# Patient Record
Sex: FEMALE | Race: WHITE | Employment: OTHER | URBAN - METROPOLITAN AREA
[De-identification: names, ages, dates, MRNs, and addresses within clinical notes are randomized per-mention and may not be internally consistent; named-entity substitution may affect disease eponyms.]

---

## 2018-03-27 RX ORDER — SULFASALAZINE 500 MG/1
500 TABLET ORAL 2 TIMES DAILY
COMMUNITY
End: 2019-05-08 | Stop reason: ALTCHOICE

## 2018-03-27 RX ORDER — OMEPRAZOLE 20 MG/1
20 CAPSULE, DELAYED RELEASE ORAL DAILY
COMMUNITY

## 2018-03-27 RX ORDER — SIMVASTATIN 40 MG
40 TABLET ORAL NIGHTLY
COMMUNITY

## 2018-03-28 ENCOUNTER — ANESTHESIA (OUTPATIENT)
Dept: OPERATING ROOM | Age: 58
End: 2018-03-28
Payer: COMMERCIAL

## 2018-03-28 ENCOUNTER — HOSPITAL ENCOUNTER (OUTPATIENT)
Dept: OPERATING ROOM | Age: 58
Discharge: HOME | End: 2018-03-28
Payer: COMMERCIAL

## 2018-03-28 ENCOUNTER — ANESTHESIA EVENT (OUTPATIENT)
Dept: OPERATING ROOM | Age: 58
End: 2018-03-28
Payer: COMMERCIAL

## 2018-03-28 ENCOUNTER — HOSPITAL ENCOUNTER (OUTPATIENT)
Age: 58
Setting detail: OUTPATIENT SURGERY
Discharge: HOME | End: 2018-03-28
Attending: ANESTHESIOLOGY | Admitting: ANESTHESIOLOGY
Payer: COMMERCIAL

## 2018-03-28 VITALS
SYSTOLIC BLOOD PRESSURE: 127 MMHG | OXYGEN SATURATION: 93 % | RESPIRATION RATE: 16 BRPM | DIASTOLIC BLOOD PRESSURE: 78 MMHG

## 2018-03-28 VITALS
HEART RATE: 76 BPM | TEMPERATURE: 98 F | DIASTOLIC BLOOD PRESSURE: 71 MMHG | BODY MASS INDEX: 45 KG/M2 | HEIGHT: 66 IN | OXYGEN SATURATION: 95 % | SYSTOLIC BLOOD PRESSURE: 114 MMHG | RESPIRATION RATE: 16 BRPM | WEIGHT: 280 LBS

## 2018-03-28 DIAGNOSIS — M51.36 DDD (DEGENERATIVE DISC DISEASE), LUMBAR: ICD-10-CM

## 2018-03-28 PROCEDURE — 2580000003 HC RX 258: Performed by: ANESTHESIOLOGY

## 2018-03-28 PROCEDURE — 6360000004 HC RX CONTRAST MEDICATION: Performed by: ANESTHESIOLOGY

## 2018-03-28 PROCEDURE — 3600000005 HC SURGERY LEVEL 5 BASE: Performed by: ANESTHESIOLOGY

## 2018-03-28 PROCEDURE — 2580000003 HC RX 258

## 2018-03-28 PROCEDURE — 2500000003 HC RX 250 WO HCPCS: Performed by: ANESTHESIOLOGY

## 2018-03-28 PROCEDURE — 6360000002 HC RX W HCPCS: Performed by: ANESTHESIOLOGY

## 2018-03-28 PROCEDURE — 82962 GLUCOSE BLOOD TEST: CPT | Performed by: ANESTHESIOLOGY

## 2018-03-28 PROCEDURE — 2500000003 HC RX 250 WO HCPCS: Performed by: NURSE ANESTHETIST, CERTIFIED REGISTERED

## 2018-03-28 PROCEDURE — 6360000002 HC RX W HCPCS: Performed by: NURSE ANESTHETIST, CERTIFIED REGISTERED

## 2018-03-28 PROCEDURE — 7100000010 HC PHASE II RECOVERY - FIRST 15 MIN: Performed by: ANESTHESIOLOGY

## 2018-03-28 PROCEDURE — 3700000000 HC ANESTHESIA ATTENDED CARE: Performed by: ANESTHESIOLOGY

## 2018-03-28 PROCEDURE — 7100000011 HC PHASE II RECOVERY - ADDTL 15 MIN: Performed by: ANESTHESIOLOGY

## 2018-03-28 PROCEDURE — 77003 FLUOROGUIDE FOR SPINE INJECT: CPT

## 2018-03-28 RX ORDER — DEXAMETHASONE SODIUM PHOSPHATE 10 MG/ML
INJECTION, SOLUTION INTRAMUSCULAR; INTRAVENOUS PRN
Status: DISCONTINUED | OUTPATIENT
Start: 2018-03-28 | End: 2018-03-28 | Stop reason: HOSPADM

## 2018-03-28 RX ORDER — OXYCODONE HYDROCHLORIDE AND ACETAMINOPHEN 5; 325 MG/1; MG/1
1 TABLET ORAL EVERY 4 HOURS PRN
Status: DISCONTINUED | OUTPATIENT
Start: 2018-03-28 | End: 2018-03-28 | Stop reason: HOSPADM

## 2018-03-28 RX ORDER — DIPHENHYDRAMINE HYDROCHLORIDE 50 MG/ML
12.5 INJECTION INTRAMUSCULAR; INTRAVENOUS
Status: DISCONTINUED | OUTPATIENT
Start: 2018-03-28 | End: 2018-03-28 | Stop reason: HOSPADM

## 2018-03-28 RX ORDER — 0.9 % SODIUM CHLORIDE 0.9 %
VIAL (ML) INJECTION PRN
Status: DISCONTINUED | OUTPATIENT
Start: 2018-03-28 | End: 2018-03-28 | Stop reason: HOSPADM

## 2018-03-28 RX ORDER — SODIUM CHLORIDE, SODIUM LACTATE, POTASSIUM CHLORIDE, CALCIUM CHLORIDE 600; 310; 30; 20 MG/100ML; MG/100ML; MG/100ML; MG/100ML
INJECTION, SOLUTION INTRAVENOUS CONTINUOUS
Status: DISCONTINUED | OUTPATIENT
Start: 2018-03-28 | End: 2018-03-28 | Stop reason: HOSPADM

## 2018-03-28 RX ORDER — HYDROMORPHONE HCL 110MG/55ML
0.25 PATIENT CONTROLLED ANALGESIA SYRINGE INTRAVENOUS EVERY 5 MIN PRN
Status: DISCONTINUED | OUTPATIENT
Start: 2018-03-28 | End: 2018-03-28 | Stop reason: HOSPADM

## 2018-03-28 RX ORDER — PROMETHAZINE HYDROCHLORIDE 25 MG/ML
25 INJECTION, SOLUTION INTRAMUSCULAR; INTRAVENOUS
Status: DISCONTINUED | OUTPATIENT
Start: 2018-03-28 | End: 2018-03-28 | Stop reason: HOSPADM

## 2018-03-28 RX ORDER — PROPOFOL 10 MG/ML
INJECTION, EMULSION INTRAVENOUS PRN
Status: DISCONTINUED | OUTPATIENT
Start: 2018-03-28 | End: 2018-03-28 | Stop reason: SDUPTHER

## 2018-03-28 RX ORDER — MEPERIDINE HYDROCHLORIDE 25 MG/ML
12.5 INJECTION INTRAMUSCULAR; INTRAVENOUS; SUBCUTANEOUS EVERY 5 MIN PRN
Status: DISCONTINUED | OUTPATIENT
Start: 2018-03-28 | End: 2018-03-28 | Stop reason: HOSPADM

## 2018-03-28 RX ORDER — KETOROLAC TROMETHAMINE 30 MG/ML
INJECTION, SOLUTION INTRAMUSCULAR; INTRAVENOUS PRN
Status: DISCONTINUED | OUTPATIENT
Start: 2018-03-28 | End: 2018-03-28 | Stop reason: SDUPTHER

## 2018-03-28 RX ORDER — LIDOCAINE HYDROCHLORIDE 20 MG/ML
INJECTION, SOLUTION INFILTRATION; PERINEURAL PRN
Status: DISCONTINUED | OUTPATIENT
Start: 2018-03-28 | End: 2018-03-28 | Stop reason: SDUPTHER

## 2018-03-28 RX ORDER — FENTANYL CITRATE 50 UG/ML
50 INJECTION, SOLUTION INTRAMUSCULAR; INTRAVENOUS EVERY 5 MIN PRN
Status: DISCONTINUED | OUTPATIENT
Start: 2018-03-28 | End: 2018-03-28 | Stop reason: HOSPADM

## 2018-03-28 RX ORDER — MORPHINE SULFATE 2 MG/ML
1 INJECTION, SOLUTION INTRAMUSCULAR; INTRAVENOUS EVERY 5 MIN PRN
Status: DISCONTINUED | OUTPATIENT
Start: 2018-03-28 | End: 2018-03-28 | Stop reason: HOSPADM

## 2018-03-28 RX ORDER — HYDRALAZINE HYDROCHLORIDE 20 MG/ML
5 INJECTION INTRAMUSCULAR; INTRAVENOUS EVERY 10 MIN PRN
Status: DISCONTINUED | OUTPATIENT
Start: 2018-03-28 | End: 2018-03-28 | Stop reason: HOSPADM

## 2018-03-28 RX ORDER — LIDOCAINE HYDROCHLORIDE 10 MG/ML
INJECTION, SOLUTION EPIDURAL; INFILTRATION; INTRACAUDAL; PERINEURAL PRN
Status: DISCONTINUED | OUTPATIENT
Start: 2018-03-28 | End: 2018-03-28 | Stop reason: HOSPADM

## 2018-03-28 RX ORDER — LABETALOL HYDROCHLORIDE 5 MG/ML
5 INJECTION, SOLUTION INTRAVENOUS EVERY 10 MIN PRN
Status: DISCONTINUED | OUTPATIENT
Start: 2018-03-28 | End: 2018-03-28 | Stop reason: HOSPADM

## 2018-03-28 RX ORDER — MIDAZOLAM HYDROCHLORIDE 1 MG/ML
INJECTION INTRAMUSCULAR; INTRAVENOUS PRN
Status: DISCONTINUED | OUTPATIENT
Start: 2018-03-28 | End: 2018-03-28 | Stop reason: SDUPTHER

## 2018-03-28 RX ADMIN — MIDAZOLAM HYDROCHLORIDE 2 MG: 1 INJECTION INTRAMUSCULAR; INTRAVENOUS at 12:10

## 2018-03-28 RX ADMIN — ALFENTANIL HYDROCHLORIDE 250 MCG: 500 INJECTION INTRAVENOUS at 12:10

## 2018-03-28 RX ADMIN — ALFENTANIL HYDROCHLORIDE 250 MCG: 500 INJECTION INTRAVENOUS at 12:12

## 2018-03-28 RX ADMIN — SODIUM CHLORIDE, SODIUM LACTATE, POTASSIUM CHLORIDE, CALCIUM CHLORIDE: 600; 310; 30; 20 INJECTION, SOLUTION INTRAVENOUS at 11:25

## 2018-03-28 RX ADMIN — PROPOFOL 20 MG: 10 INJECTION, EMULSION INTRAVENOUS at 12:12

## 2018-03-28 RX ADMIN — LIDOCAINE HYDROCHLORIDE 25 MG: 20 INJECTION, SOLUTION INFILTRATION; PERINEURAL at 12:10

## 2018-03-28 RX ADMIN — PROPOFOL 20 MG: 10 INJECTION, EMULSION INTRAVENOUS at 12:10

## 2018-03-28 RX ADMIN — KETOROLAC TROMETHAMINE 30 MG: 30 INJECTION, SOLUTION INTRAMUSCULAR at 12:17

## 2018-03-28 ASSESSMENT — PULMONARY FUNCTION TESTS
PIF_VALUE: 0

## 2018-03-28 ASSESSMENT — PAIN DESCRIPTION - DESCRIPTORS: DESCRIPTORS: ACHING

## 2018-03-28 ASSESSMENT — PAIN SCALES - GENERAL
PAINLEVEL_OUTOF10: 0

## 2018-03-28 ASSESSMENT — LIFESTYLE VARIABLES: SMOKING_STATUS: 0

## 2018-03-28 ASSESSMENT — PAIN - FUNCTIONAL ASSESSMENT: PAIN_FUNCTIONAL_ASSESSMENT: 0-10

## 2018-03-28 NOTE — ANESTHESIA PRE PROCEDURE
MG delayed release capsule Take 40 mg by mouth daily      simvastatin (ZOCOR) 40 MG tablet Take 40 mg by mouth nightly      sulfaSALAzine (AZULFIDINE) 500 MG tablet Take 500 mg by mouth 2 times daily      ondansetron (ZOFRAN) 4 MG tablet Take 1 tablet by mouth daily as needed for Nausea or Vomiting 20 tablet 0    Albuterol Sulfate (PROAIR HFA IN) Inhale into the lungs as needed      hydroxychloroquine (PLAQUENIL) 200 MG tablet Take by mouth 2 times daily       ezetimibe (ZETIA) 10 MG tablet Take 10 mg by mouth nightly       gemfibrozil (LOPID) 600 MG tablet Take 600 mg by mouth 2 times daily (before meals). Allergies:     Allergies   Allergen Reactions    Latex Rash     sensitive       Problem List:    Patient Active Problem List   Diagnosis Code    Lumbar post-laminectomy syndrome M96.1    Neural foraminal stenosis of lumbar spine M99.83    Lumbar radiculopathy left greater than right M54.16    Diabetes mellitus (HCC) E11.9    Peripheral neuropathy ([address] G62.9    Protruded lumbar disc M51.26    DDD (degenerative disc disease), lumbar M51.36    Facet syndrome, lumbar M12.88    Trigger ring finger of right hand M65.341    Trigger middle finger of left hand M65.332    Sacroiliitis (HCC) bilateral M46.1    Epigastric pain R10.13    Acute meniscal tear of knee S83.209A    Primary osteoarthritis of left knee M17.12       Past Medical History:        Diagnosis Date    Arthritis     Asthma     Diabetes mellitus ([address]     GERD (gastroesophageal reflux disease)     History of blood transfusion     after daughter 28 yrs ago     Hyperlipidemia     Kidney stones     Neuropathy ([address]     Pleurisy     Pneumonia     Sleep apnea     cpap       Past Surgical History:        Procedure Laterality Date    APPENDECTOMY      BACK SURGERY  08/01/2012    L3,4,5    BREAST SURGERY      right lumpectomy benign    CHOLECYSTECTOMY, LAPAROSCOPIC      DILATATION, ESOPHAGUS      FINGER TRIGGER RELEASE Bilateral 10/02/14    HYSTERECTOMY      KNEE ARTHROSCOPY Left 09/20/2016    medial menisectomy, synovectomy, chondroplasty    NERVE BLOCK Left 2/19/2014    left lumbar transforaminal nerve block under x-ray with sedation L3-4, L4-5  #1    NERVE BLOCK Left 2/26/14    tranfsoramihnla #2 w sedation    NERVE BLOCK  04/30/14    internal lumbar facet block #1 with sedation L3-4, L4-5, L5-S1    NERVE BLOCK  05/07/14    bilateral facet block L3-4, L4-5, L5-S1 with IV sedation #2    NERVE BLOCK Bilateral 5 14 14    lumb facet with sedation #3    NERVE BLOCK Bilateral 2 18 15    si inj #1 with anesthesia    NERVE BLOCK Bilateral 2 25 15    si inj #2 with iv sedation    OTHER SURGICAL HISTORY Left 3/5/2014    left lumbar transforaminal nerve block  under x-ray with sedation  #3 L3-4, L4-5    OTHER SURGICAL HISTORY Left 6/18/2014    left lumbar radiofrequency L3-4, L4-5, L5-S!    OTHER SURGICAL HISTORY Right 7/28/14    L3-S1 radiofrequency    STAPEDES SURGERY      right by Dr. Caesar Kessler         Social History:    Social History   Substance Use Topics    Smoking status: Former Smoker     Years: 10.00     Types: Cigarettes     Quit date: 7/5/2015    Smokeless tobacco: Never Used    Alcohol use No                                Counseling given: Not Answered      Vital Signs (Current):   Vitals:    03/27/18 1304   Weight: 280 lb (127 kg)   Height: 5' 6\" (1.676 m)                                              BP Readings from Last 3 Encounters:   09/20/16 139/74   08/26/16 126/80   03/08/16 100/60       NPO Status:  >8.H                                                                               BMI:   Wt Readings from Last 3 Encounters:   12/29/16 270 lb 1 oz (122.5 kg)   11/16/16 270 lb 1 oz (122.5 kg)   09/20/16 270 lb (122.5 kg)     Body mass index is 45.19 kg/m².     CBC:   Lab Results   Component Value Date    WBC 11.1 03/08/2016    RBC 4.93 03/08/2016    HGB 12.2 03/08/2016

## 2018-04-17 ENCOUNTER — ANESTHESIA EVENT (OUTPATIENT)
Dept: OPERATING ROOM | Age: 58
End: 2018-04-17
Payer: COMMERCIAL

## 2018-04-18 ENCOUNTER — HOSPITAL ENCOUNTER (OUTPATIENT)
Dept: OPERATING ROOM | Age: 58
Discharge: HOME | End: 2018-04-18
Payer: COMMERCIAL

## 2018-04-18 ENCOUNTER — HOSPITAL ENCOUNTER (OUTPATIENT)
Age: 58
Setting detail: OUTPATIENT SURGERY
Discharge: HOME | End: 2018-04-18
Attending: ANESTHESIOLOGY | Admitting: ANESTHESIOLOGY
Payer: COMMERCIAL

## 2018-04-18 ENCOUNTER — ANESTHESIA (OUTPATIENT)
Dept: OPERATING ROOM | Age: 58
End: 2018-04-18
Payer: COMMERCIAL

## 2018-04-18 VITALS
SYSTOLIC BLOOD PRESSURE: 146 MMHG | DIASTOLIC BLOOD PRESSURE: 112 MMHG | RESPIRATION RATE: 21 BRPM | OXYGEN SATURATION: 98 %

## 2018-04-18 VITALS
HEIGHT: 66 IN | HEART RATE: 66 BPM | TEMPERATURE: 98 F | WEIGHT: 282 LBS | DIASTOLIC BLOOD PRESSURE: 68 MMHG | SYSTOLIC BLOOD PRESSURE: 115 MMHG | BODY MASS INDEX: 45.32 KG/M2 | RESPIRATION RATE: 16 BRPM | OXYGEN SATURATION: 95 %

## 2018-04-18 DIAGNOSIS — M51.9 LUMBAR DISC DISEASE: ICD-10-CM

## 2018-04-18 PROCEDURE — 3700000000 HC ANESTHESIA ATTENDED CARE: Performed by: ANESTHESIOLOGY

## 2018-04-18 PROCEDURE — 3600000005 HC SURGERY LEVEL 5 BASE: Performed by: ANESTHESIOLOGY

## 2018-04-18 PROCEDURE — 6360000002 HC RX W HCPCS: Performed by: NURSE ANESTHETIST, CERTIFIED REGISTERED

## 2018-04-18 PROCEDURE — 3209999900 FLUORO FOR SURGICAL PROCEDURES

## 2018-04-18 PROCEDURE — 7100000011 HC PHASE II RECOVERY - ADDTL 15 MIN: Performed by: ANESTHESIOLOGY

## 2018-04-18 PROCEDURE — 2580000003 HC RX 258: Performed by: ANESTHESIOLOGY

## 2018-04-18 PROCEDURE — 7100000010 HC PHASE II RECOVERY - FIRST 15 MIN: Performed by: ANESTHESIOLOGY

## 2018-04-18 PROCEDURE — 6360000004 HC RX CONTRAST MEDICATION: Performed by: ANESTHESIOLOGY

## 2018-04-18 PROCEDURE — 6360000002 HC RX W HCPCS: Performed by: ANESTHESIOLOGY

## 2018-04-18 PROCEDURE — 2500000003 HC RX 250 WO HCPCS: Performed by: ANESTHESIOLOGY

## 2018-04-18 RX ORDER — LIDOCAINE HYDROCHLORIDE 10 MG/ML
INJECTION, SOLUTION EPIDURAL; INFILTRATION; INTRACAUDAL; PERINEURAL PRN
Status: DISCONTINUED | OUTPATIENT
Start: 2018-04-18 | End: 2018-04-25 | Stop reason: HOSPADM

## 2018-04-18 RX ORDER — PROPOFOL 10 MG/ML
INJECTION, EMULSION INTRAVENOUS PRN
Status: DISCONTINUED | OUTPATIENT
Start: 2018-04-18 | End: 2018-04-18 | Stop reason: SDUPTHER

## 2018-04-18 RX ORDER — HYDRALAZINE HYDROCHLORIDE 20 MG/ML
5 INJECTION INTRAMUSCULAR; INTRAVENOUS EVERY 10 MIN PRN
Status: DISCONTINUED | OUTPATIENT
Start: 2018-04-18 | End: 2018-04-25 | Stop reason: HOSPADM

## 2018-04-18 RX ORDER — FENTANYL CITRATE 50 UG/ML
INJECTION, SOLUTION INTRAMUSCULAR; INTRAVENOUS PRN
Status: DISCONTINUED | OUTPATIENT
Start: 2018-04-18 | End: 2018-04-18 | Stop reason: SDUPTHER

## 2018-04-18 RX ORDER — MORPHINE SULFATE 2 MG/ML
1 INJECTION, SOLUTION INTRAMUSCULAR; INTRAVENOUS EVERY 5 MIN PRN
Status: DISCONTINUED | OUTPATIENT
Start: 2018-04-18 | End: 2018-04-25 | Stop reason: HOSPADM

## 2018-04-18 RX ORDER — MIDAZOLAM HYDROCHLORIDE 1 MG/ML
INJECTION INTRAMUSCULAR; INTRAVENOUS PRN
Status: DISCONTINUED | OUTPATIENT
Start: 2018-04-18 | End: 2018-04-18 | Stop reason: SDUPTHER

## 2018-04-18 RX ORDER — HYDROCODONE BITARTRATE AND ACETAMINOPHEN 5; 325 MG/1; MG/1
2 TABLET ORAL PRN
Status: DISCONTINUED | OUTPATIENT
Start: 2018-04-18 | End: 2018-04-25 | Stop reason: HOSPADM

## 2018-04-18 RX ORDER — HYDROCODONE BITARTRATE AND ACETAMINOPHEN 5; 325 MG/1; MG/1
1 TABLET ORAL PRN
Status: DISCONTINUED | OUTPATIENT
Start: 2018-04-18 | End: 2018-04-25 | Stop reason: HOSPADM

## 2018-04-18 RX ORDER — PROMETHAZINE HYDROCHLORIDE 25 MG/ML
25 INJECTION, SOLUTION INTRAMUSCULAR; INTRAVENOUS
Status: DISCONTINUED | OUTPATIENT
Start: 2018-04-18 | End: 2018-04-25 | Stop reason: HOSPADM

## 2018-04-18 RX ORDER — FENTANYL CITRATE 50 UG/ML
50 INJECTION, SOLUTION INTRAMUSCULAR; INTRAVENOUS EVERY 5 MIN PRN
Status: DISCONTINUED | OUTPATIENT
Start: 2018-04-18 | End: 2018-04-23 | Stop reason: ALTCHOICE

## 2018-04-18 RX ORDER — SODIUM CHLORIDE, SODIUM LACTATE, POTASSIUM CHLORIDE, CALCIUM CHLORIDE 600; 310; 30; 20 MG/100ML; MG/100ML; MG/100ML; MG/100ML
INJECTION, SOLUTION INTRAVENOUS CONTINUOUS
Status: DISCONTINUED | OUTPATIENT
Start: 2018-04-18 | End: 2018-04-23 | Stop reason: ALTCHOICE

## 2018-04-18 RX ORDER — MEPERIDINE HYDROCHLORIDE 25 MG/ML
12.5 INJECTION INTRAMUSCULAR; INTRAVENOUS; SUBCUTANEOUS EVERY 5 MIN PRN
Status: DISCONTINUED | OUTPATIENT
Start: 2018-04-18 | End: 2018-04-23 | Stop reason: ALTCHOICE

## 2018-04-18 RX ORDER — FENTANYL CITRATE 50 UG/ML
50 INJECTION, SOLUTION INTRAMUSCULAR; INTRAVENOUS EVERY 5 MIN PRN
Status: DISCONTINUED | OUTPATIENT
Start: 2018-04-18 | End: 2018-04-25 | Stop reason: HOSPADM

## 2018-04-18 RX ORDER — DIPHENHYDRAMINE HYDROCHLORIDE 50 MG/ML
12.5 INJECTION INTRAMUSCULAR; INTRAVENOUS
Status: DISCONTINUED | OUTPATIENT
Start: 2018-04-18 | End: 2018-04-25 | Stop reason: HOSPADM

## 2018-04-18 RX ORDER — LABETALOL HYDROCHLORIDE 5 MG/ML
5 INJECTION, SOLUTION INTRAVENOUS EVERY 10 MIN PRN
Status: DISCONTINUED | OUTPATIENT
Start: 2018-04-18 | End: 2018-04-25 | Stop reason: HOSPADM

## 2018-04-18 RX ADMIN — SODIUM CHLORIDE, POTASSIUM CHLORIDE, SODIUM LACTATE AND CALCIUM CHLORIDE: 600; 310; 30; 20 INJECTION, SOLUTION INTRAVENOUS at 08:20

## 2018-04-18 RX ADMIN — MIDAZOLAM HYDROCHLORIDE 2 MG: 1 INJECTION INTRAMUSCULAR; INTRAVENOUS at 08:45

## 2018-04-18 RX ADMIN — FENTANYL CITRATE 100 MCG: 50 INJECTION, SOLUTION INTRAMUSCULAR; INTRAVENOUS at 09:50

## 2018-04-18 RX ADMIN — PROPOFOL 80 MG: 10 INJECTION, EMULSION INTRAVENOUS at 09:51

## 2018-04-18 RX ADMIN — MIDAZOLAM HYDROCHLORIDE 2 MG: 1 INJECTION INTRAMUSCULAR; INTRAVENOUS at 09:50

## 2018-04-18 RX ADMIN — FENTANYL CITRATE 50 MCG: 50 INJECTION, SOLUTION INTRAMUSCULAR; INTRAVENOUS at 08:45

## 2018-04-18 ASSESSMENT — PAIN SCALES - GENERAL
PAINLEVEL_OUTOF10: 0

## 2018-04-18 ASSESSMENT — PAIN DESCRIPTION - DESCRIPTORS: DESCRIPTORS: ACHING;DISCOMFORT

## 2018-04-18 ASSESSMENT — PULMONARY FUNCTION TESTS
PIF_VALUE: 0

## 2018-04-18 ASSESSMENT — LIFESTYLE VARIABLES: SMOKING_STATUS: 0

## 2018-04-18 ASSESSMENT — PAIN - FUNCTIONAL ASSESSMENT: PAIN_FUNCTIONAL_ASSESSMENT: 0-10

## 2018-04-24 ENCOUNTER — ANESTHESIA EVENT (OUTPATIENT)
Dept: OPERATING ROOM | Age: 58
End: 2018-04-24
Payer: COMMERCIAL

## 2018-04-24 ASSESSMENT — LIFESTYLE VARIABLES: SMOKING_STATUS: 0

## 2018-04-25 ENCOUNTER — ANESTHESIA (OUTPATIENT)
Dept: OPERATING ROOM | Age: 58
End: 2018-04-25
Payer: COMMERCIAL

## 2018-04-25 ENCOUNTER — HOSPITAL ENCOUNTER (OUTPATIENT)
Age: 58
Setting detail: OUTPATIENT SURGERY
Discharge: HOME | End: 2018-04-25
Attending: ANESTHESIOLOGY | Admitting: ANESTHESIOLOGY
Payer: COMMERCIAL

## 2018-04-25 VITALS
TEMPERATURE: 97 F | HEIGHT: 66 IN | SYSTOLIC BLOOD PRESSURE: 121 MMHG | DIASTOLIC BLOOD PRESSURE: 74 MMHG | BODY MASS INDEX: 45.48 KG/M2 | RESPIRATION RATE: 16 BRPM | HEART RATE: 79 BPM | WEIGHT: 283 LBS | OXYGEN SATURATION: 93 %

## 2018-04-25 VITALS
DIASTOLIC BLOOD PRESSURE: 81 MMHG | TEMPERATURE: 97.5 F | SYSTOLIC BLOOD PRESSURE: 138 MMHG | OXYGEN SATURATION: 90 % | RESPIRATION RATE: 16 BRPM

## 2018-04-25 LAB — GLUCOSE BLD-MCNC: 137 MG/DL

## 2018-04-25 PROCEDURE — 2580000003 HC RX 258: Performed by: ANESTHESIOLOGY

## 2018-04-25 PROCEDURE — 2500000003 HC RX 250 WO HCPCS: Performed by: ANESTHESIOLOGY

## 2018-04-25 PROCEDURE — 3600000005 HC SURGERY LEVEL 5 BASE: Performed by: ANESTHESIOLOGY

## 2018-04-25 PROCEDURE — 7100000010 HC PHASE II RECOVERY - FIRST 15 MIN: Performed by: ANESTHESIOLOGY

## 2018-04-25 PROCEDURE — 3700000001 HC ADD 15 MINUTES (ANESTHESIA): Performed by: ANESTHESIOLOGY

## 2018-04-25 PROCEDURE — 7100000011 HC PHASE II RECOVERY - ADDTL 15 MIN: Performed by: ANESTHESIOLOGY

## 2018-04-25 PROCEDURE — 6360000002 HC RX W HCPCS: Performed by: NURSE ANESTHETIST, CERTIFIED REGISTERED

## 2018-04-25 PROCEDURE — 2500000003 HC RX 250 WO HCPCS: Performed by: NURSE ANESTHETIST, CERTIFIED REGISTERED

## 2018-04-25 PROCEDURE — 3600000015 HC SURGERY LEVEL 5 ADDTL 15MIN: Performed by: ANESTHESIOLOGY

## 2018-04-25 PROCEDURE — 82962 GLUCOSE BLOOD TEST: CPT | Performed by: ANESTHESIOLOGY

## 2018-04-25 PROCEDURE — 6360000004 HC RX CONTRAST MEDICATION: Performed by: ANESTHESIOLOGY

## 2018-04-25 PROCEDURE — 6360000002 HC RX W HCPCS: Performed by: ANESTHESIOLOGY

## 2018-04-25 PROCEDURE — 3700000000 HC ANESTHESIA ATTENDED CARE: Performed by: ANESTHESIOLOGY

## 2018-04-25 RX ORDER — LIDOCAINE HYDROCHLORIDE 10 MG/ML
INJECTION, SOLUTION EPIDURAL; INFILTRATION; INTRACAUDAL; PERINEURAL PRN
Status: DISCONTINUED | OUTPATIENT
Start: 2018-04-25 | End: 2018-04-25 | Stop reason: HOSPADM

## 2018-04-25 RX ORDER — ALFENTANIL HYDROCHLORIDE 500 UG/ML
INJECTION INTRAVENOUS PRN
Status: DISCONTINUED | OUTPATIENT
Start: 2018-04-25 | End: 2018-04-25 | Stop reason: SDUPTHER

## 2018-04-25 RX ORDER — SODIUM CHLORIDE, SODIUM LACTATE, POTASSIUM CHLORIDE, CALCIUM CHLORIDE 600; 310; 30; 20 MG/100ML; MG/100ML; MG/100ML; MG/100ML
INJECTION, SOLUTION INTRAVENOUS CONTINUOUS
Status: DISCONTINUED | OUTPATIENT
Start: 2018-04-25 | End: 2018-04-25 | Stop reason: HOSPADM

## 2018-04-25 RX ORDER — MIDAZOLAM HYDROCHLORIDE 1 MG/ML
INJECTION INTRAMUSCULAR; INTRAVENOUS PRN
Status: DISCONTINUED | OUTPATIENT
Start: 2018-04-25 | End: 2018-04-25 | Stop reason: SDUPTHER

## 2018-04-25 RX ORDER — PROPOFOL 10 MG/ML
INJECTION, EMULSION INTRAVENOUS PRN
Status: DISCONTINUED | OUTPATIENT
Start: 2018-04-25 | End: 2018-04-25 | Stop reason: SDUPTHER

## 2018-04-25 RX ORDER — LIDOCAINE HYDROCHLORIDE 20 MG/ML
INJECTION, SOLUTION INFILTRATION; PERINEURAL PRN
Status: DISCONTINUED | OUTPATIENT
Start: 2018-04-25 | End: 2018-04-25 | Stop reason: SDUPTHER

## 2018-04-25 RX ADMIN — ALFENTANIL HYDROCHLORIDE 250 MCG: 500 INJECTION INTRAVENOUS at 10:20

## 2018-04-25 RX ADMIN — MIDAZOLAM HYDROCHLORIDE 2 MG: 1 INJECTION INTRAMUSCULAR; INTRAVENOUS at 10:15

## 2018-04-25 RX ADMIN — ALFENTANIL HYDROCHLORIDE 250 MCG: 500 INJECTION INTRAVENOUS at 10:16

## 2018-04-25 RX ADMIN — PROPOFOL 40 MG: 10 INJECTION, EMULSION INTRAVENOUS at 10:22

## 2018-04-25 RX ADMIN — LIDOCAINE HYDROCHLORIDE 10 MG: 20 INJECTION, SOLUTION INFILTRATION; PERINEURAL at 10:22

## 2018-04-25 RX ADMIN — SODIUM CHLORIDE, POTASSIUM CHLORIDE, SODIUM LACTATE AND CALCIUM CHLORIDE: 600; 310; 30; 20 INJECTION, SOLUTION INTRAVENOUS at 10:00

## 2018-04-25 RX ADMIN — ALFENTANIL HYDROCHLORIDE 250 MCG: 500 INJECTION INTRAVENOUS at 10:17

## 2018-04-25 RX ADMIN — ALFENTANIL HYDROCHLORIDE 250 MCG: 500 INJECTION INTRAVENOUS at 10:19

## 2018-04-25 ASSESSMENT — PULMONARY FUNCTION TESTS
PIF_VALUE: 0

## 2018-04-25 ASSESSMENT — PAIN SCALES - GENERAL
PAINLEVEL_OUTOF10: 0

## 2018-04-25 ASSESSMENT — PAIN - FUNCTIONAL ASSESSMENT: PAIN_FUNCTIONAL_ASSESSMENT: 0-10

## 2018-04-25 ASSESSMENT — PAIN DESCRIPTION - DESCRIPTORS: DESCRIPTORS: DISCOMFORT

## 2018-05-01 ENCOUNTER — ANESTHESIA EVENT (OUTPATIENT)
Dept: OPERATING ROOM | Age: 58
End: 2018-05-01
Payer: COMMERCIAL

## 2018-05-01 ASSESSMENT — LIFESTYLE VARIABLES: SMOKING_STATUS: 0

## 2018-05-02 ENCOUNTER — HOSPITAL ENCOUNTER (OUTPATIENT)
Age: 58
Setting detail: OUTPATIENT SURGERY
Discharge: HOME | End: 2018-05-02
Attending: ANESTHESIOLOGY | Admitting: ANESTHESIOLOGY
Payer: COMMERCIAL

## 2018-05-02 ENCOUNTER — HOSPITAL ENCOUNTER (OUTPATIENT)
Dept: OPERATING ROOM | Age: 58
Discharge: HOME | End: 2018-05-02
Payer: COMMERCIAL

## 2018-05-02 ENCOUNTER — ANESTHESIA (OUTPATIENT)
Dept: OPERATING ROOM | Age: 58
End: 2018-05-02
Payer: COMMERCIAL

## 2018-05-02 VITALS
HEART RATE: 82 BPM | RESPIRATION RATE: 16 BRPM | OXYGEN SATURATION: 95 % | HEIGHT: 65 IN | WEIGHT: 284 LBS | SYSTOLIC BLOOD PRESSURE: 119 MMHG | DIASTOLIC BLOOD PRESSURE: 66 MMHG | BODY MASS INDEX: 47.32 KG/M2

## 2018-05-02 VITALS
OXYGEN SATURATION: 93 % | DIASTOLIC BLOOD PRESSURE: 83 MMHG | SYSTOLIC BLOOD PRESSURE: 123 MMHG | RESPIRATION RATE: 15 BRPM

## 2018-05-02 DIAGNOSIS — M51.36 DDD (DEGENERATIVE DISC DISEASE), LUMBAR: ICD-10-CM

## 2018-05-02 LAB — GLUCOSE BLD-MCNC: 168 MG/DL

## 2018-05-02 PROCEDURE — 2580000003 HC RX 258: Performed by: ANESTHESIOLOGY

## 2018-05-02 PROCEDURE — 3209999900 FLUORO FOR SURGICAL PROCEDURES

## 2018-05-02 PROCEDURE — 2500000003 HC RX 250 WO HCPCS: Performed by: NURSE ANESTHETIST, CERTIFIED REGISTERED

## 2018-05-02 PROCEDURE — 82962 GLUCOSE BLOOD TEST: CPT | Performed by: ANESTHESIOLOGY

## 2018-05-02 PROCEDURE — 7100000010 HC PHASE II RECOVERY - FIRST 15 MIN: Performed by: ANESTHESIOLOGY

## 2018-05-02 PROCEDURE — 3600000005 HC SURGERY LEVEL 5 BASE: Performed by: ANESTHESIOLOGY

## 2018-05-02 PROCEDURE — 6360000002 HC RX W HCPCS: Performed by: NURSE ANESTHETIST, CERTIFIED REGISTERED

## 2018-05-02 PROCEDURE — 6360000004 HC RX CONTRAST MEDICATION: Performed by: ANESTHESIOLOGY

## 2018-05-02 PROCEDURE — 3700000000 HC ANESTHESIA ATTENDED CARE: Performed by: ANESTHESIOLOGY

## 2018-05-02 PROCEDURE — 2500000003 HC RX 250 WO HCPCS: Performed by: ANESTHESIOLOGY

## 2018-05-02 PROCEDURE — 6360000002 HC RX W HCPCS: Performed by: ANESTHESIOLOGY

## 2018-05-02 PROCEDURE — 7100000011 HC PHASE II RECOVERY - ADDTL 15 MIN: Performed by: ANESTHESIOLOGY

## 2018-05-02 RX ORDER — HYDRALAZINE HYDROCHLORIDE 20 MG/ML
5 INJECTION INTRAMUSCULAR; INTRAVENOUS EVERY 10 MIN PRN
Status: DISCONTINUED | OUTPATIENT
Start: 2018-05-02 | End: 2018-05-02 | Stop reason: HOSPADM

## 2018-05-02 RX ORDER — SODIUM CHLORIDE, SODIUM LACTATE, POTASSIUM CHLORIDE, CALCIUM CHLORIDE 600; 310; 30; 20 MG/100ML; MG/100ML; MG/100ML; MG/100ML
INJECTION, SOLUTION INTRAVENOUS CONTINUOUS
Status: DISCONTINUED | OUTPATIENT
Start: 2018-05-02 | End: 2018-05-02 | Stop reason: HOSPADM

## 2018-05-02 RX ORDER — LIDOCAINE HYDROCHLORIDE 10 MG/ML
INJECTION, SOLUTION EPIDURAL; INFILTRATION; INTRACAUDAL; PERINEURAL PRN
Status: DISCONTINUED | OUTPATIENT
Start: 2018-05-02 | End: 2018-05-02 | Stop reason: HOSPADM

## 2018-05-02 RX ORDER — FENTANYL CITRATE 50 UG/ML
50 INJECTION, SOLUTION INTRAMUSCULAR; INTRAVENOUS EVERY 5 MIN PRN
Status: DISCONTINUED | OUTPATIENT
Start: 2018-05-02 | End: 2018-05-02 | Stop reason: HOSPADM

## 2018-05-02 RX ORDER — OXYCODONE HYDROCHLORIDE AND ACETAMINOPHEN 5; 325 MG/1; MG/1
1 TABLET ORAL EVERY 4 HOURS PRN
Status: DISCONTINUED | OUTPATIENT
Start: 2018-05-02 | End: 2018-05-02 | Stop reason: HOSPADM

## 2018-05-02 RX ORDER — MORPHINE SULFATE 2 MG/ML
1 INJECTION, SOLUTION INTRAMUSCULAR; INTRAVENOUS EVERY 5 MIN PRN
Status: DISCONTINUED | OUTPATIENT
Start: 2018-05-02 | End: 2018-05-02 | Stop reason: HOSPADM

## 2018-05-02 RX ORDER — PROMETHAZINE HYDROCHLORIDE 25 MG/ML
25 INJECTION, SOLUTION INTRAMUSCULAR; INTRAVENOUS
Status: DISCONTINUED | OUTPATIENT
Start: 2018-05-02 | End: 2018-05-02 | Stop reason: HOSPADM

## 2018-05-02 RX ORDER — MIDAZOLAM HYDROCHLORIDE 1 MG/ML
INJECTION INTRAMUSCULAR; INTRAVENOUS PRN
Status: DISCONTINUED | OUTPATIENT
Start: 2018-05-02 | End: 2018-05-02 | Stop reason: SDUPTHER

## 2018-05-02 RX ORDER — ALFENTANIL HYDROCHLORIDE 500 UG/ML
INJECTION INTRAVENOUS PRN
Status: DISCONTINUED | OUTPATIENT
Start: 2018-05-02 | End: 2018-05-02 | Stop reason: SDUPTHER

## 2018-05-02 RX ORDER — DIPHENHYDRAMINE HYDROCHLORIDE 50 MG/ML
12.5 INJECTION INTRAMUSCULAR; INTRAVENOUS
Status: DISCONTINUED | OUTPATIENT
Start: 2018-05-02 | End: 2018-05-02 | Stop reason: HOSPADM

## 2018-05-02 RX ORDER — LIDOCAINE HYDROCHLORIDE 20 MG/ML
INJECTION, SOLUTION EPIDURAL; INFILTRATION; INTRACAUDAL; PERINEURAL PRN
Status: DISCONTINUED | OUTPATIENT
Start: 2018-05-02 | End: 2018-05-02 | Stop reason: SDUPTHER

## 2018-05-02 RX ORDER — MEPERIDINE HYDROCHLORIDE 25 MG/ML
12.5 INJECTION INTRAMUSCULAR; INTRAVENOUS; SUBCUTANEOUS EVERY 5 MIN PRN
Status: DISCONTINUED | OUTPATIENT
Start: 2018-05-02 | End: 2018-05-02 | Stop reason: HOSPADM

## 2018-05-02 RX ORDER — PROPOFOL 10 MG/ML
INJECTION, EMULSION INTRAVENOUS PRN
Status: DISCONTINUED | OUTPATIENT
Start: 2018-05-02 | End: 2018-05-02 | Stop reason: SDUPTHER

## 2018-05-02 RX ORDER — LABETALOL HYDROCHLORIDE 5 MG/ML
5 INJECTION, SOLUTION INTRAVENOUS EVERY 10 MIN PRN
Status: DISCONTINUED | OUTPATIENT
Start: 2018-05-02 | End: 2018-05-02 | Stop reason: HOSPADM

## 2018-05-02 RX ADMIN — ALFENTANIL HYDROCHLORIDE 250 MCG: 500 INJECTION INTRAVENOUS at 10:47

## 2018-05-02 RX ADMIN — SODIUM CHLORIDE, POTASSIUM CHLORIDE, SODIUM LACTATE AND CALCIUM CHLORIDE: 600; 310; 30; 20 INJECTION, SOLUTION INTRAVENOUS at 09:23

## 2018-05-02 RX ADMIN — PROPOFOL 20 MG: 10 INJECTION, EMULSION INTRAVENOUS at 10:48

## 2018-05-02 RX ADMIN — MIDAZOLAM HYDROCHLORIDE 2 MG: 1 INJECTION INTRAMUSCULAR; INTRAVENOUS at 10:47

## 2018-05-02 RX ADMIN — ALFENTANIL HYDROCHLORIDE 250 MCG: 500 INJECTION INTRAVENOUS at 10:48

## 2018-05-02 RX ADMIN — LIDOCAINE HYDROCHLORIDE 20 MG: 20 INJECTION, SOLUTION EPIDURAL; INFILTRATION; INTRACAUDAL; PERINEURAL at 10:47

## 2018-05-02 RX ADMIN — PROPOFOL 30 MG: 10 INJECTION, EMULSION INTRAVENOUS at 10:49

## 2018-05-02 ASSESSMENT — PULMONARY FUNCTION TESTS
PIF_VALUE: 0

## 2018-05-02 ASSESSMENT — PAIN - FUNCTIONAL ASSESSMENT: PAIN_FUNCTIONAL_ASSESSMENT: 0-10

## 2018-05-02 ASSESSMENT — PAIN DESCRIPTION - DESCRIPTORS: DESCRIPTORS: ACHING

## 2018-05-02 ASSESSMENT — PAIN SCALES - GENERAL
PAINLEVEL_OUTOF10: 0

## 2018-08-20 ENCOUNTER — HOSPITAL ENCOUNTER (OUTPATIENT)
Age: 58
Discharge: HOME | End: 2018-08-22
Payer: COMMERCIAL

## 2018-08-20 LAB
ALBUMIN SERPL-MCNC: 4.8 G/DL (ref 3.5–5.2)
ALP BLD-CCNC: 168 U/L (ref 35–104)
ALT SERPL-CCNC: 52 U/L (ref 0–32)
ANION GAP SERPL CALCULATED.3IONS-SCNC: 23 MMOL/L (ref 7–16)
AST SERPL-CCNC: 42 U/L (ref 0–31)
BILIRUB SERPL-MCNC: 0.4 MG/DL (ref 0–1.2)
BUN BLDV-MCNC: 14 MG/DL (ref 6–20)
CALCIUM SERPL-MCNC: 10.1 MG/DL (ref 8.6–10.2)
CHLORIDE BLD-SCNC: 98 MMOL/L (ref 98–107)
CO2: 20 MMOL/L (ref 22–29)
CREAT SERPL-MCNC: 0.6 MG/DL (ref 0.5–1)
GFR AFRICAN AMERICAN: >60
GFR NON-AFRICAN AMERICAN: >60 ML/MIN/1.73
GLUCOSE BLD-MCNC: 381 MG/DL (ref 74–109)
HBA1C MFR BLD: 11.5 % (ref 4–5.6)
POTASSIUM SERPL-SCNC: 4.4 MMOL/L (ref 3.5–5)
SODIUM BLD-SCNC: 141 MMOL/L (ref 132–146)
TOTAL PROTEIN: 7.8 G/DL (ref 6.4–8.3)

## 2018-08-20 PROCEDURE — 83036 HEMOGLOBIN GLYCOSYLATED A1C: CPT

## 2018-08-20 PROCEDURE — 80053 COMPREHEN METABOLIC PANEL: CPT

## 2019-05-13 ENCOUNTER — ANESTHESIA EVENT (OUTPATIENT)
Dept: OPERATING ROOM | Age: 59
End: 2019-05-13
Payer: COMMERCIAL

## 2019-05-13 ASSESSMENT — LIFESTYLE VARIABLES: SMOKING_STATUS: 0

## 2019-05-13 NOTE — ANESTHESIA PRE PROCEDURE
11 mg by mouth daily      Morphine Sulfate ER 15 MG TBEA Take 15 mg by mouth 3 times daily.  metFORMIN (GLUCOPHAGE) 500 MG tablet Take 500 mg by mouth 3 times daily       omeprazole (PRILOSEC) 40 MG delayed release capsule Take 40 mg by mouth daily      simvastatin (ZOCOR) 40 MG tablet Take 40 mg by mouth nightly      ondansetron (ZOFRAN) 4 MG tablet Take 1 tablet by mouth daily as needed for Nausea or Vomiting 20 tablet 0    Albuterol Sulfate (PROAIR HFA IN) Inhale into the lungs as needed      hydroxychloroquine (PLAQUENIL) 200 MG tablet Take by mouth 2 times daily       ezetimibe (ZETIA) 10 MG tablet Take 10 mg by mouth nightly       gemfibrozil (LOPID) 600 MG tablet Take 600 mg by mouth 2 times daily (before meals). No current facility-administered medications for this visit. Allergies:     Allergies   Allergen Reactions    Latex Rash     sensitive       Problem List:    Patient Active Problem List   Diagnosis Code    Lumbar post-laminectomy syndrome M96.1    Neural foraminal stenosis of lumbar spine M99.83    Lumbar radiculopathy left greater than right M54.16    Diabetes mellitus ([address] E11.9    Peripheral neuropathy G62.9    Protruded lumbar disc M51.26    DDD (degenerative disc disease), lumbar M51.36    Facet syndrome, lumbar M47.816    Trigger ring finger of right hand M65.341    Trigger middle finger of left hand M65.332    Sacroiliitis (HCC) bilateral M46.1    Epigastric pain R10.13    Acute meniscal tear of knee S83.209A    Primary osteoarthritis of left knee M17.12       Past Medical History:        Diagnosis Date    Arthritis     Asthma     Diabetes mellitus ([address]     GERD (gastroesophageal reflux disease)     History of blood transfusion     after daughter 28 yrs ago     Hyperlipidemia     Kidney stones     Neuropathy     Pleurisy     Pneumonia     Sleep apnea     no cpap       Past Surgical History:        Procedure Laterality Date    APPENDECTOMY  BACK SURGERY  08/01/2012    L3,4,5    BREAST SURGERY      right lumpectomy benign    CHOLECYSTECTOMY, LAPAROSCOPIC      DILATATION, ESOPHAGUS      FINGER TRIGGER RELEASE Bilateral 10/02/14    HYSTERECTOMY      KNEE ARTHROSCOPY Left 09/20/2016    medial menisectomy, synovectomy, chondroplasty    NERVE BLOCK Left 2/19/2014    left lumbar transforaminal nerve block under x-ray with sedation L3-4, L4-5  #1    NERVE BLOCK Left 2/26/14    tranfsoramihnla #2 w sedation    NERVE BLOCK  04/30/14    internal lumbar facet block #1 with sedation L3-4, L4-5, L5-S1    NERVE BLOCK  05/07/14    bilateral facet block L3-4, L4-5, L5-S1 with IV sedation #2    NERVE BLOCK Bilateral 5 14 14    lumb facet with sedation #3    NERVE BLOCK Bilateral 2 18 15    si inj #1 with anesthesia    NERVE BLOCK Bilateral 2 25 15    si inj #2 with iv sedation    NERVE BLOCK Left 03/28/2018    lumbar transforaminal block #1 with sedation    NERVE BLOCK Left 04/18/2018    lumbar transforaminal nerve block under sedation #2    NERVE BLOCK Right 04/25/2018    lumbar transforaminal #1    NERVE BLOCK Right 05/02/2018    right lumbar transforaminal nerve block under xray #2 L3-5    OTHER SURGICAL HISTORY Left 3/5/2014    left lumbar transforaminal nerve block  under x-ray with sedation  #3 L3-4, L4-5    OTHER SURGICAL HISTORY Left 6/18/2014    left lumbar radiofrequency L3-4, L4-5, L5-S!    OTHER SURGICAL HISTORY Right 7/28/14    L3-S1 radiofrequency    [place] ALTAGRACIA NOSE/CLEFT LIP/TIP Left 3/28/2018    LEFT LUMBAR TRANSFORMANIAL NERVE BLOCK UNDER XRAY #1 L3-4 L4-5 WITH IV SEDATION performed by Familia Hung DO at [address] [facility] NOSE/CLEFT LIP/TIP Left 4/18/2018    LEFT LUMBAR TRANSFORMANIAL NERVE BLOCK UNDER XRAY #2 L3-4 L4-5 WITH IV SEDATION performed by Familia Hung DO at [address] [facility] NOSE/CLEFT LIP/TIP Right 4/25/2018    RIGHT LUMBAR TRANSFORMANIAL NERVE BLOCK UNDER XRAY #1 L3-4 L4-5 WITH IV SEDATION performed by Bethanie Guerrero DO at [address] [facility] NOSE/CLEFT LIP/TIP Right 5/2/2018    RIGHT LUMBAR TRANSFORMANIAL NERVE BLOCK UNDER XRAY #2 L3-4 L4-5 WITH IV SEDATION performed by Bethanie Guerrero DO at [address] [facility]      right by Dr. Sean Griffin History:    Social History     Tobacco Use    Smoking status: Former Smoker     Years: 10.00     Types: Cigarettes     Last attempt to quit: 7/5/2015     Years since quitting: 3.8    Smokeless tobacco: Never Used   Substance Use Topics    Alcohol use: No                                Counseling given: Not Answered      Vital Signs (Current): There were no vitals filed for this visit. BP Readings from Last 3 Encounters:   05/02/18 123/83   05/02/18 119/66   04/25/18 138/81       NPO Status:  >8.H                                                 BMI:   Wt Readings from Last 3 Encounters:   05/02/18 284 lb (128.8 kg)   04/25/18 283 lb (128.4 kg)   04/18/18 282 lb (127.9 kg)     There is no height or weight on file to calculate BMI.    CBC:   Lab Results   Component Value Date    WBC 11.1 03/08/2016    RBC 4.93 03/08/2016    HGB 12.2 03/08/2016    HCT 38.2 03/08/2016    MCV 77.6 03/08/2016    RDW 13.9 03/08/2016     03/08/2016       CMP:   Lab Results   Component Value Date     08/20/2018    K 4.4 08/20/2018    CL 98 08/20/2018    CO2 20 08/20/2018    BUN 14 08/20/2018    CREATININE 0.6 08/20/2018    GFRAA >60 08/20/2018    LABGLOM >60 08/20/2018    GLUCOSE 381 08/20/2018    GLUCOSE 94 07/14/2011    PROT 7.8 08/20/2018    CALCIUM 10.1 08/20/2018    BILITOT 0.4 08/20/2018    ALKPHOS 168 08/20/2018    AST 42 08/20/2018    ALT 52 08/20/2018       POC Tests: No results for input(s): POCGLU, POCNA, POCK, POCCL, POCBUN, POCHEMO, POCHCT in the last 72 hours.     Coags:   Lab Results   Component Value Date    PROTIME 11.1 2016    INR 1.1 2016       HCG (If Applicable): No results found for: PREGTESTUR, PREGSERUM, HCG, HCGQUANT     ABGs: No results found for: PHART, PO2ART, YOS7PJZ, PGT2UTQ, BEART, U5OYDZJF     Type & Screen (If Applicable):  No results found for: LABABO, [address]    Anesthesia Evaluation  Patient summary reviewed  Airway: Mallampati: III  TM distance: >3 FB   Neck ROM: full  Mouth opening: > = 3 FB Dental: normal exam   (+) caps      Pulmonary:normal exam  breath sounds clear to auscultation  (+) pneumonia: resolved,  sleep apnea: on CPAP,  asthma:     (-) not a current smoker                          ROS comment: Former Smoker. Pleurisy. Cardiovascular:  Exercise tolerance: good (>4 METS),   (+) hyperlipidemia      ECG reviewed  Rhythm: regular  Rate: normal                 ROS comment: EKG: Sinus Rhythm with Paroxysmal Atrial Contractions. Neuro/Psych:   (+) neuromuscular disease ( Lumbar post-laminectomy syndrome):,             GI/Hepatic/Renal:   (+) GERD:, renal disease: kidney stones, morbid obesity          Endo/Other:    (+) DiabetesType II DM, , : arthritis: OA., .                 Abdominal:   (+) obese,         Vascular: negative vascular ROS. Department of Anesthesiology  Preprocedure Note       Name:  Adrien Guzman   Age:  60 y.o.  :  1960                                          MRN:  [id number]         Date:  2019      Surgeon: Eliseo Ingram):  India Pino DO    Procedure: Procedure(s):  RIGHT LUMBAR TRANSFORMANIAL NERVE BLOCK UNDER XRAY #2 L3-4 L4-5 WITH IV SEDATION    Medications prior to admission:   Prior to Admission medications    Medication Sig Start Date End Date Taking?  Authorizing Provider   Multiple Vitamins-Minerals (CENTRUM SILVER PO) Take by mouth daily    Historical Provider, MD   Cholecalciferol (VITAMIN D PO) Take by mouth daily    Historical Provider, MD   Tofacitinib Citrate (XELJANZ XR) 11 MG TB24 Take 11 mg by mouth daily    Historical Provider, MD   Morphine Sulfate ER 15 MG TBEA Take 15 mg by mouth 3 times daily. Historical Provider, MD   metFORMIN (GLUCOPHAGE) 500 MG tablet Take 500 mg by mouth 3 times daily     Historical Provider, MD   omeprazole (PRILOSEC) 40 MG delayed release capsule Take 40 mg by mouth daily    Historical Provider, MD   simvastatin (ZOCOR) 40 MG tablet Take 40 mg by mouth nightly    Historical Provider, MD   ondansetron (ZOFRAN) 4 MG tablet Take 1 tablet by mouth daily as needed for Nausea or Vomiting 9/20/16   Gypsy Stanton,    Albuterol Sulfate (PROAIR HFA IN) Inhale into the lungs as needed    Historical Provider, MD   hydroxychloroquine (PLAQUENIL) 200 MG tablet Take by mouth 2 times daily     Historical Provider, MD   ezetimibe (ZETIA) 10 MG tablet Take 10 mg by mouth nightly     Historical Provider, MD   gemfibrozil (LOPID) 600 MG tablet Take 600 mg by mouth 2 times daily (before meals). Historical Provider, MD       Current medications:    Current Outpatient Medications   Medication Sig Dispense Refill    Multiple Vitamins-Minerals (CENTRUM SILVER PO) Take by mouth daily      Cholecalciferol (VITAMIN D PO) Take by mouth daily      Tofacitinib Citrate (XELJANZ XR) 11 MG TB24 Take 11 mg by mouth daily      Morphine Sulfate ER 15 MG TBEA Take 15 mg by mouth 3 times daily.       metFORMIN (GLUCOPHAGE) 500 MG tablet Take 500 mg by mouth 3 times daily       omeprazole (PRILOSEC) 40 MG delayed release capsule Take 40 mg by mouth daily      simvastatin (ZOCOR) 40 MG tablet Take 40 mg by mouth nightly      ondansetron (ZOFRAN) 4 MG tablet Take 1 tablet by mouth daily as needed for Nausea or Vomiting 20 tablet 0    Albuterol Sulfate (PROAIR HFA IN) Inhale into the lungs as needed      hydroxychloroquine (PLAQUENIL) 200 MG tablet Take by mouth 2 times daily       ezetimibe (ZETIA) 10 MG tablet Take 10 mg by mouth nightly       gemfibrozil (LOPID) 600 MG tablet Take 600 mg by mouth 2 times daily (before meals). No current facility-administered medications for this visit. Allergies:     Allergies   Allergen Reactions    Latex Rash     sensitive       Problem List:    Patient Active Problem List   Diagnosis Code    Lumbar post-laminectomy syndrome M96.1    Neural foraminal stenosis of lumbar spine M99.83    Lumbar radiculopathy left greater than right M54.16    Diabetes mellitus ([address] E11.9    Peripheral neuropathy G62.9    Protruded lumbar disc M51.26    DDD (degenerative disc disease), lumbar M51.36    Facet syndrome, lumbar M47.816    Trigger ring finger of right hand M65.341    Trigger middle finger of left hand M65.332    Sacroiliitis (HCC) bilateral M46.1    Epigastric pain R10.13    Acute meniscal tear of knee S83.209A    Primary osteoarthritis of left knee M17.12       Past Medical History:        Diagnosis Date    Arthritis     Asthma     Diabetes mellitus ([address]     GERD (gastroesophageal reflux disease)     History of blood transfusion     after daughter 28 yrs ago     Hyperlipidemia     Kidney stones     Neuropathy     Pleurisy     Pneumonia     Sleep apnea     no cpap       Past Surgical History:        Procedure Laterality Date    APPENDECTOMY      BACK SURGERY  08/01/2012    L3,4,5    BREAST SURGERY      right lumpectomy benign    CHOLECYSTECTOMY, LAPAROSCOPIC      DILATATION, ESOPHAGUS      FINGER TRIGGER RELEASE Bilateral 10/02/14    HYSTERECTOMY      KNEE ARTHROSCOPY Left 09/20/2016    medial menisectomy, synovectomy, chondroplasty    NERVE BLOCK Left 2/19/2014    left lumbar transforaminal nerve block under x-ray with sedation L3-4, L4-5  #1    NERVE BLOCK Left 2/26/14    tranfsoramihnla #2 w sedation    NERVE BLOCK  04/30/14    internal lumbar facet block #1 with sedation L3-4, L4-5, L5-S1    NERVE BLOCK  05/07/14    bilateral facet block L3-4, L4-5, L5-S1 with IV sedation #2    NERVE BLOCK Bilateral 5 14 14    lumb facet with sedation #3    NERVE BLOCK Bilateral 2 18 15    si inj #1 with anesthesia    NERVE BLOCK Bilateral 2 25 15    si inj #2 with iv sedation    NERVE BLOCK Left 03/28/2018    lumbar transforaminal block #1 with sedation    NERVE BLOCK Left 04/18/2018    lumbar transforaminal nerve block under sedation #2    NERVE BLOCK Right 04/25/2018    lumbar transforaminal #1    NERVE BLOCK Right 05/02/2018    right lumbar transforaminal nerve block under xray #2 L3-5    OTHER SURGICAL HISTORY Left 3/5/2014    left lumbar transforaminal nerve block  under x-ray with sedation  #3 L3-4, L4-5    OTHER SURGICAL HISTORY Left 6/18/2014    left lumbar radiofrequency L3-4, L4-5, L5-S!    OTHER SURGICAL HISTORY Right 7/28/14    L3-S1 radiofrequency    [place] ALTAGRACIA NOSE/CLEFT LIP/TIP Left 3/28/2018    LEFT LUMBAR TRANSFORMANIAL NERVE BLOCK UNDER XRAY #1 L3-4 L4-5 WITH IV SEDATION performed by Laveta Necessary, DO at [address] [facility] NOSE/CLEFT LIP/TIP Left 4/18/2018    LEFT LUMBAR TRANSFORMANIAL NERVE BLOCK UNDER XRAY #2 L3-4 L4-5 WITH IV SEDATION performed by Laveta Necessary, DO at [address] NOSE/CLEFT LIP/TIP Right 4/25/2018    RIGHT LUMBAR TRANSFORMANIAL NERVE BLOCK UNDER XRAY #1 L3-4 L4-5 WITH IV SEDATION performed by Laveta Necessary, DO at [address] [facility] NOSE/CLEFT LIP/TIP Right 5/2/2018    RIGHT LUMBAR TRANSFORMANIAL NERVE BLOCK UNDER XRAY #2 L3-4 L4-5 WITH IV SEDATION performed by Laveta Necessary, DO at [address] [facility] [address]      right by Dr. Amaya Martel History:    Social History     Tobacco Use    Smoking status: Former Smoker     Years: 10.00     Types: Cigarettes     Last attempt to quit: 7/5/2015     Years since quitting: 3.8    Smokeless tobacco: Never Used   Substance Use Topics    Alcohol use: No                                Counseling given: Not Answered      Vital Signs (Current):    There were no vitals filed for this visit. BP Readings from Last 3 Encounters:   05/02/18 123/83   05/02/18 119/66   04/25/18 138/81       NPO Status:                                                  BMI:   Wt Readings from Last 3 Encounters:   05/02/18 284 lb (128.8 kg)   04/25/18 283 lb (128.4 kg)   04/18/18 282 lb (127.9 kg)     There is no height or weight on file to calculate BMI.    CBC:   Lab Results   Component Value Date    WBC 11.1 03/08/2016    RBC 4.93 03/08/2016    HGB 12.2 03/08/2016    HCT 38.2 03/08/2016    MCV 77.6 03/08/2016    RDW 13.9 03/08/2016     03/08/2016       CMP:   Lab Results   Component Value Date     08/20/2018    K 4.4 08/20/2018    CL 98 08/20/2018    CO2 20 08/20/2018    BUN 14 08/20/2018    CREATININE 0.6 08/20/2018    GFRAA >60 08/20/2018    LABGLOM >60 08/20/2018    GLUCOSE 381 08/20/2018    GLUCOSE 94 07/14/2011    PROT 7.8 08/20/2018    CALCIUM 10.1 08/20/2018    BILITOT 0.4 08/20/2018    ALKPHOS 168 08/20/2018    AST 42 08/20/2018    ALT 52 08/20/2018       POC Tests: No results for input(s): POCGLU, POCNA, POCK, POCCL, POCBUN, POCHEMO, POCHCT in the last 72 hours. Coags:   Lab Results   Component Value Date    PROTIME 11.1 03/07/2016    INR 1.1 03/07/2016       HCG (If Applicable): No results found for: PREGTESTUR, PREGSERUM, HCG, HCGQUANT     ABGs: No results found for: PHART, PO2ART, WER9MQB, WSO8KUE, BEART, R2SHTTRB     Type & Screen (If Applicable):  No results found for: LABABO, [address]    Anesthesia Evaluation  Patient summary reviewed  Airway: Mallampati: III  TM distance: >3 FB   Neck ROM: full  Mouth opening: > = 3 FB Dental: normal exam   (+) caps      Pulmonary:normal exam  breath sounds clear to auscultation  (+) sleep apnea: on CPAP,  asthma:     (-) not a current smoker                          ROS comment: Former Smoker. Pleurisy.    Cardiovascular:  Exercise tolerance: good (>4 METS),   (+) hyperlipidemia      ECG reviewed  Rhythm: regular  Rate: normal                 ROS comment: EKG: Sinus Rhythm with Paroxysmal Atrial Contractions. Neuro/Psych:   (+) neuromuscular disease ( Lumbar post-laminectomy syndrome):,             GI/Hepatic/Renal:   (+) GERD:, renal disease: kidney stones, morbid obesity          Endo/Other:    (+) DiabetesType II DM, , : arthritis: OA., .                 Abdominal:   (+) obese,         Vascular: negative vascular ROS. Anesthesia Plan      MAC     ASA 3       Induction: intravenous. Anesthetic plan and risks discussed with patient. Plan discussed with CRNA. Denae Madden MD   4/24/2018  DOS STAFF ADDENDUM:    Pt seen and examined, chart reviewed (including anesthesia, drug and allergy history). Anesthetic plan, risks, benefits, alternatives, and personnel involved discussed with patient. Patient verbalized an understanding and agrees to proceed. Plan discussed with care team members and agreed upon. Denae Madden MD  Staff Anesthesiologist  10:03 AM             Anesthesia Plan      MAC     ASA 3       Induction: intravenous. Anesthetic plan and risks discussed with patient. Plan discussed with CRNA. Denae Madden MD   4/24/2018  DOS STAFF ADDENDUM:    Pt seen and examined, chart reviewed (including anesthesia, drug and allergy history). Anesthetic plan, risks, benefits, alternatives, and personnel involved discussed with patient. Patient verbalized an understanding and agrees to proceed. Plan discussed with care team members and agreed upon.     Janae Veliz MD  Staff Anesthesiologist  9:55 AM

## 2019-05-15 ENCOUNTER — ANESTHESIA (OUTPATIENT)
Dept: OPERATING ROOM | Age: 59
End: 2019-05-15
Payer: COMMERCIAL

## 2019-05-15 ENCOUNTER — HOSPITAL ENCOUNTER (OUTPATIENT)
Dept: OPERATING ROOM | Age: 59
Setting detail: OUTPATIENT SURGERY
Discharge: HOME | End: 2019-05-15
Attending: ANESTHESIOLOGY
Payer: COMMERCIAL

## 2019-05-15 ENCOUNTER — HOSPITAL ENCOUNTER (OUTPATIENT)
Age: 59
Setting detail: OUTPATIENT SURGERY
Discharge: HOME | End: 2019-05-15
Attending: ANESTHESIOLOGY | Admitting: ANESTHESIOLOGY
Payer: COMMERCIAL

## 2019-05-15 VITALS
SYSTOLIC BLOOD PRESSURE: 121 MMHG | OXYGEN SATURATION: 97 % | WEIGHT: 282.4 LBS | DIASTOLIC BLOOD PRESSURE: 62 MMHG | BODY MASS INDEX: 45.38 KG/M2 | RESPIRATION RATE: 16 BRPM | HEIGHT: 66 IN | HEART RATE: 70 BPM | TEMPERATURE: 98 F

## 2019-05-15 VITALS
RESPIRATION RATE: 20 BRPM | DIASTOLIC BLOOD PRESSURE: 82 MMHG | TEMPERATURE: 98.6 F | SYSTOLIC BLOOD PRESSURE: 155 MMHG | OXYGEN SATURATION: 95 %

## 2019-05-15 DIAGNOSIS — M51.36 DDD (DEGENERATIVE DISC DISEASE), LUMBAR: ICD-10-CM

## 2019-05-15 LAB — METER GLUCOSE: 144 MG/DL (ref 74–99)

## 2019-05-15 PROCEDURE — 82962 GLUCOSE BLOOD TEST: CPT

## 2019-05-15 PROCEDURE — 2709999900 HC NON-CHARGEABLE SUPPLY: Performed by: ANESTHESIOLOGY

## 2019-05-15 PROCEDURE — 3600000005 HC SURGERY LEVEL 5 BASE: Performed by: ANESTHESIOLOGY

## 2019-05-15 PROCEDURE — 3700000000 HC ANESTHESIA ATTENDED CARE: Performed by: ANESTHESIOLOGY

## 2019-05-15 PROCEDURE — 2500000003 HC RX 250 WO HCPCS: Performed by: ANESTHESIOLOGY

## 2019-05-15 PROCEDURE — 3209999900 FLUORO FOR SURGICAL PROCEDURES

## 2019-05-15 PROCEDURE — 6360000002 HC RX W HCPCS: Performed by: ANESTHESIOLOGY

## 2019-05-15 PROCEDURE — 2580000003 HC RX 258: Performed by: ANESTHESIOLOGY

## 2019-05-15 PROCEDURE — 6360000004 HC RX CONTRAST MEDICATION: Performed by: ANESTHESIOLOGY

## 2019-05-15 PROCEDURE — 6360000002 HC RX W HCPCS: Performed by: NURSE ANESTHETIST, CERTIFIED REGISTERED

## 2019-05-15 PROCEDURE — 7100000011 HC PHASE II RECOVERY - ADDTL 15 MIN: Performed by: ANESTHESIOLOGY

## 2019-05-15 PROCEDURE — 2500000003 HC RX 250 WO HCPCS: Performed by: NURSE ANESTHETIST, CERTIFIED REGISTERED

## 2019-05-15 PROCEDURE — 7100000010 HC PHASE II RECOVERY - FIRST 15 MIN: Performed by: ANESTHESIOLOGY

## 2019-05-15 RX ORDER — LIDOCAINE HYDROCHLORIDE 10 MG/ML
INJECTION, SOLUTION EPIDURAL; INFILTRATION; INTRACAUDAL; PERINEURAL PRN
Status: DISCONTINUED | OUTPATIENT
Start: 2019-05-15 | End: 2019-05-15 | Stop reason: ALTCHOICE

## 2019-05-15 RX ORDER — LIDOCAINE HYDROCHLORIDE 20 MG/ML
INJECTION, SOLUTION INTRAVENOUS PRN
Status: DISCONTINUED | OUTPATIENT
Start: 2019-05-15 | End: 2019-05-15 | Stop reason: SDUPTHER

## 2019-05-15 RX ORDER — MIDAZOLAM HYDROCHLORIDE 1 MG/ML
INJECTION INTRAMUSCULAR; INTRAVENOUS PRN
Status: DISCONTINUED | OUTPATIENT
Start: 2019-05-15 | End: 2019-05-15 | Stop reason: SDUPTHER

## 2019-05-15 RX ORDER — SODIUM CHLORIDE, SODIUM LACTATE, POTASSIUM CHLORIDE, CALCIUM CHLORIDE 600; 310; 30; 20 MG/100ML; MG/100ML; MG/100ML; MG/100ML
INJECTION, SOLUTION INTRAVENOUS CONTINUOUS
Status: DISCONTINUED | OUTPATIENT
Start: 2019-05-15 | End: 2019-05-15 | Stop reason: HOSPADM

## 2019-05-15 RX ORDER — PROPOFOL 10 MG/ML
INJECTION, EMULSION INTRAVENOUS PRN
Status: DISCONTINUED | OUTPATIENT
Start: 2019-05-15 | End: 2019-05-15 | Stop reason: SDUPTHER

## 2019-05-15 RX ORDER — ALFENTANIL HYDROCHLORIDE 500 UG/ML
INJECTION INTRAVENOUS PRN
Status: DISCONTINUED | OUTPATIENT
Start: 2019-05-15 | End: 2019-05-15 | Stop reason: SDUPTHER

## 2019-05-15 RX ADMIN — SODIUM CHLORIDE, POTASSIUM CHLORIDE, SODIUM LACTATE AND CALCIUM CHLORIDE: 600; 310; 30; 20 INJECTION, SOLUTION INTRAVENOUS at 09:55

## 2019-05-15 RX ADMIN — ALFENTANIL HYDROCHLORIDE 250 MCG: 500 INJECTION INTRAVENOUS at 10:38

## 2019-05-15 RX ADMIN — ALFENTANIL HYDROCHLORIDE 250 MCG: 500 INJECTION INTRAVENOUS at 10:39

## 2019-05-15 RX ADMIN — MIDAZOLAM 2 MG: 1 INJECTION INTRAMUSCULAR; INTRAVENOUS at 10:38

## 2019-05-15 RX ADMIN — PROPOFOL 20 MG: 10 INJECTION, EMULSION INTRAVENOUS at 10:40

## 2019-05-15 RX ADMIN — ALFENTANIL HYDROCHLORIDE 250 MCG: 500 INJECTION INTRAVENOUS at 10:41

## 2019-05-15 RX ADMIN — LIDOCAINE HYDROCHLORIDE 10 MG: 20 INJECTION, SOLUTION INTRAVENOUS at 10:40

## 2019-05-15 RX ADMIN — ALFENTANIL HYDROCHLORIDE 250 MCG: 500 INJECTION INTRAVENOUS at 10:40

## 2019-05-15 RX ADMIN — PROPOFOL 20 MG: 10 INJECTION, EMULSION INTRAVENOUS at 10:41

## 2019-05-15 ASSESSMENT — PULMONARY FUNCTION TESTS
PIF_VALUE: 0

## 2019-05-15 ASSESSMENT — PAIN - FUNCTIONAL ASSESSMENT: PAIN_FUNCTIONAL_ASSESSMENT: 0-10

## 2019-05-15 ASSESSMENT — PAIN SCALES - GENERAL
PAINLEVEL_OUTOF10: 0
PAINLEVEL_OUTOF10: 0

## 2019-05-15 NOTE — ANESTHESIA POSTPROCEDURE EVALUATION
Department of Anesthesiology  Postprocedure Note    Patient: Feliciano Galo  MRN: [de-identified]  YOB: 1960  Date of evaluation: 5/15/2019  Time:  11:04 AM     Procedure Summary     Date:  05/15/19 Room / Location:  [address] [place] [address] 04 / [address] [facility]    Anesthesia Start:  1037 Anesthesia Stop:  3312    Procedure:  LEFT LUMBAR TRANSFORAMINAL NERVE BLOCK L3-4 L4-5 WITH IV SEDATION (CPT 59256) (Left ) Diagnosis:  (POST LAMINECTOMY SYNDRONE)    Surgeon:  Nic Ribera DO Responsible Provider:  Selvin Araujo MD    Anesthesia Type:  MAC ASA Status:  3          Anesthesia Type: MAC    Jp Phase I: Jp Score: 10    Jp Phase II:      Last vitals: Reviewed and per EMR flowsheets.        Anesthesia Post Evaluation    Patient location during evaluation: PACU  Patient participation: complete - patient participated  Level of consciousness: awake and alert  Airway patency: patent  Nausea & Vomiting: no vomiting and no nausea  Complications: no  Cardiovascular status: hemodynamically stable  Respiratory status: acceptable  Hydration status: stable

## 2019-05-15 NOTE — OP NOTE
Néstor Saldana    5/15/2019    Preoperative Diagnoses: Lumbar Radiculopathy Left, Neural foraminal Stenosis Lumbar Spine , Protruding/ DDD Lumbar Spine     Postoperative Diagnoses: Same     Procedure Performed: #1 Therapeutic lumbar transforaminal epidural on the Left done under direct fluoroscopic guidance including an epidurogram report. Anesthesia: Local with monitored anesthesia care     Brief History:   Froy Carrillo comes in today, 5/15/2019, to The [address] [place] [address] for the above procedure. She was explained all of this in great detail including potential complications and did request that we proceed. Her complete History & Physical examination was reviewed and it is unchanged. Description of Procedure:    Tres Gonzalez was taken to the procedure room at the [address] [place] [address] where with the assistance of a nurse, she was placed in the prone position with the head turned to the right. The  lumbar area was prepped and draped in the usual sterile manner. A time-out was performed and confirmed the patients name, date of birth, the procedure to be performed and skin marked for appropriate site and side of procedure. All questions and concerns were answered and the patient requested we proceed. A #27 gauge ½ inch local needle using Xylocaine 1%  2ml. was used for local skin wheal.     A #22-gauge 3 ½ inch disposable BD spinal needle was introduced paraspinally under direct fluoroscopic guidance with three-dimensional guidance to the cephalad-most portion of the neuroforamen of Left L3-4, L4-5 in sequential order. After 1 ml. of Omnipaque 180 dye was used to confirm that the tip of the needle was in the epidural space and not subarachnoid or intravascular with a negative aspiration test. Nine ml. of 0.9% Normal Saline mixed with Dexamethasone 10 mg. 1 ml. was injected at the L-3, L-4 and L-5 level.      Following completion of the same it was clear to see under direct fluoroscopic guidance that there was improvement in the neuroforminal stenosis and flow of dye down the nerve root following completion of the therapeutic transforaminal epidural injection. Leatha Willard showed no signs of complications. The patient will be reassessed  and we will proceed accordingly at that time or sooner if need be. Leatha Willard has been given discharge instructions with their next scheduled appointment.     Electronically signed by Yariel Marinelli

## 2019-05-15 NOTE — H&P
Update History & Physical    The patient's History and Physical of 4/16/19 was reviewed with the patient and there were no significant changes. I examined the patient and there were no significant changes from the previous History and Physical.    Plan: The risk, benefits, expected outcome, and alternative to the recommended procedure have been discussed with the patient. Patient understands and wants to proceed with the procedure.       Electronically signed by Dimas Booth DO on 5/15/19 at 9:44 AM

## 2019-07-23 ENCOUNTER — ANESTHESIA EVENT (OUTPATIENT)
Dept: OPERATING ROOM | Age: 59
End: 2019-07-23
Payer: COMMERCIAL

## 2019-07-23 NOTE — ANESTHESIA PRE PROCEDURE
Department of Anesthesiology  Preprocedure Note       Name:  Samantha Boles   Age:  60 y.o.  :  1960                                          MRN:  [id number]         Date:  2019      Surgeon: Dennis Beck):  Anamaria Ayoub DO    Procedure: TRANSFORAMINAL LUMBAR LEFT AT L3-4 AND L4-5 WITH IV SEDATION (Left )    Medications prior to admission:   Prior to Admission medications    Medication Sig Start Date End Date Taking? Authorizing Provider   Semaglutide CAPTAIN KASIE RENEE [facility]) 0.25 or 0.5 MG/DOSE SOPN Inject into the skin once a week  in AM   Yes Historical Provider, MD   Multiple Vitamins-Minerals (CENTRUM SILVER PO) Take by mouth daily    Historical Provider, MD   Cholecalciferol (VITAMIN D PO) Take by mouth daily    Historical Provider, MD   Tofacitinib Citrate (XELJANZ XR) 11 MG TB24 Take 11 mg by mouth daily    Historical Provider, MD   Morphine Sulfate ER 15 MG TBEA Take 15 mg by mouth 3 times daily. Historical Provider, MD   metFORMIN (GLUCOPHAGE) 500 MG tablet Take 500 mg by mouth 3 times daily     Historical Provider, MD   omeprazole (PRILOSEC) 40 MG delayed release capsule Take 40 mg by mouth daily    Historical Provider, MD   simvastatin (ZOCOR) 40 MG tablet Take 40 mg by mouth nightly    Historical Provider, MD   ondansetron (ZOFRAN) 4 MG tablet Take 1 tablet by mouth daily as needed for Nausea or Vomiting 16   Rodri Kruse DO   Albuterol Sulfate (PROAIR HFA IN) Inhale into the lungs as needed    Historical Provider, MD   hydroxychloroquine (PLAQUENIL) 200 MG tablet Take by mouth 2 times daily     Historical Provider, MD   ezetimibe (ZETIA) 10 MG tablet Take 10 mg by mouth nightly     Historical Provider, MD   gemfibrozil (LOPID) 600 MG tablet Take 600 mg by mouth 2 times daily (before meals). Historical Provider, MD       Current medications:    No current facility-administered medications for this encounter.       Current Outpatient Medications   Medication Sig Dispense Refill LIP/TIP Left 3/28/2018    LEFT LUMBAR TRANSFORMANIAL NERVE BLOCK UNDER XRAY #1 L3-4 L4-5 WITH IV SEDATION performed by Maxime Willard DO at [address] [facility] NOSE/CLEFT LIP/TIP Left 2018    LEFT LUMBAR TRANSFORMANIAL NERVE BLOCK UNDER XRAY #2 L3-4 L4-5 WITH IV SEDATION performed by Maxime Willard DO at [address] [facility] NOSE/CLEFT LIP/TIP Right 2018    RIGHT LUMBAR TRANSFORMANIAL NERVE BLOCK UNDER XRAY #1 L3-4 L4-5 WITH IV SEDATION performed by Maxime Willard DO at [address] [facility] NOSE/CLEFT LIP/TIP Right 2018    RIGHT LUMBAR TRANSFORMANIAL NERVE BLOCK UNDER XRAY #2 L3-4 L4-5 WITH IV SEDATION performed by Maxime Willard DO at [address]      right by Dr. Chris Melara History:    Social History     Tobacco Use    Smoking status: Former Smoker     Years: 10.00     Types: Cigarettes     Last attempt to quit: 2015     Years since quittin.0    Smokeless tobacco: Never Used   Substance Use Topics    Alcohol use: No                                Counseling given: Not Answered      Vital Signs (Current):   Vitals:    19 1352   Weight: 270 lb (122.5 kg)   Height: 5' 6\" (1.676 m)                                              BP Readings from Last 3 Encounters:   05/15/19 (!) 155/82   05/15/19 121/62   18 123/83       NPO Status:  >8.H                                                                               BMI:   Wt Readings from Last 3 Encounters:   05/15/19 282 lb 6.4 oz (128.1 kg)   18 284 lb (128.8 kg)   18 283 lb (128.4 kg)     Body mass index is 43.58 kg/m².     CBC:   Lab Results   Component Value Date    WBC 11.1 2016    RBC 4.93 2016    HGB 12.2 2016    HCT 38.2 2016    MCV 77.6 2016    RDW 13.9 2016     2016       CMP:   Lab Results   Component Value Date     2018    K 4.4

## 2019-07-24 ENCOUNTER — HOSPITAL ENCOUNTER (OUTPATIENT)
Dept: OPERATING ROOM | Age: 59
Setting detail: OUTPATIENT SURGERY
Discharge: HOME | End: 2019-07-24
Attending: ANESTHESIOLOGY
Payer: COMMERCIAL

## 2019-07-24 ENCOUNTER — ANESTHESIA (OUTPATIENT)
Dept: OPERATING ROOM | Age: 59
End: 2019-07-24
Payer: COMMERCIAL

## 2019-07-24 ENCOUNTER — HOSPITAL ENCOUNTER (OUTPATIENT)
Age: 59
Setting detail: OUTPATIENT SURGERY
Discharge: HOME | End: 2019-07-24
Attending: ANESTHESIOLOGY | Admitting: ANESTHESIOLOGY
Payer: COMMERCIAL

## 2019-07-24 VITALS
SYSTOLIC BLOOD PRESSURE: 108 MMHG | WEIGHT: 277.6 LBS | RESPIRATION RATE: 14 BRPM | DIASTOLIC BLOOD PRESSURE: 61 MMHG | OXYGEN SATURATION: 95 % | TEMPERATURE: 97 F | HEIGHT: 66 IN | HEART RATE: 60 BPM | BODY MASS INDEX: 44.61 KG/M2

## 2019-07-24 VITALS
OXYGEN SATURATION: 95 % | RESPIRATION RATE: 14 BRPM | SYSTOLIC BLOOD PRESSURE: 133 MMHG | DIASTOLIC BLOOD PRESSURE: 80 MMHG

## 2019-07-24 DIAGNOSIS — M51.36 LUMBAR DEGENERATIVE DISC DISEASE: ICD-10-CM

## 2019-07-24 LAB — METER GLUCOSE: 134 MG/DL (ref 74–99)

## 2019-07-24 PROCEDURE — 2580000003 HC RX 258: Performed by: ANESTHESIOLOGY

## 2019-07-24 PROCEDURE — 3209999900 FLUORO FOR SURGICAL PROCEDURES

## 2019-07-24 PROCEDURE — 2500000003 HC RX 250 WO HCPCS: Performed by: ANESTHESIOLOGY

## 2019-07-24 PROCEDURE — 3700000001 HC ADD 15 MINUTES (ANESTHESIA): Performed by: ANESTHESIOLOGY

## 2019-07-24 PROCEDURE — 82962 GLUCOSE BLOOD TEST: CPT

## 2019-07-24 PROCEDURE — 2709999900 HC NON-CHARGEABLE SUPPLY: Performed by: ANESTHESIOLOGY

## 2019-07-24 PROCEDURE — 2500000003 HC RX 250 WO HCPCS: Performed by: NURSE ANESTHETIST, CERTIFIED REGISTERED

## 2019-07-24 PROCEDURE — 6360000002 HC RX W HCPCS: Performed by: NURSE ANESTHETIST, CERTIFIED REGISTERED

## 2019-07-24 PROCEDURE — 6360000002 HC RX W HCPCS: Performed by: ANESTHESIOLOGY

## 2019-07-24 PROCEDURE — 7100000011 HC PHASE II RECOVERY - ADDTL 15 MIN: Performed by: ANESTHESIOLOGY

## 2019-07-24 PROCEDURE — 7100000010 HC PHASE II RECOVERY - FIRST 15 MIN: Performed by: ANESTHESIOLOGY

## 2019-07-24 PROCEDURE — 3700000000 HC ANESTHESIA ATTENDED CARE: Performed by: ANESTHESIOLOGY

## 2019-07-24 PROCEDURE — 3600000015 HC SURGERY LEVEL 5 ADDTL 15MIN: Performed by: ANESTHESIOLOGY

## 2019-07-24 PROCEDURE — 3600000005 HC SURGERY LEVEL 5 BASE: Performed by: ANESTHESIOLOGY

## 2019-07-24 RX ORDER — FENTANYL CITRATE 50 UG/ML
50 INJECTION, SOLUTION INTRAMUSCULAR; INTRAVENOUS EVERY 5 MIN PRN
Status: DISCONTINUED | OUTPATIENT
Start: 2019-07-24 | End: 2019-07-24 | Stop reason: HOSPADM

## 2019-07-24 RX ORDER — MORPHINE SULFATE 2 MG/ML
1 INJECTION, SOLUTION INTRAMUSCULAR; INTRAVENOUS EVERY 5 MIN PRN
Status: DISCONTINUED | OUTPATIENT
Start: 2019-07-24 | End: 2019-07-24 | Stop reason: HOSPADM

## 2019-07-24 RX ORDER — MEPERIDINE HYDROCHLORIDE 50 MG/ML
12.5 INJECTION INTRAMUSCULAR; INTRAVENOUS; SUBCUTANEOUS EVERY 5 MIN PRN
Status: DISCONTINUED | OUTPATIENT
Start: 2019-07-24 | End: 2019-07-24 | Stop reason: HOSPADM

## 2019-07-24 RX ORDER — ALFENTANIL HYDROCHLORIDE 500 UG/ML
INJECTION INTRAVENOUS PRN
Status: DISCONTINUED | OUTPATIENT
Start: 2019-07-24 | End: 2019-07-24 | Stop reason: SDUPTHER

## 2019-07-24 RX ORDER — PROMETHAZINE HYDROCHLORIDE 25 MG/ML
25 INJECTION, SOLUTION INTRAMUSCULAR; INTRAVENOUS
Status: DISCONTINUED | OUTPATIENT
Start: 2019-07-24 | End: 2019-07-24 | Stop reason: HOSPADM

## 2019-07-24 RX ORDER — DIPHENHYDRAMINE HYDROCHLORIDE 50 MG/ML
12.5 INJECTION INTRAMUSCULAR; INTRAVENOUS
Status: DISCONTINUED | OUTPATIENT
Start: 2019-07-24 | End: 2019-07-24 | Stop reason: HOSPADM

## 2019-07-24 RX ORDER — LIDOCAINE HYDROCHLORIDE 10 MG/ML
INJECTION, SOLUTION EPIDURAL; INFILTRATION; INTRACAUDAL; PERINEURAL PRN
Status: DISCONTINUED | OUTPATIENT
Start: 2019-07-24 | End: 2019-07-24 | Stop reason: ALTCHOICE

## 2019-07-24 RX ORDER — HYDROMORPHONE HYDROCHLORIDE 1 MG/ML
0.25 INJECTION, SOLUTION INTRAMUSCULAR; INTRAVENOUS; SUBCUTANEOUS EVERY 5 MIN PRN
Status: DISCONTINUED | OUTPATIENT
Start: 2019-07-24 | End: 2019-07-24 | Stop reason: HOSPADM

## 2019-07-24 RX ORDER — SODIUM CHLORIDE, SODIUM LACTATE, POTASSIUM CHLORIDE, CALCIUM CHLORIDE 600; 310; 30; 20 MG/100ML; MG/100ML; MG/100ML; MG/100ML
INJECTION, SOLUTION INTRAVENOUS CONTINUOUS
Status: DISCONTINUED | OUTPATIENT
Start: 2019-07-24 | End: 2019-07-24 | Stop reason: HOSPADM

## 2019-07-24 RX ORDER — HYDRALAZINE HYDROCHLORIDE 20 MG/ML
5 INJECTION INTRAMUSCULAR; INTRAVENOUS EVERY 10 MIN PRN
Status: DISCONTINUED | OUTPATIENT
Start: 2019-07-24 | End: 2019-07-24 | Stop reason: HOSPADM

## 2019-07-24 RX ORDER — LABETALOL 20 MG/4 ML (5 MG/ML) INTRAVENOUS SYRINGE
5 EVERY 10 MIN PRN
Status: DISCONTINUED | OUTPATIENT
Start: 2019-07-24 | End: 2019-07-24 | Stop reason: HOSPADM

## 2019-07-24 RX ORDER — OXYCODONE HYDROCHLORIDE AND ACETAMINOPHEN 5; 325 MG/1; MG/1
2 TABLET ORAL EVERY 4 HOURS PRN
Status: DISCONTINUED | OUTPATIENT
Start: 2019-07-24 | End: 2019-07-24 | Stop reason: HOSPADM

## 2019-07-24 RX ORDER — PROPOFOL 10 MG/ML
INJECTION, EMULSION INTRAVENOUS PRN
Status: DISCONTINUED | OUTPATIENT
Start: 2019-07-24 | End: 2019-07-24 | Stop reason: SDUPTHER

## 2019-07-24 RX ORDER — MIDAZOLAM HYDROCHLORIDE 1 MG/ML
INJECTION INTRAMUSCULAR; INTRAVENOUS PRN
Status: DISCONTINUED | OUTPATIENT
Start: 2019-07-24 | End: 2019-07-24 | Stop reason: SDUPTHER

## 2019-07-24 RX ADMIN — SODIUM CHLORIDE, POTASSIUM CHLORIDE, SODIUM LACTATE AND CALCIUM CHLORIDE: 600; 310; 30; 20 INJECTION, SOLUTION INTRAVENOUS at 11:32

## 2019-07-24 RX ADMIN — PROPOFOL 30 MG: 10 INJECTION, EMULSION INTRAVENOUS at 12:37

## 2019-07-24 RX ADMIN — PROPOFOL 20 MG: 10 INJECTION, EMULSION INTRAVENOUS at 12:40

## 2019-07-24 RX ADMIN — ALFENTANIL HYDROCHLORIDE 250 MCG: 500 INJECTION INTRAVENOUS at 12:30

## 2019-07-24 RX ADMIN — PROPOFOL 30 MG: 10 INJECTION, EMULSION INTRAVENOUS at 12:46

## 2019-07-24 RX ADMIN — SODIUM CHLORIDE, POTASSIUM CHLORIDE, SODIUM LACTATE AND CALCIUM CHLORIDE: 600; 310; 30; 20 INJECTION, SOLUTION INTRAVENOUS at 12:47

## 2019-07-24 RX ADMIN — MIDAZOLAM 2 MG: 1 INJECTION INTRAMUSCULAR; INTRAVENOUS at 12:29

## 2019-07-24 RX ADMIN — PROPOFOL 30 MG: 10 INJECTION, EMULSION INTRAVENOUS at 12:32

## 2019-07-24 RX ADMIN — ALFENTANIL HYDROCHLORIDE 250 MCG: 500 INJECTION INTRAVENOUS at 12:32

## 2019-07-24 RX ADMIN — PROPOFOL 30 MG: 10 INJECTION, EMULSION INTRAVENOUS at 12:44

## 2019-07-24 ASSESSMENT — PULMONARY FUNCTION TESTS
PIF_VALUE: 0

## 2019-07-24 ASSESSMENT — PAIN SCALES - GENERAL
PAINLEVEL_OUTOF10: 0

## 2019-07-24 ASSESSMENT — PAIN - FUNCTIONAL ASSESSMENT: PAIN_FUNCTIONAL_ASSESSMENT: 0-10

## 2019-07-24 NOTE — H&P
Update History & Physical    The patient's History and Physical of 7/16/19 was reviewed with the patient and there were no significant changes. I examined the patient and there were no significant changes from the previous History and Physical.    Plan: The risk, benefits, expected outcome, and alternative to the recommended procedure have been discussed with the patient. Patient understands and wants to proceed with the procedure.       Electronically signed by Idania Floyd DO on 7/24/19 at 11:17 AM

## 2019-07-24 NOTE — OP NOTE
Jl Saldana    7/24/2019    Preoperative Diagnoses: Lumbar Radiculopathy Left, Neural foraminal Stenosis Lumbar Spine , Protruding/ DDD Lumbar Spine     Postoperative Diagnoses: Same     Procedure Performed: #2 Therapeutic lumbar transforaminal epidural on the Left done under direct fluoroscopic guidance including an epidurogram report. Anesthesia: Local with monitored anesthesia care     Brief History:   Bennett Cleary comes in today, 7/24/2019, to The [address] [place] [address] for the above procedure. She was explained all of this in great detail including potential complications and did request that we proceed. The patient states received 75% pain relief following last procedure. Her complete History & Physical examination was reviewed and it is unchanged. Description of Procedure:    Romina Weber was taken to the procedure room at the [address] [place] [address] where with the assistance of a nurse, she was placed in the prone position with the head turned to the right. The  lumbar area was prepped and draped in the usual sterile manner. A time-out was performed and confirmed the patients name, date of birth, the procedure to be performed and skin marked for appropriate site and side of procedure. All questions and concerns were answered and the patient requested we proceed. A #27 gauge ½ inch local needle using Xylocaine 1%  2ml. was used for local skin wheal.     A #22-gauge 3 ½ inch disposable BD spinal needle was introduced paraspinally under direct fluoroscopic guidance with three-dimensional guidance to the cephalad-most portion of the neuroforamen of Left L3-4, L4-5 in sequential order. After 1 ml. of Omnipaque 180 dye was used to confirm that the tip of the needle was in the epidural space and not subarachnoid or intravascular with a negative aspiration test. Nine ml. of 0.9% Normal Saline mixed with Dexamethasone 10 mg. 1 ml. was injected at the L-3, L-4 and L-5 level.

## 2020-09-24 ENCOUNTER — OFFICE VISIT (OUTPATIENT)
Dept: SURGERY | Age: 60
End: 2020-09-24
Payer: COMMERCIAL

## 2020-09-24 VITALS — RESPIRATION RATE: 22 BRPM | TEMPERATURE: 97.6 F | HEIGHT: 66 IN | BODY MASS INDEX: 44.95 KG/M2 | WEIGHT: 279.7 LBS

## 2020-09-24 PROCEDURE — 99203 OFFICE O/P NEW LOW 30 MIN: CPT | Performed by: PLASTIC SURGERY

## 2020-09-24 RX ORDER — CETIRIZINE HYDROCHLORIDE 10 MG/1
10 TABLET ORAL DAILY
COMMUNITY

## 2020-09-24 RX ORDER — FUROSEMIDE 20 MG/1
20 TABLET ORAL 2 TIMES DAILY
COMMUNITY
End: 2022-07-15

## 2020-09-24 RX ORDER — OXYCODONE HYDROCHLORIDE 5 MG/1
5 TABLET ORAL PRN
COMMUNITY
End: 2022-07-15

## 2020-09-24 RX ORDER — EXENATIDE 2 MG/.65ML
INJECTION, SUSPENSION, EXTENDED RELEASE SUBCUTANEOUS WEEKLY
COMMUNITY

## 2020-09-24 RX ORDER — TRAZODONE HYDROCHLORIDE 50 MG/1
50 TABLET ORAL NIGHTLY
COMMUNITY
End: 2022-07-15

## 2020-09-24 RX ORDER — PHENOL 1.4 %
1 AEROSOL, SPRAY (ML) MUCOUS MEMBRANE DAILY
COMMUNITY

## 2020-09-24 RX ORDER — LANOLIN ALCOHOL/MO/W.PET/CERES
1000 CREAM (GRAM) TOPICAL DAILY
COMMUNITY

## 2020-09-24 RX ORDER — ASCORBIC ACID 500 MG
1000 TABLET ORAL DAILY
COMMUNITY

## 2020-09-24 NOTE — PROGRESS NOTES
Department of Plastic Surgery - Adult  Attending Consult Note      CHIEF COMPLAINT:   Mass on left forehead    History Obtained From:  patient    HISTORY OF PRESENT ILLNESS:                The patient is a 63 y.o. female who presents with left forehead cyst.  The patient states that they first noticed the mass 1 year ago. It has slightly grown in size since they first noticed the mass. The mass has not had a history of discharge. The pt has not had the mass biopsied previously. The patient states the mass is not painfull. The pt denies any associated symptoms. Patient has diabetes and stated that her blood sugar  this AM was 172 and her HbA1C taken 2 months ago was at 11. Patient is a non-smoker.     Past Medical History:    Past Medical History:   Diagnosis Date    Arthritis     Asthma     Diabetes mellitus ([address]     GERD (gastroesophageal reflux disease)     History of blood transfusion     after daughter 28 yrs ago     Hyperlipidemia     Kidney stones     Neuropathy     Pleurisy     Pneumonia     Sleep apnea     no cpap     Past Surgical History:    Past Surgical History:   Procedure Laterality Date    ANESTHESIA NERVE BLOCK Left 5/15/2019    LEFT LUMBAR TRANSFORAMINAL NERVE BLOCK L3-4 L4-5 WITH IV SEDATION (CPT 87841) performed by Lauren Solis DO at [address] [place] [address] Left 7/24/2019    TRANSFORAMINAL LUMBAR LEFT AT L3-4 AND L4-5 WITH IV SEDATION performed by Lauren Solis DO at [place] [address]  08/01/2012    L3,4,5    BREAST SURGERY      right lumpectomy benign    CHOLECYSTECTOMY, LAPAROSCOPIC      DILATATION, ESOPHAGUS      FINGER TRIGGER RELEASE Bilateral 10/02/14    HYSTERECTOMY      KNEE ARTHROSCOPY Left 09/20/2016    medial menisectomy, synovectomy, chondroplasty    NERVE BLOCK Left 2/19/2014    left lumbar transforaminal nerve block under x-ray with sedation L3-4, L4-5  #1    NERVE BLOCK Left 2/26/14 facility-administered medications for this visit.       Allergies:  Latex    Social History:   Social History     Socioeconomic History    Marital status:      Spouse name: Not on file    Number of children: 2    Years of education: Not on file    Highest education level: Not on file   Occupational History    Occupation:      Comment: [address] Needs    Financial resource strain: Not on file    Food insecurity     Worry: Not on file     Inability: Not on file   [employer] needs     Medical: Not on file     Non-medical: Not on file   Tobacco Use    Smoking status: Former Smoker     Years: 10.00     Types: Cigarettes     Last attempt to quit: 2015     Years since quittin.2    Smokeless tobacco: Never Used   Substance and Sexual Activity    Alcohol use: No    Drug use: No    Sexual activity: Yes     Partners: Male   Lifestyle    Physical activity     Days per week: Not on file     Minutes per session: Not on file    Stress: Not on file   Relationships    Social connections     Talks on phone: Not on file     Gets together: Not on file     Attends Gnosticist service: Not on file     Active member of club or organization: Not on file     Attends meetings of clubs or organizations: Not on file     Relationship status: Not on file    Intimate partner violence     Fear of current or ex partner: Not on file     Emotionally abused: Not on file     Physically abused: Not on file     Forced sexual activity: Not on file   Other Topics Concern    Not on file   Social History Narrative    Not on file     Family History:   Family History   Problem Relation Age of Onset    Diabetes Mother     Hypertension Mother     Heart Disease Mother         father, brother, materanl grandmother    Heart Failure Mother     Cancer Mother         skin cancer     Hypertension Father     Heart Disease Father     Heart Failure Father     Heart Disease Brother     No Known Problems Other     Cancer Sister         skin cancer uknown type     Cancer Brother         lung,colon,skin cancer        REVIEW OF SYSTEMS:    CONSTITUTIONAL:  negative for  fevers, chills, sweats and fatigue  EYES: negative for dipolpia or acute vision loss. RESPIRATORY:  negative for  dry cough, cough with sputum, dyspnea, wheezing and chest pain  HENT:negative for pain, headache, difficulty swallowing or nose bleeds. CARDIOVASCULAR:  negative for  chest pain, dyspnea, palpitations, syncope  GASTROINTESTINAL:  negative for nausea, vomiting, change in bowel habits, diarrhea, constipation and abdominal pain  EXTREMITIES: negative for edema  MUSCULOSKELETAL: negative for muscle weakness  SKIN: positive for lesion. negative for itching or rashes. HEME: negative for easy brusing or bleeding  BEHAVIOR/PSYCH:  negative for poor appetite, increased appetite, decreased sleep and poor concentration  PSYCH: Alert and oriented to person place and time    I have reviewed the chief complaint and history of present illness including (ROS and PFSH) and vital documentation by my staff and I agree with their documentation and have added when applicable. PHYSICAL EXAM:            PHYSICAL EXAM:    VITALS:  Temp 97.6 °F (36.4 °C) (Temporal)   Resp 22   Ht 5' 6\" (1.676 m)   Wt 279 lb 11.2 oz (126.9 kg)   Breastfeeding No   BMI 45.14 kg/m²   CONSTITUTIONAL:  awake, alert, cooperative, no apparent distress, and appears stated age  EYES: PERRLA, EOMI, no signs of occular infection  LUNGS:  No increased work of breathing, good air exchange, clear to auscultation bilaterally, no crackles or wheezing  CARDIOVASCULAR:  Normal apical impulse, regular rate and rhythm,   EXTREMITIES: no signs of clubbing or cyanosis. MUSCULOSKELETAL: negative for flaccid muscle tone or spastic movements. NEURO: Cranial nerves II-XII grossly intact. No signs of agitated mood.      SKIN: subcutaneous mass of left forehead-  8mm x  10mm x 1mm, raised, no signs of bleeding,drainage or infection. non tender to palpation. Mobile, subcutaneous , nonpulsitile      DATA:    Labs: CBC:   Lab Results   Component Value Date    WBC 11.1 03/08/2016    RBC 4.93 03/08/2016    HGB 12.2 03/08/2016    HCT 38.2 03/08/2016    MCV 77.6 03/08/2016    MCH 24.8 03/08/2016    MCHC 32.0 03/08/2016    RDW 13.9 03/08/2016     03/08/2016    MPV 8.1 03/08/2016     BMP:    Lab Results   Component Value Date     08/20/2018    K 4.4 08/20/2018    CL 98 08/20/2018    CO2 20 08/20/2018    BUN 14 08/20/2018    LABALBU 4.8 08/20/2018    LABALBU 4.2 07/14/2011    CREATININE 0.6 08/20/2018    CALCIUM 10.1 08/20/2018    GFRAA >60 08/20/2018    LABGLOM >60 08/20/2018    GLUCOSE 381 08/20/2018    GLUCOSE 94 07/14/2011       Radiology Review:  No radiology needed at this time    IMPRESSION/RECOMMENDATIONS:          Diagnosis  -) Subcutaneous mass of left forehead      -The patient was counseled on the need for surgical intervention to have the mass removed and biopsied.   -The risks, benefits and options were discussed with the pt. The risks included but not limited to pain, bleeding, infection, heavy scarring, damage to surrounding structures, fluid collections, asymmetry, and need for further procedures. All of Her questions were answered to their satisfaction and She agrees to proceed with the procedure.    -The patient will have the mass excisionally biopsied with local anesthesia. -The patient will not require pre-op clearance from their PCP. Surgical Plan: Excisional biopsy of subcutaneous mass of left forehead    Photos obtained. Chaperone present for entire visit. Follow up day of procedure. This document is generated, in part, by voice recognition software and thus  syntax and grammatical errors are possible.     Linn Stoll  10:16 AM  9/24/2020

## 2020-09-28 ENCOUNTER — TELEPHONE (OUTPATIENT)
Dept: SURGERY | Age: 60
End: 2020-09-28

## 2020-09-28 NOTE — TELEPHONE ENCOUNTER
Procedure: excisional bipsy of subcutaneous mass of forehead  11/5/2020 office. If taking Fish Oil, Vitamins, two weeks prior to surgery stop taking. If taking NSAIDS (such as Aspirin, Ibuprofen)  anticoagulants please consult with your prescribing physician to get further instructions on when to stop medication prior to surgery that is scheduled, patient understood.     Pre-Auth #:  CPT Codes:  ICD-10 Codes:

## 2020-10-02 ENCOUNTER — HOSPITAL ENCOUNTER (OUTPATIENT)
Dept: CT IMAGING | Age: 60
Discharge: HOME | End: 2020-10-02
Payer: MEDICARE

## 2020-10-02 PROCEDURE — 70450 CT HEAD/BRAIN W/O DYE: CPT

## 2020-11-05 ENCOUNTER — PROCEDURE VISIT (OUTPATIENT)
Dept: SURGERY | Age: 60
End: 2020-11-05
Payer: MEDICARE

## 2020-11-05 VITALS
OXYGEN SATURATION: 95 % | WEIGHT: 275 LBS | DIASTOLIC BLOOD PRESSURE: 76 MMHG | TEMPERATURE: 98 F | BODY MASS INDEX: 44.2 KG/M2 | SYSTOLIC BLOOD PRESSURE: 128 MMHG | HEIGHT: 66 IN | HEART RATE: 80 BPM

## 2020-11-05 DIAGNOSIS — R22.9 MASS OF SKIN: ICD-10-CM

## 2020-11-05 PROCEDURE — 12051 INTMD RPR FACE/MM 2.5 CM/<: CPT | Performed by: PLASTIC SURGERY

## 2020-11-05 PROCEDURE — 11442 EXC FACE-MM B9+MARG 1.1-2 CM: CPT | Performed by: PLASTIC SURGERY

## 2020-11-05 RX ORDER — LIDOCAINE HYDROCHLORIDE AND EPINEPHRINE 10; 10 MG/ML; UG/ML
3 INJECTION, SOLUTION INFILTRATION; PERINEURAL ONCE
Status: COMPLETED | OUTPATIENT
Start: 2020-11-05 | End: 2020-11-05

## 2020-11-05 RX ADMIN — LIDOCAINE HYDROCHLORIDE AND EPINEPHRINE 3 ML: 10; 10 INJECTION, SOLUTION INFILTRATION; PERINEURAL at 12:55

## 2020-11-05 NOTE — PROGRESS NOTES
Subjective: Follow up today for excisional biopsy of left forehead subcutaneous mass. Denies fever, nausea, vomiting, leg pain or swelling. The patient voices understanding of the procedure they are having today and would like to proceed. Objective:    /76 (Site: Left Upper Arm, Position: Sitting, Cuff Size: Large Adult)   Pulse 80   Temp 98 °F (36.7 °C) (Temporal)   Ht 5' 6\" (1.676 m)   Wt 275 lb (124.7 kg)   SpO2 95%   BMI 44.39 kg/m²       Left forehead subcutaneous mass  Lesion- 10mm x 10mm  Margin- 2mm  Defect- 12mm x 8mm  Scar-12mm         Assessment:    Patient Active Problem List   Diagnosis    Lumbar post-laminectomy syndrome    Neural foraminal stenosis of lumbar spine    Lumbar radiculopathy left greater than right    Diabetes mellitus (HCC)    Peripheral neuropathy    Protruded lumbar disc    DDD (degenerative disc disease), lumbar    Facet syndrome, lumbar    Trigger ring finger of right hand    Trigger middle finger of left hand    Sacroiliitis (HCC) bilateral    Epigastric pain    Acute meniscal tear of knee    Primary osteoarthritis of left knee       Plan:       Diagnosis  -) Left forehead subcutaneous mass      -The risks, benefits and options were discussed with the pt. The risks included but not limited to pain, bleeding, infection, heavy scarring, damage to surrounding structures, fluid collections, asymmetry, and need for further procedures. All of Her questions were answered to their satisfaction and She agrees to proceed with the procedure.      -After consent was obtained, the area was cleansed with an alcohol swab. Local anesthesia consisting of 1% lidocaine with 1:100,000 epinepherine was injected  surrounding the area. The local was allowed to work. Using a 10-blade the Left forehead subcutaneous mass was excised,  Intermediate  repair was performed.   The wound(s) were closed with 5-0 Monocryl and 5-0 fast absorbing gut suture , hemostasis was obtained

## 2020-11-10 NOTE — PROGRESS NOTES
[facility]   [address]            [address]                                                     [address], [address], [address], [address] [place] [address]               FINAL SURGICAL PATHOLOGY REPORT     NAME:            MARGE MORRIS       Date of       11/05/2020                                            Collection:   Medical Record   [id number]            Date of       11/06/2020   Number:                                  Receipt:   Age:  58 Y        Sex:  F                Date          11/10/2020 10:18                                            Reported:   Date Of Birth:   1960   Financial        [id number]            Admitting     KARY SALDIVAR   Number:                                  Physician:   Patient          HEVIANNEY                   Ordering      KARY SALDIVAR   Location:                                Physician:     Accession Number:  [id number]                                          Additional Physicians:FRANCISCO KENNEDY       Diagnosis:   Soft tissue, forehead: Fragment of benign adipose tissue and skeletal   muscle.                                              SHREYAS Arvizu                                   (Electronic Signature)     Plan:     Educated the pt on their pathology report. Pt voices understanding      Dressing -none  Showering at this time -OK    Pt was instructed on scar massage  Scar Care Instructions      Sunscreen Recommendations for Scars   We recommend that all patients use a sunscreen when outside but especially on new scars (that are exposed to sunlight) for a year after their procedure.  The SPF should be at least 28. Follow the application directions on the bottle. Why is it so Important to Use Sunscreen on Scars?  A scar is new and more fragile than the surrounding skin.     If you do not use sunscreen, the scar line will react differently to the sun than the surrounding skin.  If you don't use sunscreen, the scar tissue will become darker than surrounding skin. This is a hyper-pigmented scar and will remain darker than the other skin.  After one year, the scar and surrounding skin should react equally to sun. Superficial Scar Massage   Scar massage desensitizes and reduces scar adhesions so skin glides freely.  Rub in a circular motion on and around the scar with firm, even pressure for 5 minutes four times per day    You can start scar massage once incision is completely healed and strong enough to handle the motion (usually 10 - 14 days post operatively).  You use lotion to do the scar massage to allow ease with motion over the scar and prevent friction at the area. Patient had no further questions. F/U PRN  Call office with concerns or signs of infection.     Basil Penaloza

## 2020-11-13 ENCOUNTER — OFFICE VISIT (OUTPATIENT)
Dept: SURGERY | Age: 60
End: 2020-11-13

## 2020-11-13 PROCEDURE — 99024 POSTOP FOLLOW-UP VISIT: CPT | Performed by: PHYSICIAN ASSISTANT

## 2020-12-09 ENCOUNTER — HOSPITAL ENCOUNTER (OUTPATIENT)
Age: 60
Discharge: HOME | End: 2020-12-11

## 2020-12-09 PROCEDURE — 88304 TISSUE EXAM BY PATHOLOGIST: CPT

## 2022-04-19 ENCOUNTER — HOSPITAL ENCOUNTER (OUTPATIENT)
Age: 62
Discharge: HOME | End: 2022-04-21

## 2022-04-19 PROCEDURE — 88307 TISSUE EXAM BY PATHOLOGIST: CPT

## 2022-04-19 PROCEDURE — 88304 TISSUE EXAM BY PATHOLOGIST: CPT

## 2022-07-13 DIAGNOSIS — M25.561 RIGHT KNEE PAIN, UNSPECIFIED CHRONICITY: Primary | ICD-10-CM

## 2022-07-15 ENCOUNTER — OFFICE VISIT (OUTPATIENT)
Dept: ORTHOPEDIC SURGERY | Age: 62
End: 2022-07-15
Payer: MEDICARE

## 2022-07-15 VITALS — BODY MASS INDEX: 44.2 KG/M2 | HEIGHT: 66 IN | WEIGHT: 275 LBS

## 2022-07-15 DIAGNOSIS — M54.31 SCIATICA OF RIGHT SIDE: ICD-10-CM

## 2022-07-15 DIAGNOSIS — M22.2X1 PATELLOFEMORAL DISORDER OF RIGHT KNEE: ICD-10-CM

## 2022-07-15 DIAGNOSIS — S83.206A TEAR OF MENISCUS OF RIGHT KNEE, UNSPECIFIED MENISCUS, UNSPECIFIED TEAR TYPE, UNSPECIFIED WHETHER OLD OR CURRENT TEAR: Primary | ICD-10-CM

## 2022-07-15 PROCEDURE — 99204 OFFICE O/P NEW MOD 45 MIN: CPT | Performed by: ORTHOPAEDIC SURGERY

## 2022-07-15 RX ORDER — CELECOXIB 200 MG/1
200 CAPSULE ORAL 2 TIMES DAILY
COMMUNITY
Start: 2022-06-27

## 2022-07-15 RX ORDER — GLIPIZIDE 10 MG/1
10 TABLET ORAL
COMMUNITY

## 2022-07-15 RX ORDER — HYDROCODONE BITARTRATE AND IBUPROFEN 7.5; 2 MG/1; MG/1
TABLET, FILM COATED ORAL
COMMUNITY
Start: 2022-07-06

## 2022-07-15 RX ORDER — MORPHINE SULFATE 30 MG/1
30 TABLET, FILM COATED, EXTENDED RELEASE ORAL 2 TIMES DAILY
COMMUNITY
Start: 2022-07-06

## 2022-07-15 NOTE — PROGRESS NOTES
Chief Complaint   Patient presents with    Knee Pain     Right knee pain for about 3 weeks. Complains lateral knee pain. Had MRI. HPI:    Patient is 60 y.o. female complaining chronic, atraumatic, insidious onset right knee pain for 3 weeks. She admits to stiffness, deep, aching pain, swelling, difficulty with stairs and ambulating far distances. She admits to gross instability specifically in her right knee. Previous treatments include rest, ice, and anti-inflammatory medication and HEP without much relief. She also states that she has quite lots of pain shooting laterally from upper thigh through lateral knee into foot. ROS:    Skin: (-) rash,(-) psoriasis,(-) eczema, (-)skin cancer. Neurologic: (-)numbness, (-)tingling, (-)headaches, (-) LOC. Cardiovascular: (-) Chest pain, (-) swelling in legs/feet, (-) SOB, (-) cramping in legs/feet with walking.     All other review of systems negative except stated above or in HPI      Past Medical History:   Diagnosis Date    Arthritis     Asthma     Diabetes mellitus (HCC)     GERD (gastroesophageal reflux disease)     History of blood transfusion     after daughter 28 yrs ago     Hyperlipidemia     Kidney stones     Neuropathy     Pleurisy     Pneumonia     Sleep apnea     no cpap     Past Surgical History:   Procedure Laterality Date    ANESTHESIA NERVE BLOCK Left 5/15/2019    LEFT LUMBAR TRANSFORAMINAL NERVE BLOCK L3-4 L4-5 WITH IV SEDATION (CPT 59822) performed by Sherine Gill DO at [facility] Left 7/24/2019    TRANSFORAMINAL LUMBAR LEFT AT L3-4 AND L4-5 WITH IV SEDATION performed by Sherine Gill DO at [facility]  08/01/2012    L3,4,5    BREAST SURGERY      right lumpectomy benign    CHOLECYSTECTOMY, LAPAROSCOPIC      DILATATION, ESOPHAGUS      HYSTERECTOMY      KNEE ARTHROSCOPY Left 09/20/2016    medial menisectomy, synovectomy, chondroplasty    NERVE BLOCK Left 2/19/2014 left lumbar transforaminal nerve block under x-ray with sedation L3-4, L4-5  #1    NERVE BLOCK Left 2/26/14    tranfsoramihnla #2 w sedation    NERVE BLOCK  04/30/14    internal lumbar facet block #1 with sedation L3-4, L4-5, L5-S1    NERVE BLOCK  05/07/14    bilateral facet block L3-4, L4-5, L5-S1 with IV sedation #2    NERVE BLOCK Bilateral 5 14 14    lumb facet with sedation #3    NERVE BLOCK Bilateral 2 18 15    si inj #1 with anesthesia    NERVE BLOCK Bilateral 2 25 15    si inj #2 with iv sedation    NERVE BLOCK Left 03/28/2018    lumbar transforaminal block #1 with sedation    NERVE BLOCK Left 04/18/2018    lumbar transforaminal nerve block under sedation #2    NERVE BLOCK Right 04/25/2018    lumbar transforaminal #1    NERVE BLOCK Right 05/02/2018    right lumbar transforaminal nerve block under xray #2 L3-5    NERVE BLOCK Left 05/15/2019    left lumbar NB with IV sedation     NERVE BLOCK Left 07/24/2019    left lumbar transforaminal block at L3-5 with IV sedation    OTHER SURGICAL HISTORY Left 3/5/2014    left lumbar transforaminal nerve block  under x-ray with sedation  #3 L3-4, L4-5    OTHER SURGICAL HISTORY Left 6/18/2014    left lumbar radiofrequency L3-4, L4-5, L5-S!    OTHER SURGICAL HISTORY Right 7/28/14    L3-S1 radiofrequency    NV ALTAGRACIA NOSE/CLEFT LIP/TIP Left 3/28/2018    LEFT LUMBAR TRANSFORMANIAL NERVE BLOCK UNDER XRAY #1 L3-4 L4-5 WITH IV SEDATION performed by Jeremy Martin, DO at [address] NOSE/CLEFT LIP/TIP Left 4/18/2018    LEFT LUMBAR TRANSFORMANIAL NERVE BLOCK UNDER XRAY #2 L3-4 L4-5 WITH IV SEDATION performed by Jeremy Martin, DO at [address] NOSE/CLEFT LIP/TIP Right 4/25/2018    RIGHT LUMBAR TRANSFORMANIAL NERVE BLOCK UNDER XRAY #1 L3-4 L4-5 WITH IV SEDATION performed by Jeremy Martin, DO at [address] NOSE/CLEFT LIP/TIP Right 5/2/2018    RIGHT LUMBAR TRANSFORMANIAL NERVE BLOCK UNDER XRAY #2 L3-4 L4-5 WITH IV SEDATION performed by Vannesa Nath DO at [facility]      right by Dr. Adame Miss Bilateral 10/02/14    TUBAL LIGATION         Current Outpatient Medications:     glipiZIDE (GLUCOTROL) 10 MG tablet, Take 10 mg by mouth in the morning and 10 mg in the evening. Take before meals. , Disp: , Rfl:     celecoxib (CELEBREX) 200 MG capsule, , Disp: , Rfl:     HYDROcodone-ibuprofen (VICOPROFEN) 7.5-200 MG per tablet, , Disp: , Rfl:     morphine (MS CONTIN) 30 MG extended release tablet, , Disp: , Rfl:     SITagliptin (JANUVIA) 100 MG tablet, Januvia 100 mg tablet, Disp: , Rfl:     metFORMIN (GLUCOPHAGE) 1000 MG tablet, , Disp: , Rfl:     vitamin B-12 (CYANOCOBALAMIN) 1000 MCG tablet, Take 1,000 mcg by mouth daily, Disp: , Rfl:     Exenatide (BYDUREON) 2 MG PEN, Inject into the skin, Disp: , Rfl:     cetirizine (ZYRTEC ALLERGY) 10 MG tablet, Take 10 mg by mouth daily, Disp: , Rfl:     vitamin C (ASCORBIC ACID) 500 MG tablet, Take 1,000 mg by mouth daily, Disp: , Rfl:     calcium carbonate 600 MG TABS tablet, Take 1 tablet by mouth daily, Disp: , Rfl:     Multiple Vitamins-Minerals (CENTRUM SILVER PO), Take by mouth daily, Disp: , Rfl:     Cholecalciferol (VITAMIN D PO), Take 1,000 mg by mouth daily , Disp: , Rfl:     metFORMIN (GLUCOPHAGE) 500 MG tablet, Take 500 mg by mouth 3 times daily , Disp: , Rfl:     omeprazole (PRILOSEC) 40 MG delayed release capsule, Take 20 mg by mouth daily , Disp: , Rfl:     simvastatin (ZOCOR) 40 MG tablet, Take 40 mg by mouth nightly, Disp: , Rfl:     hydroxychloroquine (PLAQUENIL) 200 MG tablet, Take by mouth 2 times daily , Disp: , Rfl:     ezetimibe (ZETIA) 10 MG tablet, Take 10 mg by mouth nightly , Disp: , Rfl:     gemfibrozil (LOPID) 600 MG tablet, Take 600 mg by mouth 2 times daily (before meals). , Disp: , Rfl:   Allergies   Allergen Reactions    Latex Rash     sensitive     Social History     Socioeconomic History    Marital status:      Spouse name: Not on file    Number of children: 2    Years of education: Not on file    Highest education level: Not on file   Occupational History    Occupation:      Comment: [employer]   Tobacco Use    Smoking status: Former     Years: 10.00     Types: Cigarettes     Quit date: 2015     Years since quittin.0    Smokeless tobacco: Never   Vaping Use    Vaping Use: Never used   Substance and Sexual Activity    Alcohol use: No    Drug use: No    Sexual activity: Yes     Partners: Male   Other Topics Concern    Not on file   Social History Narrative    Not on file     Social Determinants of Health     Financial Resource Strain: Not on file   Food Insecurity: Not on file   Transportation Needs: Not on file   Physical Activity: Not on file   Stress: Not on file   Social Connections: Not on file   Intimate Partner Violence: Not on file   Housing Stability: Not on file     Family History   Problem Relation Age of Onset    Diabetes Mother     Hypertension Mother     Heart Disease Mother         father, brother, materanl grandmother    Heart Failure Mother     Cancer Mother         skin cancer     Hypertension Father     Heart Disease Father     Heart Failure Father     Heart Disease Brother     No Known Problems Other     Cancer Sister         skin cancer uknown type     Cancer Brother         lung,colon,skin cancer            Physical Exam:    Ht 5' 6\" (1.676 m)   Wt 275 lb (124.7 kg)   BMI 44.39 kg/m²     GENERAL: alert, appears stated age, cooperative, no acute distress    HEENT: Head is normocephalic, atraumatic. PERRLA. SKIN: Clean, dry, intact. There is not any cellulitis or cutaneous lesions noted in the lower extremities except noted in MSK    PULMONARY: breathing is regular and unlabored, no acute distress    CV: The bilateral upper and lower extremities are warm and well-perfused with brisk capillary refill.  2+ pulses UE and LE bilateral.     ABDOMINAL: Non-tender, non-distended    PSYCHIATRY: Pleasant mood, appropriate behavior, follows commands    NEURO: Sensation is intact distally with light touch with no alteration. Motor exam of the lower extremities show quadriceps, hamstrings, foot dorsiflexion and plantarflexion grossly intact 5/5. LYMPH: No lymphedema present distally in upper or lower extremity. MUSCULOSKELETAL:    Right Knee Exam:    mild effusion noted. No erythema/induration/fluctuance. Posterior medial joint line TTP. Stable to varus and valgus at 0 and 30 degrees of flexion. Negative Lachman's and posterior drawer. Negative patellar grind test and J sign. Compartments soft and compressible throughout leg. Active range of motion 0-120 with pain. Positive Julius's positive Apley's, gait is antalgic        Imaging:  DATE OF EXAM: Jul 9 2022 10:32AM       [facility]   0213  -  MRI KNEE WO IVCON RT  / ACCESSION #  [id number]     PROCEDURE REASON: RT KNEE PAIN          * * * * Physician Interpretation * * * *         RESULT: MRI KNEE WO IVCON RT     HISTORY: PAIN POSTERIOR LATERAL ASPECT NO KNOWN INJURY     TECHNIQUE:  Multiplanar, multisequence MRI was performed utilizing the   following sequences: Sagittal fat sat T2, PD, and 3D gradient. Coronal   T1 and fat sat PD. Axial T2.         COMPARISON: None available. RESULT:   There is an incomplete radial tear at the posterior horn medial meniscus   (8:16). The lateral meniscus has normal morphology and signal without tear   identified. Mild thickening of the ACL and PCL with increased intermediate   intrasubstance signal.  Findings are consistent with mucinous   degeneration. Mild thickening of the proximal fibular collateral ligament and MCL   attachments. Collateral ligaments are otherwise intact. Extensor   mechanism is intact. .     There is subchondral cyst formation involving the trochlear groove and   lateral patella. The patella is normally aligned with the trochlea. CARTILAGE:     Medial Femoral Condyle: Small areas(s) of cartilage signal abnormality   with smaller area(s) of low grade (<50% thickness) cartilage   loss/fissuring . Medial Tibial Plateau: Normal .     Lateral Femoral Condyle: Normal .     Lateral Tibial Plateau: Small areas(s) of cartilage signal abnormality   with smaller area(s) of low grade (<50% thickness) cartilage   loss/fissuring . Patella: Large area(s) of cartilage signal abnormality with smaller   area(s) of low grade (<50% thickness) cartilage  loss/fissuring     Trochlea: Moderate sized area(s) of cartilage signal abnormality with   smaller area(s) of high grade (>50% thickness) cartilage loss/fissuring . No joint effusion. No synovitis. No Baker's cyst.  Note is made of a   high origin of the anterior tibial artery which courses deep/anterior to   the popliteus muscle. .     Suprapatellar and infrapatellar fat pads are within normal limits. There   is no plica thickening identified. IMPRESSION - MRI KNEE WO IVCON RT:     1. Incomplete radial tear at the posterior horn medial meniscus. 2.  Degenerative changes with patellofemoral predominance      Nick Mcfadden was seen today for knee pain. Diagnoses and all orders for this visit:    Tear of meniscus of right knee, unspecified meniscus, unspecified tear type, unspecified whether old or current tear    Patellofemoral disorder of right knee    Sciatica of right side      Patient seen and examined. X-rays reviewed. Patient has little to no arthritis noted on x-rays today. However patient's main complaint is instability of symptomatic knee. Exam and history is consistent with possible meniscus tear. MRI recommended for further evaluation management. Follow-up after MRI    Patient seen and examined. MRI reviewed with patient in detail.   Natural history and course discussed with patient in long discussion  Treatment options discussed with patient in detail including risks and benefits. Patient should do well with conservative management as patient would like to avoid surgery at this time. Patient was counseled about risks and benefits of knee arthroscopy but appears that another source of her pain could be related to her back and sciatica issues. Patient does follow with a back specialist.  She is following with them shortly and she was instructed to have her sciatica reevaluated as this may be adding to her symptoms. Patient was offered cortisone injection today but she is declining. Patient should do well first with a prescription of Nayana pain cream.      The risks and benefits of a knee arthroscopy were discussed with the patient. The risks are including but not limited to: infection, injuries to blood vessels and nerves, non relief of symptoms, arthrofibrosis of knee, need for further operative intervention, blood loss, PE/DVT, MI and death. Patient verbalized understanding of the discussed procedure along with risks and benefits. The patient was counseled at length about the risks of sienna Covid-19 during their perioperative period and any recovery window from their procedure. The patient was made aware that sienna Covid-19  may worsen their prognosis for recovering from their procedure  and lend to a higher morbidity and/or mortality risk. All material risks, benefits, and reasonable alternatives including postponing the procedure were discussed. Carol Weston DO  7/15/22              45 minutes was spent with patient. 50% or greater was spent counseling the patient.

## 2022-08-19 ENCOUNTER — OFFICE VISIT (OUTPATIENT)
Dept: ORTHOPEDIC SURGERY | Age: 62
End: 2022-08-19
Payer: MEDICARE

## 2022-08-19 VITALS — BODY MASS INDEX: 45.66 KG/M2 | WEIGHT: 284.1 LBS | HEIGHT: 66 IN

## 2022-08-19 DIAGNOSIS — M22.2X1 PATELLOFEMORAL DISORDER OF RIGHT KNEE: Primary | ICD-10-CM

## 2022-08-19 PROCEDURE — 99214 OFFICE O/P EST MOD 30 MIN: CPT | Performed by: ORTHOPAEDIC SURGERY

## 2022-08-19 RX ORDER — HYDROCODONE BITARTRATE AND ACETAMINOPHEN 7.5; 325 MG/1; MG/1
1 TABLET ORAL DAILY
COMMUNITY
Start: 2022-08-03

## 2022-08-19 NOTE — PROGRESS NOTES
Chief Complaint   Patient presents with    Follow-up    Knee Pain     Right knee pain from torn meniscus. Still hurting and states now her left knee is hurting d/t favoring the right knee. HPI:    Patient is 60 y.o. female complaining chronic, atraumatic, insidious onset right knee pain for 3 weeks. She admits to stiffness, deep, aching pain, swelling, difficulty with stairs and ambulating far distances. She admits to gross instability specifically in her right knee. Previous treatments include rest, ice, and anti-inflammatory medication and HEP without much relief. She also states that she has quite lots of pain shooting laterally from upper thigh through lateral knee into foot. She has not had MRI of her back performed yet. ROS:    Skin: (-) rash,(-) psoriasis,(-) eczema, (-)skin cancer. Neurologic: (-)numbness, (-)tingling, (-)headaches, (-) LOC. Cardiovascular: (-) Chest pain, (-) swelling in legs/feet, (-) SOB, (-) cramping in legs/feet with walking.     All other review of systems negative except stated above or in HPI      Past Medical History:   Diagnosis Date    Arthritis     Asthma     Diabetes mellitus (HCC)     GERD (gastroesophageal reflux disease)     History of blood transfusion     after daughter 28 yrs ago     Hyperlipidemia     Kidney stones     Neuropathy     Pleurisy     Pneumonia     Sleep apnea     no cpap     Past Surgical History:   Procedure Laterality Date    ANESTHESIA NERVE BLOCK Left 5/15/2019    LEFT LUMBAR TRANSFORAMINAL NERVE BLOCK L3-4 L4-5 WITH IV SEDATION (CPT 89642) performed by Kp Cha DO at [facility] 7/24/2019    TRANSFORAMINAL LUMBAR LEFT AT L3-4 AND L4-5 WITH IV SEDATION performed by Kp Cha DO at [facility]  08/01/2012    L3,4,5    BREAST SURGERY      right lumpectomy benign    CHOLECYSTECTOMY, LAPAROSCOPIC      DILATATION, ESOPHAGUS      HYSTERECTOMY      KNEE ARTHROSCOPY Left 09/20/2016    medial menisectomy, synovectomy, chondroplasty    NERVE BLOCK Left 2/19/2014    left lumbar transforaminal nerve block under x-ray with sedation L3-4, L4-5  #1    NERVE BLOCK Left 2/26/14    tranfsoramihnla #2 w sedation    NERVE BLOCK  04/30/14    internal lumbar facet block #1 with sedation L3-4, L4-5, L5-S1    NERVE BLOCK  05/07/14    bilateral facet block L3-4, L4-5, L5-S1 with IV sedation #2    NERVE BLOCK Bilateral 5 14 14    lumb facet with sedation #3    NERVE BLOCK Bilateral 2 18 15    si inj #1 with anesthesia    NERVE BLOCK Bilateral 2 25 15    si inj #2 with iv sedation    NERVE BLOCK Left 03/28/2018    lumbar transforaminal block #1 with sedation    NERVE BLOCK Left 04/18/2018    lumbar transforaminal nerve block under sedation #2    NERVE BLOCK Right 04/25/2018    lumbar transforaminal #1    NERVE BLOCK Right 05/02/2018    right lumbar transforaminal nerve block under xray #2 L3-5    NERVE BLOCK Left 05/15/2019    left lumbar NB with IV sedation     NERVE BLOCK Left 07/24/2019    left lumbar transforaminal block at L3-5 with IV sedation    OTHER SURGICAL HISTORY Left 3/5/2014    left lumbar transforaminal nerve block  under x-ray with sedation  #3 L3-4, L4-5    OTHER SURGICAL HISTORY Left 6/18/2014    left lumbar radiofrequency L3-4, L4-5, L5-S!    OTHER SURGICAL HISTORY Right 7/28/14    L3-S1 radiofrequency    OR ALTAGRACIA NOSE/CLEFT LIP/TIP Left 3/28/2018    LEFT LUMBAR TRANSFORMANIAL NERVE BLOCK UNDER XRAY #1 L3-4 L4-5 WITH IV SEDATION performed by Sunil Hanna DO at [address] NOSE/CLEFT LIP/TIP Left 4/18/2018    LEFT LUMBAR TRANSFORMANIAL NERVE BLOCK UNDER XRAY #2 L3-4 L4-5 WITH IV SEDATION performed by Sunil Hanna DO at [address] NOSE/CLEFT LIP/TIP Right 4/25/2018    RIGHT LUMBAR TRANSFORMANIAL NERVE BLOCK UNDER XRAY #1 L3-4 L4-5 WITH IV SEDATION performed by Sunil Hanna DO at [address] NOSE/CLEFT LIP/TIP Right 5/2/2018    RIGHT LUMBAR TRANSFORMANIAL NERVE BLOCK UNDER XRAY #2 L3-4 L4-5 WITH IV SEDATION performed by Belkys Patricia DO at [facility]      right by Dr. Tim Wall Bilateral 10/02/14    TUBAL LIGATION         Current Outpatient Medications:     celecoxib (CELEBREX) 200 MG capsule, , Disp: , Rfl:     HYDROcodone-ibuprofen (VICOPROFEN) 7.5-200 MG per tablet, , Disp: , Rfl:     morphine (MS CONTIN) 30 MG extended release tablet, , Disp: , Rfl:     SITagliptin (JANUVIA) 100 MG tablet, Januvia 100 mg tablet, Disp: , Rfl:     metFORMIN (GLUCOPHAGE) 1000 MG tablet, , Disp: , Rfl:     glipiZIDE (GLUCOTROL) 10 MG tablet, Take 10 mg by mouth in the morning and 10 mg in the evening. Take before meals. , Disp: , Rfl:     vitamin B-12 (CYANOCOBALAMIN) 1000 MCG tablet, Take 1,000 mcg by mouth daily, Disp: , Rfl:     Exenatide (BYDUREON) 2 MG PEN, Inject into the skin, Disp: , Rfl:     cetirizine (ZYRTEC ALLERGY) 10 MG tablet, Take 10 mg by mouth daily, Disp: , Rfl:     vitamin C (ASCORBIC ACID) 500 MG tablet, Take 1,000 mg by mouth daily, Disp: , Rfl:     calcium carbonate 600 MG TABS tablet, Take 1 tablet by mouth daily, Disp: , Rfl:     Multiple Vitamins-Minerals (CENTRUM SILVER PO), Take by mouth daily, Disp: , Rfl:     Cholecalciferol (VITAMIN D PO), Take 1,000 mg by mouth daily , Disp: , Rfl:     metFORMIN (GLUCOPHAGE) 500 MG tablet, Take 500 mg by mouth 3 times daily , Disp: , Rfl:     omeprazole (PRILOSEC) 40 MG delayed release capsule, Take 20 mg by mouth daily , Disp: , Rfl:     simvastatin (ZOCOR) 40 MG tablet, Take 40 mg by mouth nightly, Disp: , Rfl:     hydroxychloroquine (PLAQUENIL) 200 MG tablet, Take by mouth 2 times daily , Disp: , Rfl:     ezetimibe (ZETIA) 10 MG tablet, Take 10 mg by mouth nightly , Disp: , Rfl:     gemfibrozil (LOPID) 600 MG tablet, Take 600 mg by mouth 2 times daily (before meals). , Disp: , Rfl:   Allergies Allergen Reactions    Latex Rash     sensitive     Social History     Socioeconomic History    Marital status:      Spouse name: Not on file    Number of children: 2    Years of education: Not on file    Highest education level: Not on file   Occupational History    Occupation:      Comment: [address]   Tobacco Use    Smoking status: Former     Years: 10.00     Types: Cigarettes     Quit date: 2015     Years since quittin.1    Smokeless tobacco: Never   Vaping Use    Vaping Use: Never used   Substance and Sexual Activity    Alcohol use: No    Drug use: No    Sexual activity: Yes     Partners: Male   Other Topics Concern    Not on file   Social History Narrative    Not on file     Social Determinants of Health     Financial Resource Strain: Not on file   Food Insecurity: Not on file   Transportation Needs: Not on file   Physical Activity: Not on file   Stress: Not on file   Social Connections: Not on file   Intimate Partner Violence: Not on file   Housing Stability: Not on file     Family History   Problem Relation Age of Onset    Diabetes Mother     Hypertension Mother     Heart Disease Mother         father, brother, materanl grandmother    Heart Failure Mother     Cancer Mother         skin cancer     Hypertension Father     Heart Disease Father     Heart Failure Father     Heart Disease Brother     No Known Problems Other     Cancer Sister         skin cancer uknown type     Cancer Brother         lung,colon,skin cancer            Physical Exam:    Ht 5' 6\" (1.676 m)   Wt 284 lb 1.6 oz (128.9 kg)   BMI 45.85 kg/m²     GENERAL: alert, appears stated age, cooperative, no acute distress    HEENT: Head is normocephalic, atraumatic. PERRLA. SKIN: Clean, dry, intact. There is not any cellulitis or cutaneous lesions noted in the lower extremities     PULMONARY: breathing is regular and unlabored, no acute distress     CV:  The bilateral lower extremities are warm and well-perfused with brisk capillary refill. 2+ pulses LE bilateral.     ABDOMINAL: Nontender, nondistended     PSYCHIATRY: Pleasant mood, appropriate behavior, follows commands     NEURO: Sensation is intact distally with light touch with no alteration. Motor exam of the lower extremities show quadriceps, hamstrings, foot dorsiflexion and plantarflexion grossly intact 5/5. LYMPH: No lymphedema present distally in upper or lower extremity. MUSCULOSKELETAL:    Right Knee Exam:    mild effusion noted. No erythema/induration/fluctuance. Posterior medial joint line TTP. Stable to varus and valgus at 0 and 30 degrees of flexion. Negative Lachman's and posterior drawer. Negative patellar grind test and J sign. Compartments soft and compressible throughout leg. Active range of motion 0-120 with pain. Positive Julius's positive Apley's, gait is antalgic        Imaging:  DATE OF EXAM: Jul 9 2022 10:32AM       [facility]   0213  -  MRI KNEE WO IVCON RT  / ACCESSION #  [id number]     PROCEDURE REASON: RT KNEE PAIN          * * * * Physician Interpretation * * * *         RESULT: MRI KNEE WO IVCON RT     HISTORY: PAIN POSTERIOR LATERAL ASPECT NO KNOWN INJURY     TECHNIQUE:  Multiplanar, multisequence MRI was performed utilizing the   following sequences: Sagittal fat sat T2, PD, and 3D gradient. Coronal   T1 and fat sat PD. Axial T2.         COMPARISON: None available. RESULT:   There is an incomplete radial tear at the posterior horn medial meniscus   (8:16). The lateral meniscus has normal morphology and signal without tear   identified. Mild thickening of the ACL and PCL with increased intermediate   intrasubstance signal.  Findings are consistent with mucinous   degeneration. Mild thickening of the proximal fibular collateral ligament and MCL   attachments. Collateral ligaments are otherwise intact. Extensor   mechanism is intact. .     There is subchondral cyst formation involving the trochlear groove and lateral patella. The patella is normally aligned with the trochlea. CARTILAGE:     Medial Femoral Condyle: Small areas(s) of cartilage signal abnormality   with smaller area(s) of low grade (<50% thickness) cartilage   loss/fissuring . Medial Tibial Plateau: Normal .     Lateral Femoral Condyle: Normal .     Lateral Tibial Plateau: Small areas(s) of cartilage signal abnormality   with smaller area(s) of low grade (<50% thickness) cartilage   loss/fissuring . Patella: Large area(s) of cartilage signal abnormality with smaller   area(s) of low grade (<50% thickness) cartilage  loss/fissuring     Trochlea: Moderate sized area(s) of cartilage signal abnormality with   smaller area(s) of high grade (>50% thickness) cartilage loss/fissuring . No joint effusion. No synovitis. No Baker's cyst.  Note is made of a   high origin of the anterior tibial artery which courses deep/anterior to   the popliteus muscle. .     Suprapatellar and infrapatellar fat pads are within normal limits. There   is no plica thickening identified. IMPRESSION - MRI KNEE WO IVCON RT:     1. Incomplete radial tear at the posterior horn medial meniscus. 2.  Degenerative changes with patellofemoral predominance      There are no diagnoses linked to this encounter. Patient seen and examined. X-rays reviewed. Patient has little to no arthritis noted on x-rays today. However patient's main complaint is instability of symptomatic knee. Exam and history is consistent with possible meniscus tear. MRI recommended for further evaluation management. Follow-up after MRI    Patient seen and examined. MRI reviewed with patient in detail. Natural history and course discussed with patient in long discussion  Treatment options discussed with patient in detail including risks and benefits. Patient should do well with conservative management as patient would like to avoid surgery at this time.     Patient was counseled about risks and benefits of knee arthroscopy but appears that another source of her pain could be related to her back and sciatica issues. Patient does follow with a back specialist.  She is following with them shortly and she was instructed to have her sciatica reevaluated as this may be adding to her symptoms. Patient was offered cortisone injection today but she is declining. Patient should do well first with a prescription of Nayana pain cream.      The risks and benefits of a knee arthroscopy were discussed with the patient. The risks are including but not limited to: infection, injuries to blood vessels and nerves, non relief of symptoms, arthrofibrosis of knee, need for further operative intervention, blood loss, PE/DVT, MI and death. Patient verbalized understanding of the discussed procedure along with risks and benefits. The patient was counseled at length about the risks of sienna Covid-19 during their perioperative period and any recovery window from their procedure. The patient was made aware that sienna Covid-19  may worsen their prognosis for recovering from their procedure  and lend to a higher morbidity and/or mortality risk. All material risks, benefits, and reasonable alternatives including postponing the procedure were discussed. In a 15 minute assessment and discussion, patient was counseled on weight loss, healthy diet, and physical activity relating to this condition. She was educated with options in detail including nutrition, joining a health club/ weight loss program, and use of cardio equipment such as the Arc Trainer and the importance of use as well as range of motion and HEP exercises for weight loss and general health. Aleisha Wills DO            25 minutes was spent with patient. 50% or greater was spent counseling the patient.

## 2022-08-19 NOTE — PATIENT INSTRUCTIONS
Patient Education        Meniscus Tear: Care Instructions  Overview     The meniscus is rubbery tissue in the knee that acts as a shock absorber between the upper and lower leg bones. The meniscus also keeps your knee stable by spreading weight across it. Each knee has two menisci (plural of meniscus). You can tear a meniscus if you plant your foot and twist, or pivot. The meniscus also can wear down as you age, and it can tear from squatting or kneeling. Small tears may heal on their own with rest and some physical therapy. But a more serious tear may need surgery to repair it or to remove part of the meniscus. Your doctor may want you to see a doctor who specializes in bones and sports injuries. Follow-up care is a key part of your treatment and safety. Be sure to make and go to all appointments, and call your doctor if you are having problems. It's also a good idea to know your test results and keep a list of the medicines you take. How can you care for yourself at home? Rest your knee when possible. Do not squat or kneel. Take pain medicines exactly as directed. If the doctor gave you a prescription medicine for pain, take it as prescribed. If you are not taking a prescription pain medicine, ask your doctor if you can take an over-the-counter medicine. Put ice or a cold pack on your knee for 10 to 20 minutes at a time. Try to do this every 1 to 2 hours for the next 3 days (when you are awake) or until the swelling goes down. Put a thin cloth between the ice and your skin. Prop up the sore leg on a pillow when you ice your knee or any time you sit or lie down during the next 3 days. Try to keep your leg above the level of your heart. This will help reduce swelling. Follow your doctor's directions for using crutches or a knee brace, if suggested. Follow your doctor's directions for exercises to keep your knee mobile and your leg muscles strong.  Here are a few exercises you can try if your doctor says it is okay. Quad sets: Lie down on the floor or the bed with your injured leg straight. Fully extend your leg--there should be no or little bend in your knee. Tighten the thigh (quadriceps) of your injured leg for 6 seconds. Do not lift your heel up. Relax your quadriceps for 10 seconds. Repeat this exercise 8 to 12 times several times during the day. Straight-leg raises: Lie down on the floor or the bed with your injured leg flat and your uninjured leg bent so that the bottom of your foot is on the floor or bed. Tighten the quadriceps of your injured leg. Keeping your knee as straight as possible, lift your injured leg off the bed until it is about 18 inches above the bed or floor. Lower your leg back down and relax for 5 seconds. Do 3 sets of 20 repetitions, or if you tire quickly, 3 sets of 8 to 12 repetitions. Heel raises: Stand with your feet a few inches apart. Rest your hands lightly on a counter or chair in front of you. Slowly raise your heels off the floor while keeping your knees straight. Hold for 3 seconds, then slowly lower your heels to the floor. Do 3 sets of 8 to 12 repetitions. Heel slides: Lie down on the floor or the bed with your leg flat. Slowly begin to slide your heel toward your rear end (buttocks), keeping your heel on the floor. Your knee will begin to bend. Slide your heel and bend your knee until it becomes a little sore and you can feel a small amount of pressure inside your knee. Hold this position for 10 seconds. Slide your heel back down until your leg is straight on the floor. Relax for 10 seconds. Repeat this exercise 20 times. When should you call for help? Watch closely for changes in your health, and be sure to contact your doctor if:    You have increasing knee pain or swelling or both. Your knee is so sore or stiff that you cannot walk on it. You do not get better as expected. Where can you learn more? Go to https://alicia.healthmyfab5. org and sign in to your [employer] account. Enter Z470 in the [facility] box to learn more about \"Meniscus Tear: Care Instructions. \"     If you do not have an account, please click on the \"Sign Up Now\" link. Current as of: November 16, 2020               Content Version: 12.9  © 2006-2021 [employer]. Care instructions adapted under license by [facility] ([facility]). If you have questions about a medical condition or this instruction, always ask your healthcare professional. [address] any warranty or liability for your use of this information. Patient Education        Meniscus Surgery: Before Your Surgery  What is meniscus surgery? Meniscus surgery removes or fixes the cartilage between the bones in the knee. This cartilage is called the meniscus. Each knee has two of these rubbery pads of cartilage, one on either side. When a meniscus tears, your knee may be painful, swell, get stiff, or lock up. Your doctor may use small tools to remove parts of the damaged meniscus and smooth the edges. This is called a partial meniscectomy (say \"men--SEK-tu-jayant\"). In some cases, tears in the meniscus can be sewn back together. But if your meniscus can't be repaired, the doctor may remove the damaged part. Meniscus surgery is usually done as arthroscopic surgery. Your doctor uses a lighted tube called an arthroscope, or scope. He or she puts the scope and other surgical tools through small cuts in your knee. These cuts are called incisions. They leave scars that usually fade with time. The surgery will take at least 1 hour. Most people go home the same day of the surgery. You may have to use crutches after surgery. If so, be sure you have a backpack or clothes with a lot of pockets to carry items. You may be able to go back to school in 1 to 2 weeks. If you have a job and you sit at work, you may be able to go back in 1 to 2 weeks.  But if you are on your feet at work, it may take 4 to have one, you may want to prepare one. It lets others know your health care wishes. It's a good thing to have before any type of surgery or procedure. What happens on the day of surgery? Follow the instructions exactly about when to stop eating and drinking. If you don't, your surgery may be canceled. If your doctor told you to take your medicines on the day of surgery, take them with only a sip of water. Take a bath or shower before you come in for your surgery. Do not apply lotions, perfumes, deodorants, or nail polish. Do not shave the surgical site yourself. Take off all jewelry and piercings. And take out contact lenses, if you wear them. At the hospital or surgery center   Bring a picture ID. The area for surgery is often marked to make sure there are no errors. You will be kept comfortable and safe by your anesthesia provider. The anesthesia may make you sleep. Or it may just numb the area being worked on. The surgery will take at least 1 hour. Your leg may be in a leg brace to limit motion. You may need to wear a brace for 4 to 6 weeks after surgery. You may have a device that applies cold treatment to your knee. When should you call your doctor? You have questions or concerns. You don't understand how to prepare for your surgery. You become ill before the surgery (such as fever, flu, or a cold). You need to reschedule or have changed your mind about having the surgery. Where can you learn more? Go to https://tu.nr.Ensygnia. org and sign in to your [employer] account. Enter N192 in the [facility] box to learn more about \"Meniscus Surgery: Before Your Surgery. \"     If you do not have an account, please click on the \"Sign Up Now\" link. Current as of: November 16, 2020               Content Version: 12.9  © 2006-2021 Healthwise, Incorporated. Care instructions adapted under license by [facility] ([facility]).  If you have questions about a medical condition or this instruction, always ask your healthcare professional. [address] any warranty or liability for your use of this information. Patient Education        Meniscus Surgery: What to Expect at Home  Your Recovery  Meniscus surgery removes or fixes the cartilage (meniscus) between the bones in the knee. Each knee has two of these rubbery pads of cartilage, one on either side. Meniscus repair is usually done with arthroscopic surgery. Your doctor put a lighted tube--called an arthroscope or scope--and other surgical tools through small cuts (incisions) in your knee. The incisions leave scars that usually fade in time. You will feel tired for several days. Your knee will be swollen. And you may have numbness around the cuts the doctor made (incisions) on your knee. You can put ice on the knee to reduce swelling. Most of this will go away in a few days. You should soon start seeing improvement in your knee. You may be able to return to most of your regular activities within a few weeks. But it will be several months before you have complete use of your knee. It may take as long as 6 months before your knee is strong enough for hard physical work or certain sports. You will need to build your strength and the motion of your joint with rehabilitation (rehab) exercises. In time, your knee will likely be stronger and more stable than it was before the surgery. How soon you can return to sports or exercise depends on how well you follow your rehab program and how well your knee heals. Your doctor or physical therapist will give you an idea of when you can return to these activities. If you had a partial meniscectomy, you might be able to play sports in about 4 to 6 weeks. If you had meniscus repair, it may be 3 to 6 months before you can play sports. This care sheet gives you a general idea about how long it will take for you to recover.  But each person recovers at a different pace. Follow the steps below to get better as quickly as possible. How can you care for yourself at home? Activity    Rest when you feel tired. Getting enough sleep will help you recover. Sleep with your knee raised, but not bent. Put a pillow under your foot. Keep your leg raised as much as possible for the first few days. You may shower 24 to 48 hours after surgery, if your doctor okays it. When you shower, keep your bandage and incisions dry by taping a sheet of plastic to cover them. If you have a brace, take it off if your doctor says it is okay. It might help to sit on a shower stool. You will be able to stand if you have a brace or use crutches. Do not put weight on your leg until your doctor says you can. You can move around the house to do daily tasks. If you have a brace, leave it on except when you exercise your knee or you shower. Be careful not to put the brace on too tight. You will use it for about 2 to 6 weeks. If your doctor does not want you to shower or remove your brace, you can take a sponge bath. Wait 2 weeks or until your doctor says it is okay before you take a bath, swim, use a hot tub, or soak your leg. You can drive when you are no longer using crutches or a knee brace, are no longer taking prescription pain medicine, and have some control over your knee. This usually takes 1 to 2 weeks. How soon you can return to work depends on your job. If you sit at work, you may be able to go back in 1 to 2 weeks. But if you are on your feet at work, it may take 4 to 6 weeks. If you are very physically active in your job, it may take 3 to 6 months. Diet    You can eat your normal diet. If your stomach is upset, try bland, low-fat foods like plain rice, broiled chicken, toast, and yogurt. Drink plenty of fluids. You may notice that your bowel movements are not regular right after your surgery. This is common.  Try to avoid constipation and straining with when you ice it or anytime you sit or lie down. Try to keep it above the level of your heart. This will help reduce swelling. If your doctor gave you support stockings, wear them as long as he or she tells you to. These help prevent blood clots. Follow-up care is a key part of your treatment and safety. Be sure to make and go to all appointments, and call your doctor if you are having problems. It's also a good idea to know your test results and keep a list of the medicines you take. When should you call for help? Call 911 anytime you think you may need emergency care. For example, call if:    You passed out (lost consciousness). You have severe trouble breathing. You have sudden chest pain and shortness of breath, or you cough up blood. Call your doctor now or seek immediate medical care if:    You have pain that does not go away after you take pain medicine. You have loose stitches, or your incisions come open. Bright red blood has soaked through the bandage over your incision. You have signs of infection, such as: Increased pain, swelling, warmth, or redness. Red streaks leading from the incision. Pus draining from the incision. Swollen lymph nodes in your neck, armpits, or groin. A fever. You have signs of a blood clot, such as:  Pain in your calf, back of the knee, thigh, or groin. Redness and swelling in your leg or groin. Watch closely for any changes in your health, and be sure to contact your doctor if:    You feel a catching or locking in your knee. You are sick to your stomach or cannot keep fluids down. You have swelling, tingling, pain, or numbness in your toes that does not go away when you raise your knee above the level of your heart. You do not have a bowel movement after taking a laxative. Where can you learn more? Go to https://alicia.[employer]. org and sign in to your [employer] account.  Enter K767 in the [facility] box to learn more about \"Meniscus Surgery: What to Expect at Home. \"     If you do not have an account, please click on the \"Sign Up Now\" link. Current as of: November 16, 2020               Content Version: 12.9  © 2006-2021 [employer]. Care instructions adapted under license by [facility] ([facility]). If you have questions about a medical condition or this instruction, always ask your healthcare professional. [address] any warranty or liability for your use of this information. Patient Education        Knee Arthroscopy: Before Your Surgery  What is knee arthroscopy? Arthroscopy is a way to find problems and do surgery inside a joint without making a large cut (incision). Your doctor puts a lighted tube with a tiny camera and surgical tools through small incisions in your knee. The camera is called an arthroscope, or scope. In this surgery, your doctor may:  Remove or repair a torn piece of cartilage or loose bone. Replace a torn anterior cruciate ligament (ACL) with a piece of tissue. This repair is called a graft. Smooth the rough surfaces of your joint. Most people go home on the day of the surgery or the next day. If you have a simple injury, it may take at least 6 weeks to recover. It may take longer if your doctor had to repair damaged tissue. You will need to limit activity while your knee heals. You may need to have physical therapy (rehab) to help your knee get stronger. If you have a desk job, you may be able to go back to work a few days after treatment of a simple injury. If you lift things or stand or walk a lot at work, it may be 2 to 6 weeks before you can go back. After surgery and rehab, you will probably have less pain. Your knee should be stronger. You should be able to use your knee and leg better. Some people have to avoid lifting heavy objects. Follow-up care is a key part of your treatment and safety.  Be sure to make and go to all appointments, and call your doctor if you are having problems. It's also a good idea to know your test results and keep a list of the medicines you take. How do you prepare for surgery? Surgery can be stressful. This information will help you understand what you can expect. And it will help you safely prepare for surgery. Preparing for surgery    Be sure you have someone to take you home. Anesthesia and pain medicine will make it unsafe for you to drive or get home on your own. Understand exactly what surgery is planned, along with the risks, benefits, and other options. Tell your doctor ALL the medicines, vitamins, supplements, and herbal remedies you take. Some may increase the risk of problems during your surgery. Your doctor will tell you if you should stop taking any of them before the surgery and how soon to do it. If you take aspirin or some other blood thinner, ask your doctor if you should stop taking it before your surgery. Make sure that you understand exactly what your doctor wants you to do. These medicines increase the risk of bleeding. Make sure your doctor and the hospital have a copy of your advance directive. If you don't have one, you may want to prepare one. It lets others know your health care wishes. It's a good thing to have before any type of surgery or procedure. What happens on the day of surgery? Follow the instructions exactly about when to stop eating and drinking. If you don't, your surgery may be canceled. If your doctor told you to take your medicines on the day of surgery, take them with only a sip of water. Take a bath or shower before you come in for your surgery. Do not apply lotions, perfumes, deodorants, or nail polish. Do not shave the surgical site yourself. Take off all jewelry and piercings. And take out contact lenses, if you wear them. At the hospital or surgery center   Bring a picture ID.      The area for surgery is often marked to make sure there are no errors. You will be kept comfortable and safe by your anesthesia provider. The anesthesia may make you sleep. Or it may just numb the area being worked on. The surgery will take about 1 to 2 hours. It depends on how much repair needs to be done to your knee. When should you call your doctor? You have questions or concerns. You don't understand how to prepare for your surgery. You become ill before the surgery (such as fever, flu, or a cold). You need to reschedule or have changed your mind about having the surgery. Where can you learn more? Go to https://IMASTEpepiceweb.[employer]. org and sign in to your [employer] account. Enter K985 in the [employer] box to learn more about \"Knee Arthroscopy: Before Your Surgery. \"     If you do not have an account, please click on the \"Sign Up Now\" link. Current as of: November 16, 2020               Content Version: 12.9  © 2006-2021 [employer]. Care instructions adapted under license by [facility] ([facility]). If you have questions about a medical condition or this instruction, always ask your healthcare professional. [place] [address] any warranty or liability for your use of this information. Patient Education        Knee Arthroscopy: What to Expect at Home  Your Recovery     Arthroscopy is a way to find problems and do surgery inside a joint without making a large cut (incision). Your doctor put a lighted tube with a tiny camera--called an arthroscope, or scope--and surgical tools through small incisions in your knee. You will feel tired for several days. Your knee will be swollen. And you may notice that your skin is a different color near the cuts (incisions). The swelling is normal and will start to go away in a few days. Keeping your leg higher than your heart will help with swelling and pain. You will probably need about 6 weeks to recover.  If your doctor repaired damaged tissue, recovery will take longer. You may have to limit your activity until your knee strength and movement are back to normal. You may also be in a physical rehabilitation (rehab) program.  You may be able to return to a desk job or your normal routine in a few days. But if you do physical labor, it may be as long as 2 months before you can go back to work. This care sheet gives you a general idea about how long it will take for you to recover. But each person recovers at a different pace. Follow the steps below to get better as quickly as possible. How can you care for yourself at home? Activity    Rest when you feel tired. Getting enough sleep will help you recover. Use pillows to raise your ankle and leg above the level of your heart. Try to walk each day, after your doctor has said you can. Start by walking a little more than you did the day before. Bit by bit, increase the amount you walk. Walking boosts blood flow and helps prevent pneumonia and constipation. You may have a brace or crutches or both. Your doctor will tell you how often and how much you can move your leg and knee. If you have a desk job, you may be able to return to work a few days after the surgery. If you lift things or stand or walk a lot at work, it may be as long as 2 months before you can return. You can take a shower 48 to 72 hours after surgery and clean the incisions with regular soap and water. Do not take a bath or soak your knee until your doctor says it is okay. Ask your doctor when you can drive again. If you had a repair of torn tissue, follow your doctor's instructions for lifting things or moving your knee. Diet    You can eat your normal diet. If your stomach is upset, try bland, low-fat foods like plain rice, broiled chicken, toast, and yogurt. Drink plenty of fluids, unless your doctor tells you not to.      You may notice that your bowel movements are not regular right after your surgery. This is common. Try to avoid constipation and straining with bowel movements. You may want to take a fiber supplement every day. If you have not had a bowel movement after a couple of days, ask your doctor about taking a mild laxative. Medicines    Your doctor will tell you if and when you can restart your medicines. He or she will also give you instructions about taking any new medicines. If you take aspirin or some other blood thinner, ask your doctor if and when to start taking it again. Make sure that you understand exactly what your doctor wants you to do. Be safe with medicines. Take pain medicines exactly as directed. If the doctor gave you a prescription medicine for pain, take it as prescribed. If you are not taking a prescription pain medicine, ask your doctor if you can take an over-the-counter medicine. If you think your pain medicine is making you sick to your stomach: Take your medicine after meals (unless your doctor has told you not to). Ask your doctor for a different pain medicine. If your doctor prescribed antibiotics, take them as directed. Do not stop taking them just because you feel better. You need to take the full course of antibiotics. Incision care    If you have a dressing over your cuts (incisions), keep it clean and dry. You may remove it 48 to 72 hours after the surgery. If your incisions are open to the air, keep the area clean and dry. If you have strips of tape on the incisions, leave the tape on for a week or until it falls off. Exercise    Move your toes and ankle as much as your bandages will allow. Bend and straighten your knee slowly several times during the day. Depending on why you had the surgery, you may have to do ankle and leg exercises. Your doctor or physical therapist will give you exercises as part of a rehabilitation program.     Stop any activity that causes sharp pain.  Talk to your doctor or physical therapist about what sports or other exercise you can do. Ice    To reduce swelling and pain, put ice or a cold pack on your knee for 10 to 20 minutes at a time. Do this every 1 to 2 hours. Put a thin cloth between the ice and your skin. Follow-up care is a key part of your treatment and safety. Be sure to make and go to all appointments, and call your doctor if you are having problems. It's also a good idea to know your test results and keep a list of the medicines you take. When should you call for help? Call 911 anytime you think you may need emergency care. For example, call if:    You passed out (lost consciousness). You have severe trouble breathing. You have sudden chest pain and shortness of breath, or you cough up blood. Call your doctor now or seek immediate medical care if:    Your foot or toes are numb or tingling. Your foot is cool or pale, or it changes color. You have signs of a blood clot, such as:  Pain in your calf, back of the knee, thigh, or groin. Redness and swelling in your leg or groin. You are sick to your stomach or cannot keep fluids down. You have pain that does not get better after you take pain medicine. You have loose stitches, or your incision comes open. Bright red blood has soaked through the bandage over your incision. You have signs of infection, such as: Increased pain, swelling, warmth, or redness. Red streaks leading from the incisions. Pus draining from the incisions. A fever. Watch closely for any changes in your health, and be sure to contact your doctor if:    You do not have a bowel movement after taking a laxative. Where can you learn more? Go to [email or web address] and sign in to your [employer] account. Enter N440 in the [employer] box to learn more about \"Knee Arthroscopy: What to Expect at Home. \"     If you do not have an account, please click on the \"Sign Up Now\" link.   Current as of: November 16, 2020               Content Version: 12.9  © 2006-2021 Healthwise, Incorporated. Care instructions adapted under license by [facility] ([facility]). If you have questions about a medical condition or this instruction, always ask your healthcare professional. [address] any warranty or liability for your use of this information.

## 2022-10-05 ENCOUNTER — PREP FOR PROCEDURE (OUTPATIENT)
Dept: ORTHOPEDIC SURGERY | Age: 62
End: 2022-10-05

## 2022-10-05 ENCOUNTER — OFFICE VISIT (OUTPATIENT)
Dept: ORTHOPEDIC SURGERY | Age: 62
End: 2022-10-05
Payer: MEDICARE

## 2022-10-05 VITALS — HEIGHT: 66 IN | WEIGHT: 275 LBS | BODY MASS INDEX: 44.2 KG/M2 | TEMPERATURE: 98 F

## 2022-10-05 DIAGNOSIS — S83.206A TEAR OF MENISCUS OF RIGHT KNEE, UNSPECIFIED MENISCUS, UNSPECIFIED TEAR TYPE, UNSPECIFIED WHETHER OLD OR CURRENT TEAR: Primary | ICD-10-CM

## 2022-10-05 DIAGNOSIS — M22.2X1 PATELLOFEMORAL DISORDER OF RIGHT KNEE: ICD-10-CM

## 2022-10-05 PROCEDURE — 99214 OFFICE O/P EST MOD 30 MIN: CPT | Performed by: ORTHOPAEDIC SURGERY

## 2022-10-05 NOTE — PROGRESS NOTES
Chief Complaint   Patient presents with    Knee Pain     Right knee pain still having a lot of pain discuss options. HPI:    Patient is 60 y.o. female complaining chronic, atraumatic, insidious onset right knee pain for 3 weeks. She admits to stiffness, deep, aching pain, swelling, difficulty with stairs and ambulating far distances. She admits to gross instability specifically in her right knee. Previous treatments include rest, ice, and anti-inflammatory medication and HEP without much relief. She also states that she has quite lots of pain shooting laterally from upper thigh through lateral knee into foot. ROS:    Skin: (-) rash,(-) psoriasis,(-) eczema, (-)skin cancer. Neurologic: (-)numbness, (-)tingling, (-)headaches, (-) LOC. Cardiovascular: (-) Chest pain, (-) swelling in legs/feet, (-) SOB, (-) cramping in legs/feet with walking.     All other review of systems negative except stated above or in HPI      Past Medical History:   Diagnosis Date    Arthritis     Asthma     Diabetes mellitus (HCC)     GERD (gastroesophageal reflux disease)     History of blood transfusion     after daughter 28 yrs ago     Hyperlipidemia     Kidney stones     Neuropathy     Pleurisy     Pneumonia     Sleep apnea     no cpap     Past Surgical History:   Procedure Laterality Date    ANESTHESIA NERVE BLOCK Left 05/15/2019    LEFT LUMBAR TRANSFORAMINAL NERVE BLOCK L3-4 L4-5 WITH IV SEDATION (CPT 12967) performed by Alisson Dennis DO at [facility] 07/24/2019    TRANSFORAMINAL LUMBAR LEFT AT L3-4 AND L4-5 WITH IV SEDATION performed by Alisson Dennis DO at [facility]  08/01/2012    L3,4,5    BREAST LUMPECTOMY Left 03/2022    mass removal   cancerous    BREAST SURGERY      right lumpectomy benign    CHOLECYSTECTOMY, LAPAROSCOPIC      DILATATION, ESOPHAGUS      FINGER TRIGGER RELEASE Bilateral 10/02/2014    HYSTERECTOMY (CERVIX STATUS UNKNOWN)      KNEE ARTHROSCOPY Left 09/20/2016    medial menisectomy, synovectomy, chondroplasty    NERVE BLOCK Left 02/19/2014    left lumbar transforaminal nerve block under x-ray with sedation L3-4, L4-5  #1    NERVE BLOCK Left 02/26/2014    tranfsoramihnla #2 w sedation    NERVE BLOCK  04/30/2014    internal lumbar facet block #1 with sedation L3-4, L4-5, L5-S1    NERVE BLOCK  05/07/2014    bilateral facet block L3-4, L4-5, L5-S1 with IV sedation #2    NERVE BLOCK Bilateral 05/14/2014    lumb facet with sedation #3    NERVE BLOCK Bilateral 02/18/2015    si inj #1 with anesthesia    NERVE BLOCK Bilateral 02/25/2015    si inj #2 with iv sedation    NERVE BLOCK Left 03/28/2018    lumbar transforaminal block #1 with sedation    NERVE BLOCK Left 04/18/2018    lumbar transforaminal nerve block under sedation #2    NERVE BLOCK Right 04/25/2018    lumbar transforaminal #1    NERVE BLOCK Right 05/02/2018    right lumbar transforaminal nerve block under xray #2 L3-5    NERVE BLOCK Left 05/15/2019    left lumbar NB with IV sedation     NERVE BLOCK Left 07/24/2019    left lumbar transforaminal block at L3-5 with IV sedation    OTHER SURGICAL HISTORY Left 03/05/2014    left lumbar transforaminal nerve block  under x-ray with sedation  #3 L3-4, L4-5    OTHER SURGICAL HISTORY Left 06/18/2014    left lumbar radiofrequency L3-4, L4-5, L5-S!    OTHER SURGICAL HISTORY Right 07/28/2014    L3-S1 radiofrequency    CA ALTAGRACIA NOSE/CLEFT LIP/TIP Left 03/28/2018    LEFT LUMBAR TRANSFORMANIAL NERVE BLOCK UNDER XRAY #1 L3-4 L4-5 WITH IV SEDATION performed by Joanna Veliz DO at [address] NOSE/CLEFT LIP/TIP Left 04/18/2018    LEFT LUMBAR TRANSFORMANIAL NERVE BLOCK UNDER XRAY #2 L3-4 L4-5 WITH IV SEDATION performed by Joanna Veliz, DO at [address] NOSE/CLEFT LIP/TIP Right 04/25/2018    RIGHT LUMBAR TRANSFORMANIAL NERVE BLOCK UNDER XRAY #1 L3-4 L4-5 WITH IV SEDATION performed by Joanna Veliz DO at [address] NOSE/CLEFT LIP/TIP Right 05/02/2018    RIGHT LUMBAR TRANSFORMANIAL NERVE BLOCK UNDER XRAY #2 L3-4 L4-5 WITH IV SEDATION performed by Joanna Veliz, DO at [facility]      right by Dr. Peterson Glez         Current Outpatient Medications:     HYDROcodone-acetaminophen (NORCO) 7.5-325 MG per tablet, Take 1 tablet by mouth daily. , Disp: , Rfl:     Probiotic Product (PROBIOTIC DAILY PO), Take 1 tablet by mouth 2 times daily, Disp: , Rfl:     D-Mannose 500 MG CAPS, Take 1 capsule by mouth 2 times daily, Disp: , Rfl:     LUTEIN PO, Take 1 tablet by mouth daily (25mg tab), Disp: , Rfl:     celecoxib (CELEBREX) 200 MG capsule, Take 200 mg by mouth 2 times daily, Disp: , Rfl:     HYDROcodone-ibuprofen (VICOPROFEN) 7.5-200 MG per tablet, , Disp: , Rfl:     morphine (MS CONTIN) 30 MG extended release tablet, Take 30 mg by mouth 2 times daily. , Disp: , Rfl:     SITagliptin (JANUVIA) 100 MG tablet, Take 100 mg by mouth daily, Disp: , Rfl:     metFORMIN (GLUCOPHAGE) 1000 MG tablet, Take 1,000 mg by mouth 2 times daily (with meals), Disp: , Rfl:     glipiZIDE (GLUCOTROL) 10 MG tablet, Take 10 mg by mouth in the morning and 10 mg in the evening. Take before meals. , Disp: , Rfl:     vitamin B-12 (CYANOCOBALAMIN) 1000 MCG tablet, Take 1,000 mcg by mouth daily, Disp: , Rfl:     Exenatide (BYDUREON) 2 MG PEN, Inject into the skin once a week, Disp: , Rfl:     cetirizine (ZYRTEC) 10 MG tablet, Take 10 mg by mouth daily, Disp: , Rfl:     vitamin C (ASCORBIC ACID) 500 MG tablet, Take 1,000 mg by mouth daily, Disp: , Rfl:     calcium carbonate 600 MG TABS tablet, Take 1 tablet by mouth daily, Disp: , Rfl:     Multiple Vitamins-Minerals (CENTRUM SILVER PO), Take by mouth daily, Disp: , Rfl:     Cholecalciferol (VITAMIN D PO), Take 1,000 mg by mouth daily , Disp: , Rfl:     metFORMIN (GLUCOPHAGE) 500 MG tablet, Take 500 mg by mouth daily (before lung,colon,skin cancer            Physical Exam:    Temp 98 °F (36.7 °C)   Ht 5' 6\" (1.676 m)   Wt 275 lb (124.7 kg)   BMI 44.39 kg/m²     GENERAL: alert, appears stated age, cooperative, no acute distress    HEENT: Head is normocephalic, atraumatic. PERRLA. SKIN: Clean, dry, intact. There is not any cellulitis or cutaneous lesions noted in the lower extremities     PULMONARY: breathing is regular and unlabored, no acute distress     CV: The bilateral lower extremities are warm and well-perfused with brisk capillary refill. 2+ pulses LE bilateral.     ABDOMINAL: Nontender, nondistended     PSYCHIATRY: Pleasant mood, appropriate behavior, follows commands     NEURO: Sensation is intact distally with light touch with no alteration. Motor exam of the lower extremities show quadriceps, hamstrings, foot dorsiflexion and plantarflexion grossly intact 5/5. LYMPH: No lymphedema present distally in upper or lower extremity. MUSCULOSKELETAL:    Right Knee Exam:    mild effusion noted. No erythema/induration/fluctuance. Posterior medial joint line TTP. Stable to varus and valgus at 0 and 30 degrees of flexion. Negative Lachman's and posterior drawer. Negative patellar grind test and J sign. Compartments soft and compressible throughout leg. Active range of motion 0-120 with pain. Positive Julius's positive Apley's, gait is antalgic    no change since last visit. Imaging:  DATE OF EXAM: Jul 9 2022 10:32AM       [facility]   [id number]  -  MRI KNEE WO IVCON RT  / ACCESSION #  [id number]     PROCEDURE REASON: RT KNEE PAIN          * * * * Physician Interpretation * * * *         RESULT: MRI KNEE WO IVCON RT     HISTORY: PAIN POSTERIOR LATERAL ASPECT NO KNOWN INJURY     TECHNIQUE:  Multiplanar, multisequence MRI was performed utilizing the   following sequences: Sagittal fat sat T2, PD, and 3D gradient. Coronal   T1 and fat sat PD. Axial T2.         COMPARISON: None available.      RESULT:   There is an incomplete radial tear at the posterior horn medial meniscus   (8:16). The lateral meniscus has normal morphology and signal without tear   identified. Mild thickening of the ACL and PCL with increased intermediate   intrasubstance signal.  Findings are consistent with mucinous   degeneration. Mild thickening of the proximal fibular collateral ligament and MCL   attachments. Collateral ligaments are otherwise intact. Extensor   mechanism is intact. .     There is subchondral cyst formation involving the trochlear groove and   lateral patella. The patella is normally aligned with the trochlea. CARTILAGE:     Medial Femoral Condyle: Small areas(s) of cartilage signal abnormality   with smaller area(s) of low grade (<50% thickness) cartilage   loss/fissuring . Medial Tibial Plateau: Normal .     Lateral Femoral Condyle: Normal .     Lateral Tibial Plateau: Small areas(s) of cartilage signal abnormality   with smaller area(s) of low grade (<50% thickness) cartilage   loss/fissuring . Patella: Large area(s) of cartilage signal abnormality with smaller   area(s) of low grade (<50% thickness) cartilage  loss/fissuring     Trochlea: Moderate sized area(s) of cartilage signal abnormality with   smaller area(s) of high grade (>50% thickness) cartilage loss/fissuring . No joint effusion. No synovitis. No Baker's cyst.  Note is made of a   high origin of the anterior tibial artery which courses deep/anterior to   the popliteus muscle. .     Suprapatellar and infrapatellar fat pads are within normal limits. There   is no plica thickening identified. IMPRESSION - MRI KNEE WO IVCON RT:     1. Incomplete radial tear at the posterior horn medial meniscus. 2.  Degenerative changes with patellofemoral predominance      Ashley Olivarez was seen today for knee pain.     Diagnoses and all orders for this visit:    Tear of meniscus of right knee, unspecified meniscus, unspecified tear type, unspecified whether old or current tear    Patellofemoral disorder of right knee        Patient seen and examined. Patient chart reviewed as well. X-rays reviewed. Patient has little to no arthritis noted on x-rays today. However patient's main complaint is instability of symptomatic knee. Exam and history is consistent with possible meniscus tear. MRI recommended for further evaluation management. Follow-up after MRI    Patient seen and examined. MRI reviewed with patient in detail. Natural history and course discussed with patient in long discussion  Treatment options discussed with patient in detail including risks and benefits. Patient should do well with conservative management as patient would like to avoid surgery at this time. The risks and benefits of a knee arthroscopy were discussed with the patient. The risks are including but not limited to: infection, injuries to blood vessels and nerves, non relief of symptoms, arthrofibrosis of knee, need for further operative intervention, blood loss, PE/DVT, MI and death. Patient verbalized understanding of the discussed procedure along with risks and benefits. The patient was counseled at length about the risks of sienna Covid-19 during their perioperative period and any recovery window from their procedure. The patient was made aware that sienna Covid-19  may worsen their prognosis for recovering from their procedure  and lend to a higher morbidity and/or mortality risk. All material risks, benefits, and reasonable alternatives including postponing the procedure were discussed. In a 15 minute assessment and discussion, patient was counseled on weight loss, healthy diet, and physical activity relating to this condition.  She was educated with options in detail including nutrition, joining a health club/ weight loss program, and use of cardio equipment such as the Arc [employer]er and the importance of use as well as range of motion and HEP exercises for weight loss and general health. Rad Reddy DO            25 minutes was spent with patient. 50% or greater was spent counseling the patient.

## 2022-10-05 NOTE — PATIENT INSTRUCTIONS
Patient Education        Meniscus Tear: Care Instructions  Overview     The meniscus is rubbery tissue in the knee that acts as a shock absorber between the upper and lower leg bones. The meniscus also keeps your knee stable by spreading weight across it. Each knee has two menisci (plural of meniscus). You can tear a meniscus if you plant your foot and twist, or pivot. The meniscus also can wear down as you age, and it can tear from squatting or kneeling. Small tears may heal on their own with rest and some physical therapy. But a more serious tear may need surgery to repair it or to remove part of the meniscus. Your doctor may want you to see a doctor who specializes in bones and sports injuries. Follow-up care is a key part of your treatment and safety. Be sure to make and go to all appointments, and call your doctor if you are having problems. It's also a good idea to know your test results and keep a list of the medicines you take. How can you care for yourself at home? Rest your knee when possible. Do not squat or kneel. Take pain medicines exactly as directed. If the doctor gave you a prescription medicine for pain, take it as prescribed. If you are not taking a prescription pain medicine, ask your doctor if you can take an over-the-counter medicine. Put ice or a cold pack on your knee for 10 to 20 minutes at a time. Try to do this every 1 to 2 hours for the next 3 days (when you are awake) or until the swelling goes down. Put a thin cloth between the ice and your skin. Prop up the sore leg on a pillow when you ice your knee or any time you sit or lie down during the next 3 days. Try to keep your leg above the level of your heart. This will help reduce swelling. Follow your doctor's directions for using crutches or a knee brace, if suggested. Follow your doctor's directions for exercises to keep your knee mobile and your leg muscles strong.  Here are a few exercises you can try if your doctor says it is okay. Quad sets: Lie down on the floor or the bed with your injured leg straight. Fully extend your leg--there should be no or little bend in your knee. Tighten the thigh (quadriceps) of your injured leg for 6 seconds. Do not lift your heel up. Relax your quadriceps for 10 seconds. Repeat this exercise 8 to 12 times several times during the day. Straight-leg raises: Lie down on the floor or the bed with your injured leg flat and your uninjured leg bent so that the bottom of your foot is on the floor or bed. Tighten the quadriceps of your injured leg. Keeping your knee as straight as possible, lift your injured leg off the bed until it is about 18 inches above the bed or floor. Lower your leg back down and relax for 5 seconds. Do 3 sets of 20 repetitions, or if you tire quickly, 3 sets of 8 to 12 repetitions. Heel raises: Stand with your feet a few inches apart. Rest your hands lightly on a counter or chair in front of you. Slowly raise your heels off the floor while keeping your knees straight. Hold for 3 seconds, then slowly lower your heels to the floor. Do 3 sets of 8 to 12 repetitions. Heel slides: Lie down on the floor or the bed with your leg flat. Slowly begin to slide your heel toward your rear end (buttocks), keeping your heel on the floor. Your knee will begin to bend. Slide your heel and bend your knee until it becomes a little sore and you can feel a small amount of pressure inside your knee. Hold this position for 10 seconds. Slide your heel back down until your leg is straight on the floor. Relax for 10 seconds. Repeat this exercise 20 times. When should you call for help? Watch closely for changes in your health, and be sure to contact your doctor if:    You have increasing knee pain or swelling or both. Your knee is so sore or stiff that you cannot walk on it. You do not get better as expected. Where can you learn more? Go to https://alicia.healthOfferum. org and sign in to your [employer] account. Enter V533 in the [facility] box to learn more about \"Meniscus Tear: Care Instructions. \"     If you do not have an account, please click on the \"Sign Up Now\" link. Current as of: November 16, 2020               Content Version: 12.9  © 2006-2021 [employer]. Care instructions adapted under license by [facility] ([facility]). If you have questions about a medical condition or this instruction, always ask your healthcare professional. [address] any warranty or liability for your use of this information. Patient Education        Meniscus Surgery: Before Your Surgery  What is meniscus surgery? Meniscus surgery removes or fixes the cartilage between the bones in the knee. This cartilage is called the meniscus. Each knee has two of these rubbery pads of cartilage, one on either side. When a meniscus tears, your knee may be painful, swell, get stiff, or lock up. Your doctor may use small tools to remove parts of the damaged meniscus and smooth the edges. This is called a partial meniscectomy (say \"men--K-tu-jayant\"). In some cases, tears in the meniscus can be sewn back together. But if your meniscus can't be repaired, the doctor may remove the damaged part. Meniscus surgery is usually done as arthroscopic surgery. Your doctor uses a lighted tube called an arthroscope, or scope. He or she puts the scope and other surgical tools through small cuts in your knee. These cuts are called incisions. They leave scars that usually fade with time. The surgery will take at least 1 hour. Most people go home the same day of the surgery. You may have to use crutches after surgery. If so, be sure you have a backpack or clothes with a lot of pockets to carry items. You may be able to go back to school in 1 to 2 weeks. If you have a job and you sit at work, you may be able to go back in 1 to 2 weeks.  But if you are on your feet at work, it may take 4 to 6 weeks. If you are very physically active in your job, it may take 3 to 6 months. You may need physical rehabilitation (rehab) after surgery. The program may last for several months. At first, your physical therapist will work with you. Later, you will get exercises to do on your own. After surgery and rehab, you are likely to have less pain and more flexibility in your knee. How soon you can return to sports or exercise depends on how well you follow your rehab program and how well your knee heals. If you had a partial meniscectomy, you might be able to play sports in about 1 to 2 months. If you had meniscus repair, it may be 3 to 6 months before you can play sports. Follow-up care is a key part of your treatment and safety. Be sure to make and go to all appointments, and call your doctor if you are having problems. It's also a good idea to know your test results and keep a list of the medicines you take. How do you prepare for surgery? Surgery can be stressful. This information will help you understand what you can expect. And it will help you safely prepare for surgery. Preparing for surgery    Be sure you have someone to take you home. Anesthesia and pain medicine will make it unsafe for you to drive or get home on your own. Understand exactly what surgery is planned, along with the risks, benefits, and other options. If you take aspirin or some other blood thinner, ask your doctor if you should stop taking it before your surgery. Make sure that you understand exactly what your doctor wants you to do. These medicines increase the risk of bleeding. Tell your doctor ALL the medicines, vitamins, supplements, and herbal remedies you take. Some may increase the risk of problems during your surgery. Your doctor will tell you if you should stop taking any of them before the surgery and how soon to do it. Make sure your doctor and the hospital have a copy of your advance directive.  If you don't have one, you may want to prepare one. It lets others know your health care wishes. It's a good thing to have before any type of surgery or procedure. What happens on the day of surgery? Follow the instructions exactly about when to stop eating and drinking. If you don't, your surgery may be canceled. If your doctor told you to take your medicines on the day of surgery, take them with only a sip of water. Take a bath or shower before you come in for your surgery. Do not apply lotions, perfumes, deodorants, or nail polish. Do not shave the surgical site yourself. Take off all jewelry and piercings. And take out contact lenses, if you wear them. At the hospital or surgery center   Bring a picture ID. The area for surgery is often marked to make sure there are no errors. You will be kept comfortable and safe by your anesthesia provider. The anesthesia may make you sleep. Or it may just numb the area being worked on. The surgery will take at least 1 hour. Your leg may be in a leg brace to limit motion. You may need to wear a brace for 4 to 6 weeks after surgery. You may have a device that applies cold treatment to your knee. When should you call your doctor? You have questions or concerns. You don't understand how to prepare for your surgery. You become ill before the surgery (such as fever, flu, or a cold). You need to reschedule or have changed your mind about having the surgery. Where can you learn more? Go to https://Weole [email or web address] [email or web address].Crystalplex. org and sign in to your [employer] account. Enter E050 in the [facility] box to learn more about \"Meniscus Surgery: Before Your Surgery. \"     If you do not have an account, please click on the \"Sign Up Now\" link. Current as of: November 16, 2020               Content Version: 12.9  © 2006-2021 [employer]. Care instructions adapted under license by [facility] ([facility]).  If you have questions about a medical condition or this instruction, always ask your healthcare professional. [address] any warranty or liability for your use of this information. Patient Education        Meniscus Surgery: What to Expect at Home  Your Recovery  Meniscus surgery removes or fixes the cartilage (meniscus) between the bones in the knee. Each knee has two of these rubbery pads of cartilage, one on either side. Meniscus repair is usually done with arthroscopic surgery. Your doctor put a lighted tube--called an arthroscope or scope--and other surgical tools through small cuts (incisions) in your knee. The incisions leave scars that usually fade in time. You will feel tired for several days. Your knee will be swollen. And you may have numbness around the cuts the doctor made (incisions) on your knee. You can put ice on the knee to reduce swelling. Most of this will go away in a few days. You should soon start seeing improvement in your knee. You may be able to return to most of your regular activities within a few weeks. But it will be several months before you have complete use of your knee. It may take as long as 6 months before your knee is strong enough for hard physical work or certain sports. You will need to build your strength and the motion of your joint with rehabilitation (rehab) exercises. In time, your knee will likely be stronger and more stable than it was before the surgery. How soon you can return to sports or exercise depends on how well you follow your rehab program and how well your knee heals. Your doctor or physical therapist will give you an idea of when you can return to these activities. If you had a partial meniscectomy, you might be able to play sports in about 4 to 6 weeks. If you had meniscus repair, it may be 3 to 6 months before you can play sports. This care sheet gives you a general idea about how long it will take for you to recover.  But each person recovers at a different pace. Follow the steps below to get better as quickly as possible. How can you care for yourself at home? Activity    Rest when you feel tired. Getting enough sleep will help you recover. Sleep with your knee raised, but not bent. Put a pillow under your foot. Keep your leg raised as much as possible for the first few days. You may shower 24 to 48 hours after surgery, if your doctor okays it. When you shower, keep your bandage and incisions dry by taping a sheet of plastic to cover them. If you have a brace, take it off if your doctor says it is okay. It might help to sit on a shower stool. You will be able to stand if you have a brace or use crutches. Do not put weight on your leg until your doctor says you can. You can move around the house to do daily tasks. If you have a brace, leave it on except when you exercise your knee or you shower. Be careful not to put the brace on too tight. You will use it for about 2 to 6 weeks. If your doctor does not want you to shower or remove your brace, you can take a sponge bath. Wait 2 weeks or until your doctor says it is okay before you take a bath, swim, use a hot tub, or soak your leg. You can drive when you are no longer using crutches or a knee brace, are no longer taking prescription pain medicine, and have some control over your knee. This usually takes 1 to 2 weeks. How soon you can return to work depends on your job. If you sit at work, you may be able to go back in 1 to 2 weeks. But if you are on your feet at work, it may take 4 to 6 weeks. If you are very physically active in your job, it may take 3 to 6 months. Diet    You can eat your normal diet. If your stomach is upset, try bland, low-fat foods like plain rice, broiled chicken, toast, and yogurt. Drink plenty of fluids. You may notice that your bowel movements are not regular right after your surgery. This is common.  Try to avoid constipation and straining with bowel movements. You may want to take a fiber supplement every day. If you have not had a bowel movement after a couple of days, ask your doctor about taking a mild laxative. Medicines    Your doctor will tell you if and when you can restart your medicines. He or she will also give you instructions about taking any new medicines. If you take aspirin or some other blood thinner, ask your doctor if and when to start taking it again. Make sure that you understand exactly what your doctor wants you to do. Be safe with medicines. Take pain medicines exactly as directed. If the doctor gave you a prescription medicine for pain, take it as prescribed. If you are not taking a prescription pain medicine, ask your doctor if you can take an over-the-counter medicine. If your doctor prescribed antibiotics, take them as directed. Do not stop taking them just because you feel better. You need to take the full course of antibiotics. If you think your pain medicine is making you sick to your stomach: Take your medicine after meals (unless your doctor has told you not to). Ask your doctor for a different pain medicine. Incision care    If you have a bandage over your incisions, keep the bandage clean and dry. Follow your doctor's instructions. Some doctors want to see you before you take it off, while others may let you take it off 48 to 72 hours after your surgery. If you have strips of tape on the incisions, leave the tape on for a week or until it falls off. Keep the area clean and dry. Exercise    Rehab exercises are an important part of your treatment. Your first exercises will help you improve your knee's movement and regain your muscle strength. Ice and elevation    To reduce swelling and pain, put ice or a cold pack on your knee for 10 to 20 minutes at a time. Do this every few hours. Put a thin cloth between the ice and your skin.      For 3 days after surgery, prop up the sore leg on a pillow when you ice it or anytime you sit or lie down. Try to keep it above the level of your heart. This will help reduce swelling. If your doctor gave you support stockings, wear them as long as he or she tells you to. These help prevent blood clots. Follow-up care is a key part of your treatment and safety. Be sure to make and go to all appointments, and call your doctor if you are having problems. It's also a good idea to know your test results and keep a list of the medicines you take. When should you call for help? Call 911 anytime you think you may need emergency care. For example, call if:    You passed out (lost consciousness). You have severe trouble breathing. You have sudden chest pain and shortness of breath, or you cough up blood. Call your doctor now or seek immediate medical care if:    You have pain that does not go away after you take pain medicine. You have loose stitches, or your incisions come open. Bright red blood has soaked through the bandage over your incision. You have signs of infection, such as: Increased pain, swelling, warmth, or redness. Red streaks leading from the incision. Pus draining from the incision. Swollen lymph nodes in your neck, armpits, or groin. A fever. You have signs of a blood clot, such as:  Pain in your calf, back of the knee, thigh, or groin. Redness and swelling in your leg or groin. Watch closely for any changes in your health, and be sure to contact your doctor if:    You feel a catching or locking in your knee. You are sick to your stomach or cannot keep fluids down. You have swelling, tingling, pain, or numbness in your toes that does not go away when you raise your knee above the level of your heart. You do not have a bowel movement after taking a laxative. Where can you learn more? Go to https://alicia.[employer]. org and sign in to your [employer] account.  Enter I377 in the [facility] box to learn more about \"Meniscus Surgery: What to Expect at Home. \"     If you do not have an account, please click on the \"Sign Up Now\" link. Current as of: November 16, 2020               Content Version: 12.9  © 2006-2021 [employer]. Care instructions adapted under license by [facility] ([facility]). If you have questions about a medical condition or this instruction, always ask your healthcare professional. [address] any warranty or liability for your use of this information. Patient Education        Knee Arthroscopy: Before Your Surgery  What is knee arthroscopy? Arthroscopy is a way to find problems and do surgery inside a joint without making a large cut (incision). Your doctor puts a lighted tube with a tiny camera and surgical tools through small incisions in your knee. The camera is called an arthroscope, or scope. In this surgery, your doctor may:  Remove or repair a torn piece of cartilage or loose bone. Replace a torn anterior cruciate ligament (ACL) with a piece of tissue. This repair is called a graft. Smooth the rough surfaces of your joint. Most people go home on the day of the surgery or the next day. If you have a simple injury, it may take at least 6 weeks to recover. It may take longer if your doctor had to repair damaged tissue. You will need to limit activity while your knee heals. You may need to have physical therapy (rehab) to help your knee get stronger. If you have a desk job, you may be able to go back to work a few days after treatment of a simple injury. If you lift things or stand or walk a lot at work, it may be 2 to 6 weeks before you can go back. After surgery and rehab, you will probably have less pain. Your knee should be stronger. You should be able to use your knee and leg better. Some people have to avoid lifting heavy objects. Follow-up care is a key part of your treatment and safety.  Be sure to make and go to all appointments, and call your doctor if you are having problems. It's also a good idea to know your test results and keep a list of the medicines you take. How do you prepare for surgery? Surgery can be stressful. This information will help you understand what you can expect. And it will help you safely prepare for surgery. Preparing for surgery    Be sure you have someone to take you home. Anesthesia and pain medicine will make it unsafe for you to drive or get home on your own. Understand exactly what surgery is planned, along with the risks, benefits, and other options. Tell your doctor ALL the medicines, vitamins, supplements, and herbal remedies you take. Some may increase the risk of problems during your surgery. Your doctor will tell you if you should stop taking any of them before the surgery and how soon to do it. If you take aspirin or some other blood thinner, ask your doctor if you should stop taking it before your surgery. Make sure that you understand exactly what your doctor wants you to do. These medicines increase the risk of bleeding. Make sure your doctor and the hospital have a copy of your advance directive. If you don't have one, you may want to prepare one. It lets others know your health care wishes. It's a good thing to have before any type of surgery or procedure. What happens on the day of surgery? Follow the instructions exactly about when to stop eating and drinking. If you don't, your surgery may be canceled. If your doctor told you to take your medicines on the day of surgery, take them with only a sip of water. Take a bath or shower before you come in for your surgery. Do not apply lotions, perfumes, deodorants, or nail polish. Do not shave the surgical site yourself. Take off all jewelry and piercings. And take out contact lenses, if you wear them. At the hospital or surgery center   Bring a picture ID.      The area for surgery is often marked to make sure there are no errors. You will be kept comfortable and safe by your anesthesia provider. The anesthesia may make you sleep. Or it may just numb the area being worked on. The surgery will take about 1 to 2 hours. It depends on how much repair needs to be done to your knee. When should you call your doctor? You have questions or concerns. You don't understand how to prepare for your surgery. You become ill before the surgery (such as fever, flu, or a cold). You need to reschedule or have changed your mind about having the surgery. Where can you learn more? Go to https://TopOPPSpepiceweb.[email or web address]. org and sign in to your [employer] account. Enter S085 in the [employer] box to learn more about \"Knee Arthroscopy: Before Your Surgery. \"     If you do not have an account, please click on the \"Sign Up Now\" link. Current as of: November 16, 2020               Content Version: 12.9  © 2006-2021 [employer]. Care instructions adapted under license by [facility] ([facility]). If you have questions about a medical condition or this instruction, always ask your healthcare professional. [place] [address] any warranty or liability for your use of this information. Patient Education        Knee Arthroscopy: What to Expect at Home  Your Recovery     Arthroscopy is a way to find problems and do surgery inside a joint without making a large cut (incision). Your doctor put a lighted tube with a tiny camera--called an arthroscope, or scope--and surgical tools through small incisions in your knee. You will feel tired for several days. Your knee will be swollen. And you may notice that your skin is a different color near the cuts (incisions). The swelling is normal and will start to go away in a few days. Keeping your leg higher than your heart will help with swelling and pain. You will probably need about 6 weeks to recover.  If your doctor repaired damaged tissue, recovery will take longer. You may have to limit your activity until your knee strength and movement are back to normal. You may also be in a physical rehabilitation (rehab) program.  You may be able to return to a desk job or your normal routine in a few days. But if you do physical labor, it may be as long as 2 months before you can go back to work. This care sheet gives you a general idea about how long it will take for you to recover. But each person recovers at a different pace. Follow the steps below to get better as quickly as possible. How can you care for yourself at home? Activity    Rest when you feel tired. Getting enough sleep will help you recover. Use pillows to raise your ankle and leg above the level of your heart. Try to walk each day, after your doctor has said you can. Start by walking a little more than you did the day before. Bit by bit, increase the amount you walk. Walking boosts blood flow and helps prevent pneumonia and constipation. You may have a brace or crutches or both. Your doctor will tell you how often and how much you can move your leg and knee. If you have a desk job, you may be able to return to work a few days after the surgery. If you lift things or stand or walk a lot at work, it may be as long as 2 months before you can return. You can take a shower 48 to 72 hours after surgery and clean the incisions with regular soap and water. Do not take a bath or soak your knee until your doctor says it is okay. Ask your doctor when you can drive again. If you had a repair of torn tissue, follow your doctor's instructions for lifting things or moving your knee. Diet    You can eat your normal diet. If your stomach is upset, try bland, low-fat foods like plain rice, broiled chicken, toast, and yogurt. Drink plenty of fluids, unless your doctor tells you not to.      You may notice that your bowel movements are not regular right after your surgery. This is common. Try to avoid constipation and straining with bowel movements. You may want to take a fiber supplement every day. If you have not had a bowel movement after a couple of days, ask your doctor about taking a mild laxative. Medicines    Your doctor will tell you if and when you can restart your medicines. He or she will also give you instructions about taking any new medicines. If you take aspirin or some other blood thinner, ask your doctor if and when to start taking it again. Make sure that you understand exactly what your doctor wants you to do. Be safe with medicines. Take pain medicines exactly as directed. If the doctor gave you a prescription medicine for pain, take it as prescribed. If you are not taking a prescription pain medicine, ask your doctor if you can take an over-the-counter medicine. If you think your pain medicine is making you sick to your stomach: Take your medicine after meals (unless your doctor has told you not to). Ask your doctor for a different pain medicine. If your doctor prescribed antibiotics, take them as directed. Do not stop taking them just because you feel better. You need to take the full course of antibiotics. Incision care    If you have a dressing over your cuts (incisions), keep it clean and dry. You may remove it 48 to 72 hours after the surgery. If your incisions are open to the air, keep the area clean and dry. If you have strips of tape on the incisions, leave the tape on for a week or until it falls off. Exercise    Move your toes and ankle as much as your bandages will allow. Bend and straighten your knee slowly several times during the day. Depending on why you had the surgery, you may have to do ankle and leg exercises. Your doctor or physical therapist will give you exercises as part of a rehabilitation program.     Stop any activity that causes sharp pain.  Talk to your doctor or physical therapist November 16, 2020               Content Version: 12.9  © 2006-2021 [employer], [employer]. Care instructions adapted under license by [address] If you have questions about a medical condition or this instruction, always ask your healthcare professional. [address] any warranty or liability for your use of this information.

## 2022-10-06 ENCOUNTER — TELEPHONE (OUTPATIENT)
Dept: ORTHOPEDIC SURGERY | Age: 62
End: 2022-10-06

## 2022-10-06 NOTE — TELEPHONE ENCOUNTER
Prior Authorization Form:      DEMOGRAPHICS:                     Patient Name:  Magdalene Saldana  Patient :  1960            Insurance:  Payor: Maurene Homans / Plan: Cristian Roberson PPO / Product Type: Medicare /   Insurance ID Number:    Payer/Plan Subscr  Sex Relation Sub. Ins. ID Effective Group Num   1.  Maurene Homans* Dheeraj Chamberlain* 1960 Female Self [id number]                                    [id number]         DIAGNOSIS & PROCEDURE:                       Procedure/Operation:  RIGHT KNEE ARTHROSCOPY PARTIAL MENISECTOMY AND SAMMY           CPT Code:  41187    Diagnosis:   RIGHT KNEE MENISCUS TEAR    ICD10 Code: U32.836F    Location:  [facility]     Surgeon:  DR Pauly Barclay INFORMATION:                          Date:   22  Time:               Anesthesia:  General                                                       Status:  Outpatient        Special Comments:         Electronically signed by Brandie Alonzo MA on 10/6/2022 at 7:59 AM a

## 2022-10-11 ENCOUNTER — ANESTHESIA EVENT (OUTPATIENT)
Dept: OPERATING ROOM | Age: 62
End: 2022-10-11
Payer: MEDICARE

## 2022-10-11 NOTE — ANESTHESIA PRE PROCEDURE
Department of Anesthesiology  Preprocedure Note       Name:  Kelly Krishnamurthy   Age:  60 y.o.  :  1960                                          MRN:  [id number]         Date:  10/11/2022      Surgeon: Dawna Arciniega):  Daniel Zamudio DO    Procedure: RIGHT PIRIFORMIS INJECTION UNDER XRAY  (CPT 76372) (Right)    Medications prior to admission:   Prior to Admission medications    Medication Sig Start Date End Date Taking? Authorizing Provider   HYDROcodone-acetaminophen (NORCO) 7.5-325 MG per tablet Take 1 tablet by mouth daily. 8/3/22   Historical Provider, MD   Probiotic Product (PROBIOTIC DAILY PO) Take 1 tablet by mouth 2 times daily    Historical Provider, MD   D-Mannose 500 MG CAPS Take 1 capsule by mouth 2 times daily    Historical Provider, MD   LUTEIN PO Take 1 tablet by mouth daily (25mg tab)    Historical Provider, MD   celecoxib (CELEBREX) 200 MG capsule Take 200 mg by mouth 2 times daily 22   Historical Provider, MD   HYDROcodone-ibuprofen (Dorathy Saltness) 7.5-200 MG per tablet  22   Historical Provider, MD   morphine (MS CONTIN) 30 MG extended release tablet Take 30 mg by mouth 2 times daily. 22   Historical Provider, MD   SITagliptin (JANUVIA) 100 MG tablet Take 100 mg by mouth daily    Historical Provider, MD   metFORMIN (GLUCOPHAGE) 1000 MG tablet Take 1,000 mg by mouth 2 times daily (with meals) 22   Historical Provider, MD   glipiZIDE (GLUCOTROL) 10 MG tablet Take 10 mg by mouth in the morning and 10 mg in the evening. Take before meals.     Historical Provider, MD   vitamin B-12 (CYANOCOBALAMIN) 1000 MCG tablet Take 1,000 mcg by mouth daily    Historical Provider, MD   Exenatide (BYDUREON) 2 MG PEN Inject into the skin once a week    Historical Provider, MD   cetirizine (ZYRTEC) 10 MG tablet Take 10 mg by mouth daily    Historical Provider, MD   vitamin C (ASCORBIC ACID) 500 MG tablet Take 1,000 mg by mouth daily    Historical Provider, MD   calcium carbonate 600 MG TABS tablet Take 1 tablet by mouth daily    Historical Provider, MD   Multiple Vitamins-Minerals (CENTRUM SILVER PO) Take by mouth daily    Historical Provider, MD   Cholecalciferol (VITAMIN D PO) Take 1,000 mg by mouth daily     Historical Provider, MD   metFORMIN (GLUCOPHAGE) 500 MG tablet Take 500 mg by mouth daily (before lunch)    Historical Provider, MD   omeprazole (PRILOSEC) 20 MG delayed release capsule Take 20 mg by mouth daily     Historical Provider, MD   simvastatin (ZOCOR) 40 MG tablet Take 40 mg by mouth nightly    Historical Provider, MD   hydroxychloroquine (PLAQUENIL) 200 MG tablet Take by mouth 2 times daily     Historical Provider, MD   ezetimibe (ZETIA) 10 MG tablet Take 10 mg by mouth nightly     Historical Provider, MD   gemfibrozil (LOPID) 600 MG tablet Take 600 mg by mouth 2 times daily (before meals)    Historical Provider, MD       Current medications:    Current Outpatient Medications   Medication Sig Dispense Refill    HYDROcodone-acetaminophen (NORCO) 7.5-325 MG per tablet Take 1 tablet by mouth daily.  Probiotic Product (PROBIOTIC DAILY PO) Take 1 tablet by mouth 2 times daily      D-Mannose 500 MG CAPS Take 1 capsule by mouth 2 times daily      LUTEIN PO Take 1 tablet by mouth daily (25mg tab)      celecoxib (CELEBREX) 200 MG capsule Take 200 mg by mouth 2 times daily      HYDROcodone-ibuprofen (VICOPROFEN) 7.5-200 MG per tablet       morphine (MS CONTIN) 30 MG extended release tablet Take 30 mg by mouth 2 times daily.  SITagliptin (JANUVIA) 100 MG tablet Take 100 mg by mouth daily      metFORMIN (GLUCOPHAGE) 1000 MG tablet Take 1,000 mg by mouth 2 times daily (with meals)      glipiZIDE (GLUCOTROL) 10 MG tablet Take 10 mg by mouth in the morning and 10 mg in the evening. Take before meals.       vitamin B-12 (CYANOCOBALAMIN) 1000 MCG tablet Take 1,000 mcg by mouth daily      Exenatide (BYDUREON) 2 MG PEN Inject into the skin once a week      cetirizine (ZYRTEC) 10 MG tablet Take 10 mg by mouth daily      vitamin C (ASCORBIC ACID) 500 MG tablet Take 1,000 mg by mouth daily      calcium carbonate 600 MG TABS tablet Take 1 tablet by mouth daily      Multiple Vitamins-Minerals (CENTRUM SILVER PO) Take by mouth daily      Cholecalciferol (VITAMIN D PO) Take 1,000 mg by mouth daily       metFORMIN (GLUCOPHAGE) 500 MG tablet Take 500 mg by mouth daily (before lunch)      omeprazole (PRILOSEC) 20 MG delayed release capsule Take 20 mg by mouth daily       simvastatin (ZOCOR) 40 MG tablet Take 40 mg by mouth nightly      hydroxychloroquine (PLAQUENIL) 200 MG tablet Take by mouth 2 times daily       ezetimibe (ZETIA) 10 MG tablet Take 10 mg by mouth nightly       gemfibrozil (LOPID) 600 MG tablet Take 600 mg by mouth 2 times daily (before meals)       No current facility-administered medications for this visit. Allergies:     Allergies   Allergen Reactions    Latex Rash     sensitive       Problem List:    Patient Active Problem List   Diagnosis Code    Lumbar post-laminectomy syndrome M96.1    Neural foraminal stenosis of lumbar spine M48.061    Lumbar radiculopathy left greater than right M54.16    Diabetes mellitus ([address] E11.9    Peripheral neuropathy G62.9    Protruded lumbar disc M51.26    DDD (degenerative disc disease), lumbar M51.36    Facet syndrome, lumbar M47.816    Trigger ring finger of right hand M65.341    Trigger middle finger of left hand M65.332    Sacroiliitis (HCC) bilateral M46.1    Epigastric pain R10.13    Acute meniscal tear of knee S83.209A    Primary osteoarthritis of left knee M17.12    Patellofemoral disorder of right knee M22.2X1       Past Medical History:        Diagnosis Date    Arthritis     Asthma     Diabetes mellitus ([address]     GERD (gastroesophageal reflux disease)     History of blood transfusion     after daughter 28 yrs ago     Hyperlipidemia     Kidney stones     Neuropathy     Pleurisy     Pneumonia     Sleep apnea no cpap       Past Surgical History:        Procedure Laterality Date    ANESTHESIA NERVE BLOCK Left 05/15/2019    LEFT LUMBAR TRANSFORAMINAL NERVE BLOCK L3-4 L4-5 WITH IV SEDATION (CPT 99050) performed by Daniel Zamudio DO at [address] [place] [address] Road Left 07/24/2019    TRANSFORAMINAL LUMBAR LEFT AT L3-4 AND L4-5 WITH IV SEDATION performed by Daniel Zamudio DO at [place] [address]  08/01/2012    L3,4,5    BREAST LUMPECTOMY Left 03/2022    mass removal   cancerous    BREAST SURGERY      right lumpectomy benign    CHOLECYSTECTOMY, LAPAROSCOPIC      DILATATION, ESOPHAGUS      FINGER TRIGGER RELEASE Bilateral 10/02/2014    HYSTERECTOMY (CERVIX STATUS UNKNOWN)      KNEE ARTHROSCOPY Left 09/20/2016    medial menisectomy, synovectomy, chondroplasty    NERVE BLOCK Left 02/19/2014    left lumbar transforaminal nerve block under x-ray with sedation L3-4, L4-5  #1    NERVE BLOCK Left 02/26/2014    tranfsoramihnla #2 w sedation    NERVE BLOCK  04/30/2014    internal lumbar facet block #1 with sedation L3-4, L4-5, L5-S1    NERVE BLOCK  05/07/2014    bilateral facet block L3-4, L4-5, L5-S1 with IV sedation #2    NERVE BLOCK Bilateral 05/14/2014    lumb facet with sedation #3    NERVE BLOCK Bilateral 02/18/2015    si inj #1 with anesthesia    NERVE BLOCK Bilateral 02/25/2015    si inj #2 with iv sedation    NERVE BLOCK Left 03/28/2018    lumbar transforaminal block #1 with sedation    NERVE BLOCK Left 04/18/2018    lumbar transforaminal nerve block under sedation #2    NERVE BLOCK Right 04/25/2018    lumbar transforaminal #1    NERVE BLOCK Right 05/02/2018    right lumbar transforaminal nerve block under xray #2 L3-5    NERVE BLOCK Left 05/15/2019    left lumbar NB with IV sedation     NERVE BLOCK Left 07/24/2019    left lumbar transforaminal block at L3-5 with IV sedation    OTHER SURGICAL HISTORY Left 03/05/2014    left lumbar transforaminal nerve block  under x-ray with sedation  #3 L3-4, L4-5    OTHER SURGICAL HISTORY Left 2014    left lumbar radiofrequency L3-4, L4-5, L5-S!    OTHER SURGICAL HISTORY Right 2014    L3-S1 radiofrequency    DE ALTAGRACIA NOSE/CLEFT LIP/TIP Left 2018    LEFT LUMBAR TRANSFORMANIAL NERVE BLOCK UNDER XRAY #1 L3-4 L4-5 WITH IV SEDATION performed by Joanna Veliz, DO at [address] NOSE/CLEFT LIP/TIP Left 2018    LEFT LUMBAR TRANSFORMANIAL NERVE BLOCK UNDER XRAY #2 L3-4 L4-5 WITH IV SEDATION performed by Evloren Veliz, DO at [address] NOSE/CLEFT LIP/TIP Right 2018    RIGHT LUMBAR TRANSFORMANIAL NERVE BLOCK UNDER XRAY #1 L3-4 L4-5 WITH IV SEDATION performed by Joanna Veliz, DO at [address] NOSE/CLEFT LIP/TIP Right 2018    RIGHT LUMBAR TRANSFORMANIAL NERVE BLOCK UNDER XRAY #2 L3-4 L4-5 WITH IV SEDATION performed by Joanna Veliz, DO at [address] [facility]      right by Dr. Omar Ortiz History:    Social History     Tobacco Use    Smoking status: Former     Years: 10.00     Types: Cigarettes     Quit date: 2015     Years since quittin.2    Smokeless tobacco: Never   Substance Use Topics    Alcohol use: No                                Counseling given: Not Answered      Vital Signs (Current): There were no vitals filed for this visit.                                            BP Readings from Last 3 Encounters:   20 128/76   19 133/80   19 108/61       NPO Status:  >8.H                                                                               BMI:   Wt Readings from Last 3 Encounters:   10/10/22 270 lb (122.5 kg)   10/05/22 275 lb (124.7 kg)   22 284 lb 1.6 oz (128.9 kg)     There is no height or weight on file to calculate BMI.    CBC:   Lab Results   Component Value Date/Time    WBC 11.1 2016 05:18 AM    RBC 4.93 03/08/2016 05:18 AM    HGB 12.2 03/08/2016 05:18 AM    HCT 38.2 03/08/2016 05:18 AM    MCV 77.6 03/08/2016 05:18 AM    RDW 13.9 03/08/2016 05:18 AM     03/08/2016 05:18 AM       CMP:   Lab Results   Component Value Date/Time     11/04/2020 12:35 PM    K 5.1 11/04/2020 12:35 PM     11/04/2020 12:35 PM    CO2 23 11/04/2020 12:35 PM    BUN 15 11/04/2020 12:35 PM    CREATININE 0.8 11/04/2020 12:35 PM    GFRAA >60 11/04/2020 12:35 PM    LABGLOM >60 11/04/2020 12:35 PM    GLUCOSE 157 11/04/2020 12:35 PM    GLUCOSE 94 07/14/2011 11:44 AM    PROT 7.4 11/04/2020 12:35 PM    CALCIUM 9.9 11/04/2020 12:35 PM    BILITOT 0.3 11/04/2020 12:35 PM    ALKPHOS 122 11/04/2020 12:35 PM    AST 89 11/04/2020 12:35 PM    ALT 74 11/04/2020 12:35 PM       POC Tests: No results for input(s): POCGLU, POCNA, POCK, POCCL, POCBUN, POCHEMO, POCHCT in the last 72 hours. Coags:   Lab Results   Component Value Date/Time    PROTIME 11.1 03/07/2016 06:50 AM    INR 1.1 03/07/2016 06:50 AM       HCG (If Applicable): No results found for: PREGTESTUR, PREGSERUM, HCG, HCGQUANT     ABGs: No results found for: PHART, PO2ART, UWV4QBV, WYS2XRL, BEART, L4KKQSXI     Type & Screen (If Applicable):  No results found for: LABABO, [address]    Anesthesia Evaluation  Patient summary reviewed  Airway: Mallampati: II  TM distance: >3 FB   Neck ROM: full  Comment: Fat neck  Mouth opening: > = 3 FB   Dental:    (+) caps  Comment: Dentition intact, denies any loose teeth.     Pulmonary: breath sounds clear to auscultation  (+) pneumonia:  sleep apnea: on CPAP,  asthma:                            Cardiovascular:Negative CV ROS  Exercise tolerance: good (>4 METS),   (+) hyperlipidemia        Rhythm: regular  Rate: normal                    Neuro/Psych:   (+) neuromuscular disease:,              ROS comment: H/O Motion sickness GI/Hepatic/Renal:   (+) GERD:, renal disease: CRI and kidney stones, morbid obesity          Endo/Other:    (+) Diabeteswell controlled, , : arthritis: OA and rheumatoid. , .                 Abdominal:   (+) obese,           Vascular: negative vascular ROS. Other Findings:             Anesthesia Plan      MAC     ASA 3       Induction: intravenous. Anesthetic plan and risks discussed with patient. Plan discussed with CRNA.                   Yamila Marie MD   39-23-12

## 2022-10-12 ENCOUNTER — HOSPITAL ENCOUNTER (OUTPATIENT)
Dept: OPERATING ROOM | Age: 62
Setting detail: OUTPATIENT SURGERY
Discharge: HOME | End: 2022-10-12
Attending: ANESTHESIOLOGY
Payer: MEDICARE

## 2022-10-12 ENCOUNTER — HOSPITAL ENCOUNTER (OUTPATIENT)
Age: 62
Setting detail: OUTPATIENT SURGERY
Discharge: HOME | End: 2022-10-12
Attending: ANESTHESIOLOGY | Admitting: ANESTHESIOLOGY
Payer: MEDICARE

## 2022-10-12 ENCOUNTER — ANESTHESIA (OUTPATIENT)
Dept: OPERATING ROOM | Age: 62
End: 2022-10-12
Payer: MEDICARE

## 2022-10-12 VITALS
SYSTOLIC BLOOD PRESSURE: 113 MMHG | HEART RATE: 83 BPM | RESPIRATION RATE: 14 BRPM | HEIGHT: 66 IN | DIASTOLIC BLOOD PRESSURE: 70 MMHG | BODY MASS INDEX: 45.64 KG/M2 | OXYGEN SATURATION: 99 % | TEMPERATURE: 97 F | WEIGHT: 284 LBS

## 2022-10-12 DIAGNOSIS — R52 PAIN: ICD-10-CM

## 2022-10-12 PROBLEM — G57.00 PIRIFORMIS SYNDROME: Chronic | Status: ACTIVE | Noted: 2022-10-12

## 2022-10-12 LAB — METER GLUCOSE: 120 MG/DL (ref 74–99)

## 2022-10-12 PROCEDURE — 2709999900 HC NON-CHARGEABLE SUPPLY: Performed by: ANESTHESIOLOGY

## 2022-10-12 PROCEDURE — 3600000005 HC SURGERY LEVEL 5 BASE: Performed by: ANESTHESIOLOGY

## 2022-10-12 PROCEDURE — 6360000002 HC RX W HCPCS: Performed by: NURSE ANESTHETIST, CERTIFIED REGISTERED

## 2022-10-12 PROCEDURE — 3700000000 HC ANESTHESIA ATTENDED CARE: Performed by: ANESTHESIOLOGY

## 2022-10-12 PROCEDURE — 3209999900 FLUORO FOR SURGICAL PROCEDURES

## 2022-10-12 PROCEDURE — 7100000011 HC PHASE II RECOVERY - ADDTL 15 MIN: Performed by: ANESTHESIOLOGY

## 2022-10-12 PROCEDURE — 82962 GLUCOSE BLOOD TEST: CPT

## 2022-10-12 PROCEDURE — 6360000002 HC RX W HCPCS: Performed by: ANESTHESIOLOGY

## 2022-10-12 PROCEDURE — 7100000010 HC PHASE II RECOVERY - FIRST 15 MIN: Performed by: ANESTHESIOLOGY

## 2022-10-12 PROCEDURE — 2500000003 HC RX 250 WO HCPCS: Performed by: ANESTHESIOLOGY

## 2022-10-12 PROCEDURE — 2580000003 HC RX 258: Performed by: ANESTHESIOLOGY

## 2022-10-12 RX ORDER — SODIUM CHLORIDE, SODIUM LACTATE, POTASSIUM CHLORIDE, CALCIUM CHLORIDE 600; 310; 30; 20 MG/100ML; MG/100ML; MG/100ML; MG/100ML
INJECTION, SOLUTION INTRAVENOUS CONTINUOUS
Status: DISCONTINUED | OUTPATIENT
Start: 2022-10-12 | End: 2022-10-12 | Stop reason: HOSPADM

## 2022-10-12 RX ORDER — MIDAZOLAM HYDROCHLORIDE 1 MG/ML
INJECTION INTRAMUSCULAR; INTRAVENOUS PRN
Status: DISCONTINUED | OUTPATIENT
Start: 2022-10-12 | End: 2022-10-12 | Stop reason: SDUPTHER

## 2022-10-12 RX ORDER — FENTANYL CITRATE 50 UG/ML
INJECTION, SOLUTION INTRAMUSCULAR; INTRAVENOUS PRN
Status: DISCONTINUED | OUTPATIENT
Start: 2022-10-12 | End: 2022-10-12 | Stop reason: SDUPTHER

## 2022-10-12 RX ORDER — LIDOCAINE HYDROCHLORIDE 10 MG/ML
INJECTION, SOLUTION EPIDURAL; INFILTRATION; INTRACAUDAL; PERINEURAL PRN
Status: DISCONTINUED | OUTPATIENT
Start: 2022-10-12 | End: 2022-10-12 | Stop reason: ALTCHOICE

## 2022-10-12 RX ORDER — ONDANSETRON 2 MG/ML
INJECTION INTRAMUSCULAR; INTRAVENOUS PRN
Status: DISCONTINUED | OUTPATIENT
Start: 2022-10-12 | End: 2022-10-12 | Stop reason: SDUPTHER

## 2022-10-12 RX ORDER — PROPOFOL 10 MG/ML
INJECTION, EMULSION INTRAVENOUS PRN
Status: DISCONTINUED | OUTPATIENT
Start: 2022-10-12 | End: 2022-10-12 | Stop reason: SDUPTHER

## 2022-10-12 RX ADMIN — MIDAZOLAM 2 MG: 1 INJECTION INTRAMUSCULAR; INTRAVENOUS at 10:15

## 2022-10-12 RX ADMIN — FENTANYL CITRATE 50 MCG: 50 INJECTION INTRAMUSCULAR; INTRAVENOUS at 10:18

## 2022-10-12 RX ADMIN — PROPOFOL 30 MG: 10 INJECTION, EMULSION INTRAVENOUS at 10:20

## 2022-10-12 RX ADMIN — SODIUM CHLORIDE, POTASSIUM CHLORIDE, SODIUM LACTATE AND CALCIUM CHLORIDE: 600; 310; 30; 20 INJECTION, SOLUTION INTRAVENOUS at 08:27

## 2022-10-12 RX ADMIN — PROPOFOL 20 MG: 10 INJECTION, EMULSION INTRAVENOUS at 10:16

## 2022-10-12 RX ADMIN — ONDANSETRON 4 MG: 2 INJECTION INTRAMUSCULAR; INTRAVENOUS at 10:24

## 2022-10-12 ASSESSMENT — PAIN DESCRIPTION - DESCRIPTORS: DESCRIPTORS: ACHING

## 2022-10-12 ASSESSMENT — PAIN - FUNCTIONAL ASSESSMENT: PAIN_FUNCTIONAL_ASSESSMENT: 0-10

## 2022-10-12 NOTE — H&P
Update History & Physical    The patient's History and Physical of 10/03/2022 was reviewed with the patient and there were no significant changes. I examined the patient and there were no significant changes from the previous History and Physical.    Plan: The risk, benefits, expected outcome, and alternative to the recommended procedure have been discussed with the patient. Patient understands and wants to proceed with the procedure.       Electronically signed by Melanie Sutton DO on 10/12/22 at 8:02 AM EDT

## 2022-10-12 NOTE — DISCHARGE INSTRUCTIONS
Post-op instruction Block  Dr. Arianne Whatley  [phone] Obduliaella Yao  [phone] [facility])      Rest 12-24 hours following procedure. DO NOT DRIVE till the following day. Keep dressing clean and dry. Do not bathe, shower, or sit in hot tub the day of procedure . You may remove the dressing following A.M. You may return to work/school tomorrow. Drink extra fluids for the next 24 hours. Resume your regular diet.    (ONLY IF TAKING)-Resume previously prescribed medications. Resume Coumadin, Plavix, NSAIDS, Ibuprofen products 24 hours after procedure. You need to have a responsible adult stay with you this evening. If you experience any discomfort relating to this procedure, you may take Tylenol 2 tablets every 4 hrs as needed for pain and/or apply ice to the affected area. If you experience any unusual symptoms or problems, call your physicians answering service at [phone] or go directly to the nearest Emergency Department. 10. If you are diabetic, your blood sugar may be elevated for several days from         Medication used during the procedure. 11. If you do not have any further procedures scheduled, please call for a follow           up appointment at [address] [place] [address]. [phone]            Nausea and Vomiting After Surgery: Care Instructions  Your Care Instructions     After you've had surgery, you may feel sick to your stomach (nauseated) or you may vomit. Sometimes anesthesia can make you feel sick. It's a common side effect and often doesn't last long. Pain also can make you feel sick or vomit. After the anesthesia wears off, you may feel pain from the incision (cut). That pain could then upset your stomach. Taking pain medicine can also make you feel sick to your stomach. Whatever the cause, you may get medicine that can help. There are also some things you can do at home to prevent nausea and feel better.   The doctor has checked you carefully, but problems can develop later. If you notice any problems or new symptoms, get medical treatment right away. Follow-up care is a key part of your treatment and safety. Be sure to make and go to all appointments, and call your doctor if you are having problems. It's also a good idea to know your test results and keep a list of the medicines you take. How can you care for yourself at home? Be safe with medicines. Read and follow all instructions on the label. If the doctor gave you a prescription medicine for pain, take it as prescribed. If you are not taking a prescription pain medicine, ask your doctor if you can take an over-the-counter medicine. Take your pain medicine as soon as you have pain. It works better if you take it before the pain gets bad. Call your doctor if you have any problems with your medicine. Rest in bed until you feel better. To prevent dehydration, drink plenty of fluids. Choose water and other clear liquids until you feel better. If you have kidney, heart, or liver disease and have to limit fluids, talk with your doctor before you increase the amount of fluids you drink. When you are able to eat, try clear soups, mild foods, and liquids until all symptoms are gone for 12 to 48 hours. Other good choices include dry toast, crackers, cooked cereal, and gelatin dessert, such as Jell-O. Do not smoke. Smoking and being around smoke can make nausea worse. If you need help quitting, talk to your doctor about stop-smoking programs and medicines. These can increase your chances of quitting for good. When should you call for help? Call 911  anytime you think you may need emergency care. For example, call if:    You passed out (lost consciousness). Call your doctor now or seek immediate medical care if:    You have new or worse nausea or vomiting. You are too sick to your stomach to drink any fluids. You cannot keep down fluids.      You have symptoms of dehydration, such as:  Dry eyes and a dry mouth. Passing only a little urine. Feeling thirstier than usual.     Your pain medicine is not helping. You are dizzy or lightheaded, or you feel like you may faint. Watch closely for changes in your health, and be sure to contact your doctor if:    You do not get better as expected. Current as of: March 9, 2022               Content Version: 13.4  © 2006-2022 [employer]. Care instructions adapted under license by [facility] ([facility]). If you have questions about a medical condition or this instruction, always ask your healthcare professional. [address] any warranty or liability for your use of this information. Infection After Surgery: Care Instructions  Overview  After surgery, an infection is always possible. It doesn't mean that the surgery didn't go well. Because an infection can be serious, your doctor has taken steps to manage it. Your doctor checked the infection and cleaned it if necessary. Your doctor may have made an opening in the area so that the pus can drain out. You may have gauze in the cut so that the area will stay open and keep draining. You may need antibiotics. You will need to follow up with your doctor to make sure the infection has gone away. Follow-up care is a key part of your treatment and safety. Be sure to make and go to all appointments, and call your doctor if you are having problems. It's also a good idea to know your test results and keep a list of the medicines you take. How can you care for yourself at home? Make sure your surgeon knows about the infection, especially if you saw another doctor about your symptoms. If your doctor prescribed antibiotics, take them as directed. Do not stop taking them just because you feel better. You need to take the full course of antibiotics. Ask your doctor if you can take an over-the-counter pain medicine, such as acetaminophen (Tylenol), ibuprofen (Advil, Motrin), or naproxen (Aleve). Be safe with medicines. Read and follow all instructions on the label. Do not take two or more pain medicines at the same time unless the doctor told you to. Many pain medicines have acetaminophen, which is Tylenol. Too much acetaminophen (Tylenol) can be harmful. Prop up the area on a pillow anytime you sit or lie down during the next 3 days. Try to keep it above the level of your heart. This will help reduce swelling. Keep the skin clean and dry. You may have a bandage over the cut (incision). A bandage helps the incision heal and protects it. Your doctor will tell you how to take care of this. Keep it clean and dry. You may have drainage from the wound. If your doctor told you how to care for your incision, follow your doctor's instructions. If you did not get instructions, follow this general advice:  Wash around the incision with clean water 2 times a day. Don't use hydrogen peroxide or alcohol, which can slow healing. When should you call for help? Call your doctor now or seek immediate medical care if:    You have signs that your infection is getting worse, such as: Increased pain, swelling, warmth, or redness in the area. Red streaks leading from the area. Pus draining from the wound. A new or higher fever. Watch closely for changes in your health, and be sure to contact your doctor if you have any problems. Where can you learn more? Go to https://Preventes.frpeacaciaOmniStrat.Fortuna Vini. org and sign in to your [employer] account. Enter C340 in the [facility] box to learn more about \"Infection After Surgery: Care Instructions. \"     If you do not have an account, please click on the \"Sign Up Now\" link. Current as of: January 20, 2022               Content Version: 13.4  © 0235-3560 Healthwise, [employer]. Care instructions adapted under license by [facility] ([facility]).  If you have questions about a medical condition or this instruction, always ask your healthcare professional. [employer] disclaims any warranty or liability for your use of this information.

## 2022-10-12 NOTE — ANESTHESIA POSTPROCEDURE EVALUATION
Department of Anesthesiology  Postprocedure Note    Patient: Angelika Suarez  MRN: [de-identified]  YOB: 1960  Date of evaluation: 10/12/2022      Procedure Summary     Date: 10/12/22 Room / Location: [address] [place] [address] 04 / Jeannette Max [facility]    Anesthesia Start: 4833 Anesthesia Stop: 2788    Procedure: RIGHT PIRIFORMIS INJECTION UNDER XRAY  (CPT 87987) (Right) Diagnosis:       Piriformis syndrome of right side      (Piriformis syndrome of right side [G57.01])    Surgeons: Renetta Bender DO Responsible Provider: Marisela Christopher MD    Anesthesia Type: MAC ASA Status: 3          Anesthesia Type: MAC    Jp Phase I: Jp Score: 10    Jp Phase II: Jp Score: 9      Anesthesia Post Evaluation    Patient location during evaluation: bedside  Patient participation: complete - patient participated  Level of consciousness: awake  Pain score: 0  Airway patency: patent  Nausea & Vomiting: no nausea and no vomiting  Complications: no  Cardiovascular status: hemodynamically stable  Respiratory status: acceptable  Hydration status: euvolemic

## 2022-10-12 NOTE — OP NOTE
Pre-Op Diagnosis:  Piriformis Syndrome    Post-Op Diagnosis: Same     Procedure Performed: Therapeutic and diagnostic Right piriformis muscle injection under direct fluoroscopic guidance # 1    Anesthesia: Local with monitored anesthesia care    Surgeon: Veatrice Cowden    BRIEF HISTORY: Niesha Amos comes in today, 10/12/2022, to the [facility] for a #1, right piriformis muscle injection. She was again explained this procedure in detail, including potential complications and did again request we proceed. [place] complete History & Physical examination was reviewed with her in depth. It is unchanged. Curt Corea has had chronic pain in the right piriformis muscle area despite multiple attempts at conservative outpatient medical management including physical therapy, NSAID, mild-to-moderate analgesics, muscle relaxants, TENS, manipulation, etc.     Physical examination shows the patient to have dramatic muscle spasm with palpation directly over this area easily reproducing her pain. PROCEDURE:  Curt Corea was brought to the procedure area of the [facility] and placed in the prone position. The right piriformis muscle was prepped and draped  in the usual manner. A time-out was performed and confirmed the patients name, date of birth, the procedure to be performed and skin marked for appropriate site and side of procedure. All questions and concerns were answered and the patient requested we proceed. Following this with fluoroscopic guidance with a medial to lateral approach, we were able to identify the right piriformis muscle and then placed Lidocaine 1% 5 cc for the local skin wheal, followed by a #22-gauge 3 ½ inch disposable BD spinal needle down into the piriformis muscle itself under direct fluoroscopic guidance.  The needle was repositioned to make sure we infiltrated a number of different approached into the same along this course, including a total injection of 9.5 cc of 0.25% Bupivacaine mixed with DepoMedrol 40 mg per cc and equally distributed as above. The needle was then removed together and intact without difficulty. She tolerated the procedure well and was in satisfactory condition at the end of the procedure in excellent pain relief. Renato Quevedo was given discharge instruction.  She was instructed to call the office if having any questions or concerns     Signed Electronically byDr. Martín Castillo

## 2022-10-25 NOTE — DISCHARGE INSTRUCTIONS
POST-ARTHROSCOPY INSTRUCTIONS  Lorene Lombardi DO  Department of Orthopaedic Surgery  [address] [place] [address]. [facility] Jozef  [phone]    1. You may shower in 72 hours. Remove all of your bandages after you shower dry off sutures and apply on band-aid over each suture site. Wrap one ace bandage around the knee to be removed only for application of ice compress. Depending on the surgery you had, you may have been given a white stocking, if so, continue to this instead of the ace bandage. 2. Expect some swelling, discomfort, and bruising. This is normal. The swelling can last from several days to several weeks depending on your surgery. Ways to lessen the amount of pain and swelling:  REST  Use ICE first 48-72 hours. If using regular ice, then apply for 20-30 minutes, 4 x a day. Ace compression bandage  Elevate the limb above the level of the heart. 3. Pain medication-take as prescribed to help with post op pain. Stop pain medication when you feel comfortable. In most cases, pain medication will only be prescribed for 6-8 weeks to treat acute post-operative pain and not for chronic pain issues. Other medications you may take for breakthrough pain over the counter:  Tylenol 500mg. (extra strength) two tablets every 3-4 hours. Advil 200mg two tablets every 4 hours with food. 4. Pain medication may cause constipation. If you were prescribed Colace, which should help with this. Drink plenty of fluids. If you do not have a bowel movement within 5 days then drink ½ a bottle of magnesium citrate, which is sold over the counter. 5. If you had knee surgery, you may discard the crutches when you feel comfortable walking without them, unless otherwise instructed. (e.g. ACL surgery or meniscal REPAIR). 6. Sleeping may be difficult for the first few weeks, pain tends to increase at night, this is normal. Use the medication and ice.     7. Although the risk for infection is very low, notify the office if you develop a fever greater than 101? with increased pain, redness, and warmth. Report any pus or unusual drainage immediately to office. 8. Blood clots are rare after arthroscopic surgery, as a precaution please take 325 mg. of aspirin a day for 14 days after surgery. If you develop unusual swelling call office. 9. Call the office [phone] to schedule follow up visit for 7-14 days after surgery. Please do this today or tomorrow. ____________________________________________________________________  (signature patient/responsible adult )        Physical Therapy Listing    Agency     Address      Phone    Fax    [address] [facility]        [address], [facility]adel[facility]    [address] [facility] [address]     [phone]   [facility] [address] [facility], [address] [place] [address]    [facility] Office      [facility] Office     [address] [facility], [facility] [place] [address] Antoinette Golden    [address] [place] [address] Dr Mitchell, [facility] 46     [phone]      [address] [facility] 17[address] [facility] [place], [address] [phone]   [address] [phone]   [facility] 66 [facility]. [place], [address] [phone]    [facility] [address] [facility] Office      [place] Office      [facility]-  [address]     [address]  [address] [facility] 210      [address] [facility]     [phone]            [address] [facility] [address]. [address] [place], [place] [address]    [facility]   [phone]        [phone]   [facility] Physical Therapy [address], [address]   [facility] [address], [id number] [address] [place] [address] [facility] Ueusffso- [address] [facility] [address] [facility] [address] Office     [address], [place]    [address] Jump, [facility]   [address]   [address] 7502 [facility], [address] [phone]   [facility] [address], [facility] [phone] [address] [facility] Office ([address] [place] [address])      [place] Office     [address] [place], [address]    [address] [facility], [facility]    [address] [facility], [address] 3     [phone]      [phone]        [phone]     [phone]      [phone]        [address] [facility] [address] [facility] Rehabilitation [address] [facility] [phone]    [employer] 175 [facility], [address] [place] [address] [phone]   Physical Therapy and Sports Medicine [address] [facility] [phone]    [facility] Physical Therapy-  [place] Office      [address] Office     [address], [place], [place]      [address] Anjana Jacqui, [address] [place] [address]       [phone]        [address]                       309 [facility]. [address] R[address] thompson, [address] [phone]

## 2022-11-10 ENCOUNTER — OFFICE VISIT (OUTPATIENT)
Dept: NEUROSURGERY | Age: 62
End: 2022-11-10
Payer: MEDICARE

## 2022-11-10 VITALS
SYSTOLIC BLOOD PRESSURE: 145 MMHG | WEIGHT: 284 LBS | HEART RATE: 84 BPM | HEIGHT: 66 IN | OXYGEN SATURATION: 97 % | BODY MASS INDEX: 45.64 KG/M2 | DIASTOLIC BLOOD PRESSURE: 94 MMHG

## 2022-11-10 DIAGNOSIS — G96.191 PERINEURAL CYST: ICD-10-CM

## 2022-11-10 DIAGNOSIS — M54.16 LUMBAR RADICULOPATHY: ICD-10-CM

## 2022-11-10 DIAGNOSIS — M54.41 ACUTE RIGHT-SIDED LOW BACK PAIN WITH RIGHT-SIDED SCIATICA: Primary | ICD-10-CM

## 2022-11-10 PROCEDURE — 99202 OFFICE O/P NEW SF 15 MIN: CPT

## 2022-11-10 PROCEDURE — 99203 OFFICE O/P NEW LOW 30 MIN: CPT | Performed by: STUDENT IN AN ORGANIZED HEALTH CARE EDUCATION/TRAINING PROGRAM

## 2022-11-10 ASSESSMENT — ENCOUNTER SYMPTOMS
PHOTOPHOBIA: 0
TROUBLE SWALLOWING: 0
BACK PAIN: 1
ABDOMINAL PAIN: 0
SHORTNESS OF BREATH: 0

## 2022-11-10 NOTE — PROGRESS NOTES
Subjective:      Patient ID: Génesis Manuel is a 60 y.o. female who has a previous history of a L4-L5 laminectomy who presents for evaluation of back pain and review MRI. Patient states she has always had back pain but states she had a recent fall a few months ago and it has aggravated her chronic back pain. She describes this pain as a pinching pain that radiates down her right leg. She admits to some numbness with this pain. No associated weakness. Movement makes the pain worse. She has tried PT in the past with some relief. She currently follows with Dr. Jose Engle for [facility] and ablation which provide significant relief. She is a former smoker and denies any blood thinner usage. MRI reviewed with patient. Review of Systems   Constitutional:  Negative for chills and fever. HENT:  Negative for trouble swallowing. Eyes:  Negative for photophobia. Respiratory:  Negative for shortness of breath. Cardiovascular:  Negative for chest pain. Gastrointestinal:  Negative for abdominal pain. Endocrine: Negative for heat intolerance. Genitourinary:  Negative for difficulty urinating and flank pain. Musculoskeletal:  Positive for arthralgias and back pain. Negative for myalgias. Skin:  Negative for wound. Neurological:  Positive for numbness. Negative for weakness and headaches. Psychiatric/Behavioral:  Negative for confusion. Objective:   Physical Exam  HENT:      Head: Normocephalic. Eyes:      Pupils: Pupils are equal, round, and reactive to light. Cardiovascular:      Rate and Rhythm: Normal rate. Pulmonary:      Effort: Pulmonary effort is normal.   Abdominal:      General: There is no distension. Musculoskeletal:         General: Normal range of motion. Cervical back: Normal range of motion. Comments: Mild Tenderness to palpation of bilateral knees   Skin:     General: Skin is warm and dry. Neurological:      Mental Status: She is alert.       Comments: A&Ox3  CN3-12 intact  Motor Strength full   Sensation intact to light touch   Reflexes normal   (-)Bilateral Straight Leg test  Nontender to palpation of lumbar spine   Psychiatric:         Thought Content: Thought content normal.     Imaging: 10/10/2022 MRI Lumbar Spine from [place] open MRI  Mild to moderate degenerative disc disease of lumbar spine with variable foraminal stenosis. Minuscule right S1 perineural cyst.     Assessment:      -Brianna Miller is a 59 y/o female who presents with low back pain with radiation down right leg. She is currently following with Dr. Tracie Anglin for pain. PT in the past. MRI as above.        Plan:      -Pain control and expectations discussed  -MRI reviewed and discussed in detail  -Patient does not want to pursue any surgical intervention at this time, Continue with conservative measures  -OARRS reviewed  -Follow up in clinic if conservative measures fail  -Call/return sooner if symptoms worsen or new issues arise in the interim           Pao Contreras PA-C

## 2022-11-11 RX ORDER — MULTIVIT-MIN/IRON/FOLIC ACID/K 18-600-40
CAPSULE ORAL
COMMUNITY

## 2022-11-11 RX ORDER — ZINC GLUCONATE 50 MG
50 TABLET ORAL DAILY
COMMUNITY

## 2022-11-11 NOTE — PROGRESS NOTES
[facility] [facility]                                                                                                                    PRE OP INSTRUCTIONS FOR  Cornelius Saldana        Date: 11/11/2022    Date of surgery: 11/14/22   Arrival Time: Hospital will call you between 5pm and 7pm with your final arrival time for surgery    Do not eat or drink anything after midnight prior to surgery. This includes no water, chewing gum, mints or ice chips. Take the following medications with a small sip of water on the morning of Surgery: MS Contin, omeprazole     Diabetics may take evening dose of insulin but none after midnight. If you feel symptomatic or low blood sugar morning of surgery drink 1-2 ounces of apple juice only. Aspirin, Ibuprofen, Advil, Naproxen, Vitamin E and other Anti-inflammatory products should be stopped  before surgery  as directed by your physician. Take Tylenol only unless instructed otherwise by your surgeon. Check with your Doctor regarding stopping Plavix, Coumadin, Lovenox, Eliquis, Effient, or other blood thinners. Do not smoke,use illicit drugs and do not drink any alcoholic beverages 24 hours prior to surgery. You may brush your teeth the morning of surgery. DO NOT SWALLOW WATER    You MUST make arrangements for a responsible adult to take you home after your surgery. You will not be allowed to leave alone or drive yourself home. It is strongly suggested someone stay with you the first 24 hrs. Your surgery will be cancelled if you do not have a ride home. PEDIATRIC PATIENTS ONLY:  A parent/legal guardian must accompany a child scheduled for surgery and plan to stay at the hospital until the child is discharged. Please do not bring other children with you.     Please wear simple, loose fitting clothing to the hospital.  Kelley Hobson not bring valuables (money, credit cards, checkbooks, etc.) Do not wear any makeup (including no eye makeup) or nail polish on your fingers or toes. DO NOT wear any jewelry or piercings on day of surgery. All body piercing jewelry must be removed. Shower the night before surgery with _x__Antibacterial soap /RODNEY WIPES________    TOTAL JOINT REPLACEMENT/HYSTERECTOMY PATIENTS ONLY---Remember to bring Blood Bank bracelet to the hospital on the day of surgery. If you have a Living Will and Durable Power of  for Healthcare, please bring in a copy. If appropriate bring crutches, inspirex, WALKER, CANE etc... Notify your Surgeon if you develop any illness between now and surgery time, cough, cold, fever, sore throat, nausea, vomiting, etc.  Please notify your surgeon if you experience dizziness, shortness of breath or blurred vision between now & the time of your surgery. If you have ___dentures, they will be removed before going to the OR; we will provide you a container. If you wear ___contact lenses or _x__glasses, they will be removed; please bring a case for them. To provide excellent care visitors will be limited to 2 in the room at any given time. Please bring picture ID and insurance card. Sleep apnea patients need to bring CPAP AND SETTINGS to hospital on day of surgery. During flu season no children under the age of 15 are permitted in the hospital for the safety of all patients. Other                   Please call AMBULATORY CARE if you have any further questions.    [address] [facility]     [address]

## 2022-11-14 ENCOUNTER — ANESTHESIA (OUTPATIENT)
Dept: OPERATING ROOM | Age: 62
End: 2022-11-14
Payer: MEDICARE

## 2022-11-14 ENCOUNTER — HOSPITAL ENCOUNTER (OUTPATIENT)
Age: 62
Setting detail: OUTPATIENT SURGERY
Discharge: HOME | End: 2022-11-14
Attending: ORTHOPAEDIC SURGERY | Admitting: ORTHOPAEDIC SURGERY
Payer: MEDICARE

## 2022-11-14 ENCOUNTER — ANESTHESIA EVENT (OUTPATIENT)
Dept: OPERATING ROOM | Age: 62
End: 2022-11-14
Payer: MEDICARE

## 2022-11-14 VITALS
SYSTOLIC BLOOD PRESSURE: 128 MMHG | HEART RATE: 81 BPM | OXYGEN SATURATION: 92 % | TEMPERATURE: 96.8 F | WEIGHT: 284 LBS | HEIGHT: 66 IN | BODY MASS INDEX: 45.64 KG/M2 | DIASTOLIC BLOOD PRESSURE: 59 MMHG | RESPIRATION RATE: 16 BRPM

## 2022-11-14 DIAGNOSIS — M22.2X1 PATELLOFEMORAL DISORDER OF RIGHT KNEE: ICD-10-CM

## 2022-11-14 DIAGNOSIS — Z98.890 S/P ARTHROSCOPY OF RIGHT KNEE: Primary | ICD-10-CM

## 2022-11-14 LAB
ANION GAP SERPL CALCULATED.3IONS-SCNC: 13 MMOL/L (ref 7–16)
BUN BLDV-MCNC: 12 MG/DL (ref 6–23)
CALCIUM SERPL-MCNC: 9.5 MG/DL (ref 8.6–10.2)
CHLORIDE BLD-SCNC: 105 MMOL/L (ref 98–107)
CO2: 23 MMOL/L (ref 22–29)
CREAT SERPL-MCNC: 0.7 MG/DL (ref 0.5–1)
EKG ATRIAL RATE: 72 BPM
EKG P AXIS: 31 DEGREES
EKG P-R INTERVAL: 170 MS
EKG Q-T INTERVAL: 406 MS
EKG QRS DURATION: 82 MS
EKG QTC CALCULATION (BAZETT): 444 MS
EKG R AXIS: 32 DEGREES
EKG T AXIS: 66 DEGREES
EKG VENTRICULAR RATE: 72 BPM
GFR SERPL CREATININE-BSD FRML MDRD: >60 ML/MIN/1.73
GLUCOSE BLD-MCNC: 93 MG/DL (ref 74–99)
HCT VFR BLD CALC: 39.4 % (ref 34–48)
HEMOGLOBIN: 11.9 G/DL (ref 11.5–15.5)
MCH RBC QN AUTO: 24.2 PG (ref 26–35)
MCHC RBC AUTO-ENTMCNC: 30.2 % (ref 32–34.5)
MCV RBC AUTO: 80.1 FL (ref 80–99.9)
PDW BLD-RTO: 14.6 FL (ref 11.5–15)
PLATELET # BLD: 267 E9/L (ref 130–450)
PMV BLD AUTO: 10.7 FL (ref 7–12)
POTASSIUM REFLEX MAGNESIUM: 4.9 MMOL/L (ref 3.5–5)
RBC # BLD: 4.92 E12/L (ref 3.5–5.5)
SODIUM BLD-SCNC: 141 MMOL/L (ref 132–146)
WBC # BLD: 5.7 E9/L (ref 4.5–11.5)

## 2022-11-14 PROCEDURE — 29876 ARTHRS KNEE SURG SYNVCT MAJ: CPT | Performed by: ORTHOPAEDIC SURGERY

## 2022-11-14 PROCEDURE — 2580000003 HC RX 258: Performed by: ORTHOPAEDIC SURGERY

## 2022-11-14 PROCEDURE — 6360000002 HC RX W HCPCS: Performed by: ANESTHESIOLOGY

## 2022-11-14 PROCEDURE — 7100000001 HC PACU RECOVERY - ADDTL 15 MIN: Performed by: ORTHOPAEDIC SURGERY

## 2022-11-14 PROCEDURE — 36415 COLL VENOUS BLD VENIPUNCTURE: CPT

## 2022-11-14 PROCEDURE — 6360000002 HC RX W HCPCS: Performed by: ORTHOPAEDIC SURGERY

## 2022-11-14 PROCEDURE — 6360000002 HC RX W HCPCS: Performed by: NURSE ANESTHETIST, CERTIFIED REGISTERED

## 2022-11-14 PROCEDURE — 7100000011 HC PHASE II RECOVERY - ADDTL 15 MIN: Performed by: ORTHOPAEDIC SURGERY

## 2022-11-14 PROCEDURE — 2720000010 HC SURG SUPPLY STERILE: Performed by: ORTHOPAEDIC SURGERY

## 2022-11-14 PROCEDURE — 2709999900 HC NON-CHARGEABLE SUPPLY: Performed by: ORTHOPAEDIC SURGERY

## 2022-11-14 PROCEDURE — 2500000003 HC RX 250 WO HCPCS: Performed by: ORTHOPAEDIC SURGERY

## 2022-11-14 PROCEDURE — 6360000002 HC RX W HCPCS

## 2022-11-14 PROCEDURE — 6370000000 HC RX 637 (ALT 250 FOR IP): Performed by: ANESTHESIOLOGY

## 2022-11-14 PROCEDURE — 7100000010 HC PHASE II RECOVERY - FIRST 15 MIN: Performed by: ORTHOPAEDIC SURGERY

## 2022-11-14 PROCEDURE — 3700000000 HC ANESTHESIA ATTENDED CARE: Performed by: ORTHOPAEDIC SURGERY

## 2022-11-14 PROCEDURE — 3600000013 HC SURGERY LEVEL 3 ADDTL 15MIN: Performed by: ORTHOPAEDIC SURGERY

## 2022-11-14 PROCEDURE — 3600000003 HC SURGERY LEVEL 3 BASE: Performed by: ORTHOPAEDIC SURGERY

## 2022-11-14 PROCEDURE — 3700000001 HC ADD 15 MINUTES (ANESTHESIA): Performed by: ORTHOPAEDIC SURGERY

## 2022-11-14 PROCEDURE — 2580000003 HC RX 258: Performed by: ANESTHESIOLOGY

## 2022-11-14 PROCEDURE — 85027 COMPLETE CBC AUTOMATED: CPT

## 2022-11-14 PROCEDURE — 7100000000 HC PACU RECOVERY - FIRST 15 MIN: Performed by: ORTHOPAEDIC SURGERY

## 2022-11-14 PROCEDURE — 80048 BASIC METABOLIC PNL TOTAL CA: CPT

## 2022-11-14 RX ORDER — OXYCODONE HYDROCHLORIDE AND ACETAMINOPHEN 5; 325 MG/1; MG/1
1 TABLET ORAL EVERY 6 HOURS PRN
Qty: 28 TABLET | Refills: 0 | Status: SHIPPED | OUTPATIENT
Start: 2022-11-14 | End: 2022-11-21

## 2022-11-14 RX ORDER — SODIUM CHLORIDE 0.9 % (FLUSH) 0.9 %
5-40 SYRINGE (ML) INJECTION PRN
Status: DISCONTINUED | OUTPATIENT
Start: 2022-11-14 | End: 2022-11-14 | Stop reason: HOSPADM

## 2022-11-14 RX ORDER — DIPHENHYDRAMINE HYDROCHLORIDE 50 MG/ML
12.5 INJECTION INTRAMUSCULAR; INTRAVENOUS
Status: DISCONTINUED | OUTPATIENT
Start: 2022-11-14 | End: 2022-11-14 | Stop reason: HOSPADM

## 2022-11-14 RX ORDER — ONDANSETRON 2 MG/ML
INJECTION INTRAMUSCULAR; INTRAVENOUS PRN
Status: DISCONTINUED | OUTPATIENT
Start: 2022-11-14 | End: 2022-11-14 | Stop reason: SDUPTHER

## 2022-11-14 RX ORDER — ASPIRIN 325 MG
325 TABLET, DELAYED RELEASE (ENTERIC COATED) ORAL DAILY
Qty: 14 TABLET | Refills: 0 | Status: SHIPPED | OUTPATIENT
Start: 2022-11-14 | End: 2022-11-28

## 2022-11-14 RX ORDER — BUPIVACAINE HYDROCHLORIDE AND EPINEPHRINE 2.5; 5 MG/ML; UG/ML
INJECTION, SOLUTION EPIDURAL; INFILTRATION; INTRACAUDAL; PERINEURAL PRN
Status: DISCONTINUED | OUTPATIENT
Start: 2022-11-14 | End: 2022-11-14 | Stop reason: ALTCHOICE

## 2022-11-14 RX ORDER — MEPERIDINE HYDROCHLORIDE 50 MG/ML
INJECTION INTRAMUSCULAR; INTRAVENOUS; SUBCUTANEOUS
Status: COMPLETED
Start: 2022-11-14 | End: 2022-11-14

## 2022-11-14 RX ORDER — FENTANYL CITRATE 50 UG/ML
INJECTION, SOLUTION INTRAMUSCULAR; INTRAVENOUS PRN
Status: DISCONTINUED | OUTPATIENT
Start: 2022-11-14 | End: 2022-11-14 | Stop reason: SDUPTHER

## 2022-11-14 RX ORDER — DEXAMETHASONE SODIUM PHOSPHATE 10 MG/ML
INJECTION, SOLUTION INTRAMUSCULAR; INTRAVENOUS PRN
Status: DISCONTINUED | OUTPATIENT
Start: 2022-11-14 | End: 2022-11-14 | Stop reason: SDUPTHER

## 2022-11-14 RX ORDER — PROPOFOL 10 MG/ML
INJECTION, EMULSION INTRAVENOUS PRN
Status: DISCONTINUED | OUTPATIENT
Start: 2022-11-14 | End: 2022-11-14 | Stop reason: SDUPTHER

## 2022-11-14 RX ORDER — MEPERIDINE HYDROCHLORIDE 50 MG/ML
50 INJECTION INTRAMUSCULAR; INTRAVENOUS; SUBCUTANEOUS ONCE
Status: COMPLETED | OUTPATIENT
Start: 2022-11-14 | End: 2022-11-14

## 2022-11-14 RX ORDER — EPINEPHRINE 1 MG/ML
INJECTION, SOLUTION, CONCENTRATE INTRAVENOUS PRN
Status: DISCONTINUED | OUTPATIENT
Start: 2022-11-14 | End: 2022-11-14 | Stop reason: ALTCHOICE

## 2022-11-14 RX ORDER — MIDAZOLAM HYDROCHLORIDE 1 MG/ML
INJECTION INTRAMUSCULAR; INTRAVENOUS PRN
Status: DISCONTINUED | OUTPATIENT
Start: 2022-11-14 | End: 2022-11-14 | Stop reason: SDUPTHER

## 2022-11-14 RX ORDER — OXYCODONE HYDROCHLORIDE 5 MG/1
5 TABLET ORAL
Status: COMPLETED | OUTPATIENT
Start: 2022-11-14 | End: 2022-11-14

## 2022-11-14 RX ORDER — IPRATROPIUM BROMIDE AND ALBUTEROL SULFATE 2.5; .5 MG/3ML; MG/3ML
1 SOLUTION RESPIRATORY (INHALATION)
Status: DISCONTINUED | OUTPATIENT
Start: 2022-11-14 | End: 2022-11-14 | Stop reason: HOSPADM

## 2022-11-14 RX ORDER — MEPERIDINE HYDROCHLORIDE 25 MG/ML
INJECTION INTRAMUSCULAR; INTRAVENOUS; SUBCUTANEOUS
Status: COMPLETED
Start: 2022-11-14 | End: 2022-11-14

## 2022-11-14 RX ORDER — MIDAZOLAM HYDROCHLORIDE 1 MG/ML
2 INJECTION INTRAMUSCULAR; INTRAVENOUS
Status: DISCONTINUED | OUTPATIENT
Start: 2022-11-14 | End: 2022-11-14 | Stop reason: HOSPADM

## 2022-11-14 RX ORDER — MEPERIDINE HYDROCHLORIDE 25 MG/ML
12.5 INJECTION INTRAMUSCULAR; INTRAVENOUS; SUBCUTANEOUS EVERY 5 MIN PRN
Status: DISCONTINUED | OUTPATIENT
Start: 2022-11-14 | End: 2022-11-14 | Stop reason: HOSPADM

## 2022-11-14 RX ORDER — LIDOCAINE HYDROCHLORIDE 20 MG/ML
INJECTION, SOLUTION INTRAVENOUS PRN
Status: DISCONTINUED | OUTPATIENT
Start: 2022-11-14 | End: 2022-11-14 | Stop reason: SDUPTHER

## 2022-11-14 RX ORDER — LABETALOL HYDROCHLORIDE 5 MG/ML
10 INJECTION, SOLUTION INTRAVENOUS
Status: DISCONTINUED | OUTPATIENT
Start: 2022-11-14 | End: 2022-11-14 | Stop reason: HOSPADM

## 2022-11-14 RX ORDER — KETOROLAC TROMETHAMINE 30 MG/ML
INJECTION, SOLUTION INTRAMUSCULAR; INTRAVENOUS
Status: COMPLETED
Start: 2022-11-14 | End: 2022-11-14

## 2022-11-14 RX ORDER — SODIUM CHLORIDE 9 MG/ML
INJECTION, SOLUTION INTRAVENOUS CONTINUOUS
Status: DISCONTINUED | OUTPATIENT
Start: 2022-11-14 | End: 2022-11-14 | Stop reason: HOSPADM

## 2022-11-14 RX ORDER — PROCHLORPERAZINE EDISYLATE 5 MG/ML
5 INJECTION INTRAMUSCULAR; INTRAVENOUS
Status: DISCONTINUED | OUTPATIENT
Start: 2022-11-14 | End: 2022-11-14 | Stop reason: HOSPADM

## 2022-11-14 RX ORDER — PROMETHAZINE HYDROCHLORIDE 25 MG/ML
25 INJECTION, SOLUTION INTRAMUSCULAR; INTRAVENOUS ONCE
Status: COMPLETED | OUTPATIENT
Start: 2022-11-14 | End: 2022-11-14

## 2022-11-14 RX ORDER — HYDRALAZINE HYDROCHLORIDE 20 MG/ML
10 INJECTION INTRAMUSCULAR; INTRAVENOUS
Status: DISCONTINUED | OUTPATIENT
Start: 2022-11-14 | End: 2022-11-14 | Stop reason: HOSPADM

## 2022-11-14 RX ORDER — SODIUM CHLORIDE 9 MG/ML
INJECTION, SOLUTION INTRAVENOUS PRN
Status: DISCONTINUED | OUTPATIENT
Start: 2022-11-14 | End: 2022-11-14 | Stop reason: HOSPADM

## 2022-11-14 RX ORDER — SODIUM CHLORIDE 0.9 % (FLUSH) 0.9 %
5-40 SYRINGE (ML) INJECTION EVERY 12 HOURS SCHEDULED
Status: DISCONTINUED | OUTPATIENT
Start: 2022-11-14 | End: 2022-11-14 | Stop reason: HOSPADM

## 2022-11-14 RX ORDER — DOCUSATE SODIUM 100 MG/1
100 CAPSULE, LIQUID FILLED ORAL 2 TIMES DAILY PRN
Qty: 40 CAPSULE | Refills: 1 | Status: SHIPPED | OUTPATIENT
Start: 2022-11-14

## 2022-11-14 RX ORDER — KETOROLAC TROMETHAMINE 30 MG/ML
30 INJECTION, SOLUTION INTRAMUSCULAR; INTRAVENOUS ONCE
Status: COMPLETED | OUTPATIENT
Start: 2022-11-14 | End: 2022-11-14

## 2022-11-14 RX ORDER — ONDANSETRON 4 MG/1
4 TABLET, FILM COATED ORAL EVERY 6 HOURS PRN
Qty: 20 TABLET | Refills: 1 | Status: SHIPPED | OUTPATIENT
Start: 2022-11-14

## 2022-11-14 RX ORDER — HALOPERIDOL 5 MG/ML
1 INJECTION INTRAMUSCULAR
Status: DISCONTINUED | OUTPATIENT
Start: 2022-11-14 | End: 2022-11-14 | Stop reason: HOSPADM

## 2022-11-14 RX ADMIN — KETOROLAC TROMETHAMINE 30 MG: 30 INJECTION, SOLUTION INTRAMUSCULAR at 12:11

## 2022-11-14 RX ADMIN — KETOROLAC TROMETHAMINE 30 MG: 30 INJECTION, SOLUTION INTRAMUSCULAR; INTRAVENOUS at 12:11

## 2022-11-14 RX ADMIN — FENTANYL CITRATE 50 MCG: 50 INJECTION, SOLUTION INTRAMUSCULAR; INTRAVENOUS at 10:27

## 2022-11-14 RX ADMIN — MEPERIDINE HYDROCHLORIDE 50 MG: 50 INJECTION INTRAMUSCULAR; INTRAVENOUS; SUBCUTANEOUS at 12:24

## 2022-11-14 RX ADMIN — Medication 0.25 MG: at 12:35

## 2022-11-14 RX ADMIN — ONDANSETRON 4 MG: 2 INJECTION INTRAMUSCULAR; INTRAVENOUS at 11:12

## 2022-11-14 RX ADMIN — OXYCODONE 5 MG: 5 TABLET ORAL at 13:26

## 2022-11-14 RX ADMIN — LIDOCAINE HYDROCHLORIDE 80 MG: 20 INJECTION, SOLUTION INTRAVENOUS at 10:27

## 2022-11-14 RX ADMIN — CEFAZOLIN 3000 MG: 10 INJECTION, POWDER, FOR SOLUTION INTRAVENOUS at 10:18

## 2022-11-14 RX ADMIN — MEPERIDINE HYDROCHLORIDE 12.5 MG: 25 INJECTION, SOLUTION INTRAMUSCULAR; INTRAVENOUS; SUBCUTANEOUS at 11:51

## 2022-11-14 RX ADMIN — FENTANYL CITRATE 50 MCG: 50 INJECTION, SOLUTION INTRAMUSCULAR; INTRAVENOUS at 10:52

## 2022-11-14 RX ADMIN — SODIUM CHLORIDE: 900 INJECTION, SOLUTION INTRAVENOUS at 10:18

## 2022-11-14 RX ADMIN — FENTANYL CITRATE 50 MCG: 50 INJECTION, SOLUTION INTRAMUSCULAR; INTRAVENOUS at 11:19

## 2022-11-14 RX ADMIN — MEPERIDINE HYDROCHLORIDE 50 MG: 50 INJECTION, SOLUTION INTRAMUSCULAR; INTRAVENOUS; SUBCUTANEOUS at 12:24

## 2022-11-14 RX ADMIN — FENTANYL CITRATE 50 MCG: 50 INJECTION, SOLUTION INTRAMUSCULAR; INTRAVENOUS at 10:49

## 2022-11-14 RX ADMIN — PROPOFOL 200 MG: 10 INJECTION, EMULSION INTRAVENOUS at 10:29

## 2022-11-14 RX ADMIN — Medication 0.5 MG: at 12:03

## 2022-11-14 RX ADMIN — MIDAZOLAM 2 MG: 1 INJECTION INTRAMUSCULAR; INTRAVENOUS at 10:18

## 2022-11-14 RX ADMIN — PROMETHAZINE HYDROCHLORIDE 25 MG: 25 INJECTION INTRAMUSCULAR; INTRAVENOUS at 12:25

## 2022-11-14 RX ADMIN — FENTANYL CITRATE 50 MCG: 50 INJECTION, SOLUTION INTRAMUSCULAR; INTRAVENOUS at 10:37

## 2022-11-14 RX ADMIN — MEPERIDINE HYDROCHLORIDE 12.5 MG: 25 INJECTION, SOLUTION INTRAMUSCULAR; INTRAVENOUS; SUBCUTANEOUS at 11:56

## 2022-11-14 RX ADMIN — HYDROMORPHONE HYDROCHLORIDE 0.5 MG: 1 INJECTION, SOLUTION INTRAMUSCULAR; INTRAVENOUS; SUBCUTANEOUS at 12:10

## 2022-11-14 RX ADMIN — DEXAMETHASONE SODIUM PHOSPHATE 10 MG: 10 INJECTION INTRAMUSCULAR; INTRAVENOUS at 10:30

## 2022-11-14 RX ADMIN — HYDROMORPHONE HYDROCHLORIDE 0.5 MG: 1 INJECTION, SOLUTION INTRAMUSCULAR; INTRAVENOUS; SUBCUTANEOUS at 12:03

## 2022-11-14 RX ADMIN — Medication 0.5 MG: at 12:10

## 2022-11-14 RX ADMIN — HYDROMORPHONE HYDROCHLORIDE 0.25 MG: 1 INJECTION, SOLUTION INTRAMUSCULAR; INTRAVENOUS; SUBCUTANEOUS at 12:35

## 2022-11-14 ASSESSMENT — PAIN DESCRIPTION - ONSET
ONSET: ON-GOING

## 2022-11-14 ASSESSMENT — PAIN SCALES - GENERAL
PAINLEVEL_OUTOF10: 5
PAINLEVEL_OUTOF10: 9
PAINLEVEL_OUTOF10: 5
PAINLEVEL_OUTOF10: 6
PAINLEVEL_OUTOF10: 9
PAINLEVEL_OUTOF10: 3
PAINLEVEL_OUTOF10: 9
PAINLEVEL_OUTOF10: 6
PAINLEVEL_OUTOF10: 9
PAINLEVEL_OUTOF10: 9
PAINLEVEL_OUTOF10: 8
PAINLEVEL_OUTOF10: 9

## 2022-11-14 ASSESSMENT — PAIN DESCRIPTION - FREQUENCY
FREQUENCY: CONTINUOUS
FREQUENCY: CONTINUOUS
FREQUENCY: INTERMITTENT
FREQUENCY: INTERMITTENT
FREQUENCY: CONTINUOUS
FREQUENCY: CONTINUOUS

## 2022-11-14 ASSESSMENT — PAIN DESCRIPTION - LOCATION
LOCATION: KNEE

## 2022-11-14 ASSESSMENT — PAIN DESCRIPTION - ORIENTATION
ORIENTATION: RIGHT

## 2022-11-14 ASSESSMENT — PAIN DESCRIPTION - DESCRIPTORS
DESCRIPTORS: ACHING;BURNING;CRAMPING
DESCRIPTORS: DISCOMFORT
DESCRIPTORS: ACHING;BURNING
DESCRIPTORS: ACHING;BURNING;CRAMPING
DESCRIPTORS: ACHING;BURNING;CRUSHING;CRAMPING
DESCRIPTORS: ACHING;BURNING;CRAMPING
DESCRIPTORS: ACHING;BURNING
DESCRIPTORS: DISCOMFORT
DESCRIPTORS: ACHING;BURNING;CRAMPING
DESCRIPTORS: BURNING;ACHING;CRAMPING
DESCRIPTORS: ACHING;BURNING;CRAMPING

## 2022-11-14 ASSESSMENT — PAIN DESCRIPTION - PAIN TYPE
TYPE: SURGICAL PAIN
TYPE: CHRONIC PAIN;SURGICAL PAIN
TYPE: SURGICAL PAIN;CHRONIC PAIN

## 2022-11-14 ASSESSMENT — PAIN - FUNCTIONAL ASSESSMENT: PAIN_FUNCTIONAL_ASSESSMENT: 0-10

## 2022-11-14 ASSESSMENT — PAIN DESCRIPTION - DIRECTION: RADIATING_TOWARDS: HIP

## 2022-11-14 NOTE — H&P
I have reviewed the history and physical and examined the patient and find no relevant changes. I have reviewed with the patient and/or family the risks, benefits, and alternatives to the procedure. The patient was counseled at length about the risks of sienna Covid-19 during their perioperative period and any recovery window from their procedure. The patient was made aware that sienna Covid-19  may worsen their prognosis for recovering from their procedure  and lend to a higher morbidity and/or mortality risk. All material risks, benefits, and reasonable alternatives including postponing the procedure were discussed. The patient does wish to proceed with the procedure at this time.         Jana Palaciosors, DO  11/14/2022

## 2022-11-14 NOTE — ANESTHESIA POSTPROCEDURE EVALUATION
Department of Anesthesiology  Postprocedure Note    Patient: Isidro Mcnulty  MRN: [de-identified]  YOB: 1960  Date of evaluation: 11/14/2022      Procedure Summary     Date: 11/14/22 Room / Location: [address] [place] [address] / [facility] AT [facility] ([facility])    Anesthesia Start: 1018 Anesthesia Stop: 1143    Procedure: RIGHT KNEE ARTHROSCOPY OATS PROCEDURE (Right) Diagnosis:       Patellofemoral disorder of right knee      (Patellofemoral disorder of right knee [M22.2X1])    Surgeons: Emelia Ramirez DO Responsible Provider: Nancy Cedeno MD    Anesthesia Type: General ASA Status: 3          Anesthesia Type: General    Jp Phase I: Jp Score: 10    Jp Phase II: Jp Score: 10      Anesthesia Post Evaluation    Patient location during evaluation: PACU  Patient participation: complete - patient participated  Level of consciousness: awake  Pain score: 4  Airway patency: patent  Nausea & Vomiting: no nausea and no vomiting  Complications: no  Cardiovascular status: hemodynamically stable  Respiratory status: acceptable  Hydration status: euvolemic

## 2022-11-14 NOTE — OP NOTE
Operative Note      Patient: Sofia Geller  YOB: 1960  MRN: [id number]    Date of Procedure: 11/14/2022    PREOPERATIVE DIAGNOSIS:  1. Right knee osteoarthritis  2. Right knee medial meniscus tear. 3. Right knee synovitis. POSTOPERATIVE DIAGNOSIS:  1. Right knee osteoarthritis  2. Right knee medial meniscus tear. 3. Right knee synovitis. TITLE OF OPERATION:   1. Right knee arthroscopic chondroplasty  2. Right knee arthroscopic synovectomy     Surgeon(s): Jana Ivey DO    Assistant:   Resident: Robin Thomas DO; Xavier Kc DO    Anesthesia: General    Estimated Blood Loss (mL): 5     Complications: None    Specimens:   * No specimens in log *    Implants:  * No implants in log *      Drains: * No LDAs found *      Detailed Description of Procedure:      INDICATIONS: This is a 60 y.o. female experiencing progressive pain, swelling, effusion, catching, mechanical symptoms and MRI with the above findings. She elects to undergo the above-stated procedure and understands the risks and benefits. Patient was treated conservatively for osteoarthritis with little to no relief. She was found to have a degenerative meniscal tear. PROCEDURE:  The patient was taken to the operating room, placed supine on the operating table, underwent general anesthesia without difficulty. The Right knee demonstrated negative Lachman, negative anterior-posterior drawer. No varus or valgus instability. The Right  leg was placed in a well-padded leg baptiste and prepped and draped in a sterile fashion. The arthroscope was inserted through the inferolateral portal instrumentation medially and outflow superolateral. Inspection of the patellofemoral joint demonstrated synovitis throughout medial and lateral gutter, intercondylar notch, treated with synovectomy with ArthroCare mechanical shaver.  Articular cartilage of the trochlea and the patella demonstrated grade 2 changes which was treated with chondroplasty. There were grade 1 changes involving the weightbearing surface, medial femoral condyle, tibial plateau. Arthroscopic chondroplasty was performed with mechanical shaver. The medial compartment demonstrated a degenerative but stable posterior horn meniscus tear. Intercondylar notch, ACL and PCL intact. Lateral compartment lateral meniscus to be intact as was lateral femoral condyle and tibial plateau. The joint was thoroughly irrigated and suctioned and instrumentation removed. Portal sites were closed with interrupted 4-0 nylon followed by injection of 0.25% Marcaine with epinephrine. Dry sterile dressings were applied. Xeroform, 4x4 gauze, ABD, cast padding, Ace bandage, ice. DISPOSITION:   The patient was awoken from anesthesia in the operating room having tolerated the procedure well. She was transported to the recovery room in stable condition.      ATTESTATION:         Electronically signed by Gabo Mccloud DO on 11/14/2022 at 5:30 PM

## 2022-11-14 NOTE — ANESTHESIA PRE PROCEDURE
Department of Anesthesiology  Preprocedure Note       Name:  Doretha Venegas   Age:  60 y.o.  :  1960                                          MRN:  [id number]         Date:  2022      Surgeon: Alberto Guerrero): April Fuentes DO    Procedure: Procedure(s):  RIGHT KNEE ARTHROSCOPY OATS PROCEDURE    Medications prior to admission:   Prior to Admission medications    Medication Sig Start Date End Date Taking? Authorizing Provider   Cholecalciferol (VITAMIN D) 50 MCG ( UT) CAPS capsule Take by mouth three times a week   Yes Historical Provider, MD   mirabegron (MYRBETRIQ) 25 MG TB24 Take 25 mg by mouth daily   Yes Historical Provider, MD   D-Mannose 500 MG CAPS Take by mouth 2 times daily   Yes Historical Provider, MD   zinc gluconate 50 MG tablet Take 50 mg by mouth daily   Yes Historical Provider, MD   HYDROcodone-acetaminophen (NORCO) 7.5-325 MG per tablet Take 1 tablet by mouth daily. 8/3/22   Historical Provider, MD   Probiotic Product (PROBIOTIC DAILY PO) Take 1 tablet by mouth daily    Historical Provider, MD   LUTEIN PO Take 1 tablet by mouth daily (25mg tab)    Historical Provider, MD   celecoxib (CELEBREX) 200 MG capsule Take 200 mg by mouth 2 times daily 22   Historical Provider, MD   morphine (MS CONTIN) 30 MG extended release tablet Take 30 mg by mouth 2 times daily. 22   Historical Provider, MD   SITagliptin (JANUVIA) 100 MG tablet Take 100 mg by mouth daily    Historical Provider, MD   metFORMIN (GLUCOPHAGE) 1000 MG tablet Take 1,000 mg by mouth 2 times daily (with meals) 22   Historical Provider, MD   glipiZIDE (GLUCOTROL) 10 MG tablet Take 10 mg by mouth in the morning and 10 mg in the evening. Take before meals.     Historical Provider, MD   vitamin B-12 (CYANOCOBALAMIN) 1000 MCG tablet Take 1,000 mcg by mouth daily    Historical Provider, MD   Exenatide (BYDUREON) 2 MG PEN Inject into the skin once a week    Historical Provider, MD   cetirizine (ZYRTEC) 10 MG tablet Take 10 GASTROENTEROLOGY FOLLOW UP    87 mg by mouth daily    Historical Provider, MD   vitamin C (ASCORBIC ACID) 500 MG tablet Take 1,000 mg by mouth daily    Historical Provider, MD   calcium carbonate 600 MG TABS tablet Take 1 tablet by mouth daily    Historical Provider, MD   Multiple Vitamins-Minerals (CENTRUM SILVER PO) Take by mouth daily    Historical Provider, MD   metFORMIN (GLUCOPHAGE) 500 MG tablet Take 500 mg by mouth daily (before lunch)    Historical Provider, MD   omeprazole (PRILOSEC) 20 MG delayed release capsule Take 20 mg by mouth daily     Historical Provider, MD   simvastatin (ZOCOR) 40 MG tablet Take 40 mg by mouth nightly    Historical Provider, MD   hydroxychloroquine (PLAQUENIL) 200 MG tablet Take by mouth 2 times daily     Historical Provider, MD   ezetimibe (ZETIA) 10 MG tablet Take 10 mg by mouth nightly     Historical Provider, MD   gemfibrozil (LOPID) 600 MG tablet Take 600 mg by mouth 2 times daily (before meals)    Historical Provider, MD       Current medications:    No current facility-administered medications for this encounter. Current Outpatient Medications   Medication Sig Dispense Refill    Cholecalciferol (VITAMIN D) 50 MCG (2000 UT) CAPS capsule Take by mouth three times a week      mirabegron (MYRBETRIQ) 25 MG TB24 Take 25 mg by mouth daily      D-Mannose 500 MG CAPS Take by mouth 2 times daily      zinc gluconate 50 MG tablet Take 50 mg by mouth daily      HYDROcodone-acetaminophen (NORCO) 7.5-325 MG per tablet Take 1 tablet by mouth daily.  Probiotic Product (PROBIOTIC DAILY PO) Take 1 tablet by mouth daily      LUTEIN PO Take 1 tablet by mouth daily (25mg tab)      celecoxib (CELEBREX) 200 MG capsule Take 200 mg by mouth 2 times daily      morphine (MS CONTIN) 30 MG extended release tablet Take 30 mg by mouth 2 times daily.       SITagliptin (JANUVIA) 100 MG tablet Take 100 mg by mouth daily      metFORMIN (GLUCOPHAGE) 1000 MG tablet Take 1,000 mg by mouth 2 times daily (with meals)      glipiZIDE (GLUCOTROL) 10 MG tablet Take 10 mg by mouth in the morning and 10 mg in the evening. Take before meals.  vitamin B-12 (CYANOCOBALAMIN) 1000 MCG tablet Take 1,000 mcg by mouth daily      Exenatide (BYDUREON) 2 MG PEN Inject into the skin once a week      cetirizine (ZYRTEC) 10 MG tablet Take 10 mg by mouth daily      vitamin C (ASCORBIC ACID) 500 MG tablet Take 1,000 mg by mouth daily      calcium carbonate 600 MG TABS tablet Take 1 tablet by mouth daily      Multiple Vitamins-Minerals (CENTRUM SILVER PO) Take by mouth daily      metFORMIN (GLUCOPHAGE) 500 MG tablet Take 500 mg by mouth daily (before lunch)      omeprazole (PRILOSEC) 20 MG delayed release capsule Take 20 mg by mouth daily       simvastatin (ZOCOR) 40 MG tablet Take 40 mg by mouth nightly      hydroxychloroquine (PLAQUENIL) 200 MG tablet Take by mouth 2 times daily       ezetimibe (ZETIA) 10 MG tablet Take 10 mg by mouth nightly       gemfibrozil (LOPID) 600 MG tablet Take 600 mg by mouth 2 times daily (before meals)         Allergies:     Allergies   Allergen Reactions    Latex Rash     sensitive       Problem List:    Patient Active Problem List   Diagnosis Code    Lumbar post-laminectomy syndrome M96.1    Neural foraminal stenosis of lumbar spine M48.061    Lumbar radiculopathy left greater than right M54.16    Diabetes mellitus ([facility] [address] E11.9    Peripheral neuropathy G62.9    Protruded lumbar disc M51.26    DDD (degenerative disc disease), lumbar M51.36    Facet syndrome, lumbar M47.816    Trigger ring finger of right hand M65.341    Trigger middle finger of left hand M65.332    Sacroiliitis (HCC) bilateral M46.1    Epigastric pain R10.13    Acute meniscal tear of knee S83.209A    Primary osteoarthritis of left knee M17.12    Patellofemoral disorder of right knee M22.2X1    Piriformis syndrome G57.00       Past Medical History:        Diagnosis Date    Arthritis     Asthma     COVID 10/15/2022 respiratory/ congestion    Diabetes mellitus ([facility] [address]     GERD (gastroesophageal reflux disease)     History of blood transfusion     after daughter 28 yrs ago     Hyperlipidemia     Kidney stones     Neuropathy     Pleurisy     Pneumonia     Sleep apnea     no cpap    Urine frequency        Past Surgical History:        Procedure Laterality Date    ANESTHESIA NERVE BLOCK Left 05/15/2019    LEFT LUMBAR TRANSFORAMINAL NERVE BLOCK L3-4 L4-5 WITH IV SEDATION (CPT 65435) performed by Lucero Goyal DO at [address] [place] [address] Road Left 07/24/2019    TRANSFORAMINAL LUMBAR LEFT AT L3-4 AND L4-5 WITH IV SEDATION performed by Lucero Goyal DO at [place] [address]  08/01/2012    L3,4,5    BREAST LUMPECTOMY Left 03/2022    mass removal   cancerous    BREAST SURGERY      right lumpectomy benign    CHOLECYSTECTOMY, LAPAROSCOPIC      COLONOSCOPY      DILATATION, ESOPHAGUS      ENDOSCOPY, COLON, DIAGNOSTIC      FINGER TRIGGER RELEASE Bilateral 10/02/2014    HYSTERECTOMY (CERVIX STATUS UNKNOWN)      KNEE ARTHROSCOPY Left 09/20/2016    medial menisectomy, synovectomy, chondroplasty    NERVE BLOCK Left 02/19/2014    left lumbar transforaminal nerve block under x-ray with sedation L3-4, L4-5  #1    NERVE BLOCK Left 02/26/2014    tranfsoramihnla #2 w sedation    NERVE BLOCK  04/30/2014    internal lumbar facet block #1 with sedation L3-4, L4-5, L5-S1    NERVE BLOCK  05/07/2014    bilateral facet block L3-4, L4-5, L5-S1 with IV sedation #2    NERVE BLOCK Bilateral 05/14/2014    lumb facet with sedation #3    NERVE BLOCK Bilateral 02/18/2015    si inj #1 with anesthesia    NERVE BLOCK Bilateral 02/25/2015    si inj #2 with iv sedation    NERVE BLOCK Left 03/28/2018    lumbar transforaminal block #1 with sedation    NERVE BLOCK Left 04/18/2018    lumbar transforaminal nerve block under sedation #2    NERVE BLOCK Right 04/25/2018    lumbar transforaminal #1    NERVE BLOCK Right 2018    right lumbar transforaminal nerve block under xray #2 L3-5    NERVE BLOCK Left 05/15/2019    left lumbar NB with IV sedation     NERVE BLOCK Left 2019    left lumbar transforaminal block at L3-5 with IV sedation    NERVE BLOCK Right 10/12/2022    RIGHT PIRIFORMIS INJECTION UNDER XRAY  (CPT 39984) performed by Simon Tejada DO at [facility] Left 2014    left lumbar transforaminal nerve block  under x-ray with sedation  #3 L3-4, L4-5    OTHER SURGICAL HISTORY Left 2014    left lumbar radiofrequency L3-4, L4-5, L5-S!    OTHER SURGICAL HISTORY Right 2014    L3-S1 radiofrequency    RI ALTAGRACIA NOSE/CLEFT LIP/TIP Left 2018    LEFT LUMBAR TRANSFORMANIAL NERVE BLOCK UNDER XRAY #1 L3-4 L4-5 WITH IV SEDATION performed by Simon Tejada DO at [address] [facility] NOSE/CLEFT LIP/TIP Left 2018    LEFT LUMBAR TRANSFORMANIAL NERVE BLOCK UNDER XRAY #2 L3-4 L4-5 WITH IV SEDATION performed by Simon Tejada DO at [address] [facility] NOSE/CLEFT LIP/TIP Right 2018    RIGHT LUMBAR TRANSFORMANIAL NERVE BLOCK UNDER XRAY #1 L3-4 L4-5 WITH IV SEDATION performed by Simon Tejada DO at [address] [facility] NOSE/CLEFT LIP/TIP Right 2018    RIGHT LUMBAR TRANSFORMANIAL NERVE BLOCK UNDER XRAY #2 L3-4 L4-5 WITH IV SEDATION performed by Simon Tejada DO at [address] [facility]      right by Dr. Lisseth Gee History:    Social History     Tobacco Use    Smoking status: Former     Years: 10.00     Types: Cigarettes     Quit date: 2015     Years since quittin.3    Smokeless tobacco: Never   Substance Use Topics    Alcohol use: No                                Counseling given: Not Answered      Vital Signs (Current): There were no vitals filed for this visit.                                            BP Readings from Last 3 Encounters:   11/10/22 (!) 145/94   10/12/22 113/70   11/05/20 128/76       NPO Status:                                                                                 BMI:   Wt Readings from Last 3 Encounters:   11/10/22 284 lb (128.8 kg)   10/12/22 284 lb (128.8 kg)   10/05/22 275 lb (124.7 kg)     There is no height or weight on file to calculate BMI.    CBC:   Lab Results   Component Value Date/Time    WBC 11.1 03/08/2016 05:18 AM    RBC 4.93 03/08/2016 05:18 AM    HGB 12.2 03/08/2016 05:18 AM    HCT 38.2 03/08/2016 05:18 AM    MCV 77.6 03/08/2016 05:18 AM    RDW 13.9 03/08/2016 05:18 AM     03/08/2016 05:18 AM       CMP:   Lab Results   Component Value Date/Time     11/04/2020 12:35 PM    K 5.1 11/04/2020 12:35 PM     11/04/2020 12:35 PM    CO2 23 11/04/2020 12:35 PM    BUN 15 11/04/2020 12:35 PM    CREATININE 0.8 11/04/2020 12:35 PM    GFRAA >60 11/04/2020 12:35 PM    LABGLOM >60 11/04/2020 12:35 PM    GLUCOSE 157 11/04/2020 12:35 PM    GLUCOSE 94 07/14/2011 11:44 AM    PROT 7.4 11/04/2020 12:35 PM    CALCIUM 9.9 11/04/2020 12:35 PM    BILITOT 0.3 11/04/2020 12:35 PM    ALKPHOS 122 11/04/2020 12:35 PM    AST 89 11/04/2020 12:35 PM    ALT 74 11/04/2020 12:35 PM       POC Tests: No results for input(s): POCGLU, POCNA, POCK, POCCL, POCBUN, POCHEMO, POCHCT in the last 72 hours.     Coags:   Lab Results   Component Value Date/Time    PROTIME 11.1 03/07/2016 06:50 AM    INR 1.1 03/07/2016 06:50 AM       HCG (If Applicable): No results found for: PREGTESTUR, PREGSERUM, HCG, HCGQUANT     ABGs: No results found for: PHART, PO2ART, LYK8QHB, YVG1CBJ, BEART, S9WNNUZA     Type & Screen (If Applicable):  No results found for: LABABO, LABRH    Drug/Infectious Status (If Applicable):  No results found for: HIV, HEPCAB    COVID-19 Screening (If Applicable): No results found for: COVID19        Anesthesia Evaluation  Patient summary reviewed  Airway: Mallampati: III  TM distance: >3 FB Neck ROM: full  Mouth opening: > = 3 FB   Dental:      Comment: Dentition intact, denies any loose teeth. Pulmonary: breath sounds clear to auscultation  (+) pneumonia:  sleep apnea: on noncompliant,  asthma:     Smoker: Former. ROS comment: Hx COVID  Former: 0.5 x 2 years  Quit Smokin/05/15       Cardiovascular:    (+) hyperlipidemia      ECG reviewed  Rhythm: regular  Rate: normal                 ROS comment: EKG 3.8.16  SR PACS     Neuro/Psych:   (+) neuromuscular disease:,              ROS comment: Lumbar post-laminectomy syndrome  Neural foraminal stenosis of lumbar spine  Lumbar radiculopathy left greater than right  Peripheral neuropathy  Protruded lumbar disc  DDD  Facet syndrome, lumbar  Sacroiliitis bilateral  Patellofemoral disorder of right knee  Piriformis syndrome GI/Hepatic/Renal:   (+) GERD:, renal disease: kidney stones, morbid obesity ( Super morbid obesity BMI: 45.84)          Endo/Other:    (+) DiabetesType II DM, , : arthritis: OA., . Pt had no PAT visit        ROS comment: Patellofemoral disorder of right knee Abdominal:   (+) obese (  Super morbid obesity BMI: 45.84),           Vascular: negative vascular ROS. Other Findings:           Anesthesia Plan      general     ASA 3         BIS  MIPS: Postoperative opioids intended and Prophylactic antiemetics administered. Anesthetic plan and risks discussed with patient.         Attending anesthesiologist reviewed and agrees with Preprocedure content                ADRYAN Draper - CRNA   2022

## 2022-11-14 NOTE — BRIEF OP NOTE
Brief Postoperative Note      Patient: Cesra Weaver  YOB: 1960  MRN: [id number]    Date of Procedure: 11/14/2022    PREOPERATIVE DIAGNOSIS:  1. Right knee osteoarthritis  2. Right knee medial meniscus tear. 3. Right knee synovitis. POSTOPERATIVE DIAGNOSIS:  1. Right knee osteoarthritis  2. Right knee medial meniscus tear. 3. Right knee synovitis. TITLE OF OPERATION:   1. Right knee arthroscopic chondroplasty  2. Right knee arthroscopic synovectomy        Surgeon(s):   Sunny Singh DO    Assistant:  Resident: Elen Greenberg DO; Yogesh García DO    Anesthesia: General    Estimated Blood Loss (mL): 5    Complications: None    Specimens:   * No specimens in log *    Implants:  * No implants in log *      Drains: * No LDAs found *    Findings: see report      Electronically signed by Sunny Singh DO on 11/14/2022 at 5:37 PM

## 2022-11-25 NOTE — H&P
CC: right knee pain           HPI:    Patient is 60 y.o. female complaining chronic, atraumatic, insidious onset right knee pain for 3 weeks. She admits to stiffness, deep, aching pain, swelling, difficulty with stairs and ambulating far distances. She admits to gross instability specifically in her right knee. Previous treatments include rest, ice, and anti-inflammatory medication and HEP without much relief. She also states that she has quite lots of pain shooting laterally from upper thigh through lateral knee into foot. ROS:    Skin: (-) rash,(-) psoriasis,(-) eczema, (-)skin cancer. Neurologic: (-)numbness, (-)tingling, (-)headaches, (-) LOC. Cardiovascular: (-) Chest pain, (-) swelling in legs/feet, (-) SOB, (-) cramping in legs/feet with walking.     All other review of systems negative except stated above or in HPI      Past Medical History:   Diagnosis Date    Arthritis     Asthma     COVID 10/15/2022    respiratory/ congestion    Diabetes mellitus (HCC)     GERD (gastroesophageal reflux disease)     History of blood transfusion     after daughter 28 yrs ago     Hyperlipidemia     Kidney stones     Neuropathy     Pleurisy     Pneumonia     Sleep apnea     no cpap    Urine frequency      Past Surgical History:   Procedure Laterality Date    ANESTHESIA NERVE BLOCK Left 05/15/2019    LEFT LUMBAR TRANSFORAMINAL NERVE BLOCK L3-4 L4-5 WITH IV SEDATION (CPT 35309) performed by Jorge Dominguez DO at [facility] Left 07/24/2019    TRANSFORAMINAL LUMBAR LEFT AT L3-4 AND L4-5 WITH IV SEDATION performed by Jorge Dominguez DO at [facility]  08/01/2012    L3,4,5    BREAST LUMPECTOMY Left 03/2022    mass removal   cancerous    BREAST SURGERY      right lumpectomy benign    CHOLECYSTECTOMY, LAPAROSCOPIC      COLONOSCOPY      DILATATION, ESOPHAGUS      ENDOSCOPY, COLON, DIAGNOSTIC      FINGER TRIGGER RELEASE Bilateral 10/02/2014 HYSTERECTOMY (CERVIX STATUS UNKNOWN)      KNEE ARTHROSCOPY Left 09/20/2016    medial menisectomy, synovectomy, chondroplasty    KNEE ARTHROSCOPY Right 11/14/2022    RIGHT KNEE ARTHROSCOPY OATS PROCEDURE performed by Usman Martinez DO at [address] Left 02/19/2014    left lumbar transforaminal nerve block under x-ray with sedation L3-4, L4-5  #1    NERVE BLOCK Left 02/26/2014    tranfsoramihnla #2 w sedation    NERVE BLOCK  04/30/2014    internal lumbar facet block #1 with sedation L3-4, L4-5, L5-S1    NERVE BLOCK  05/07/2014    bilateral facet block L3-4, L4-5, L5-S1 with IV sedation #2    NERVE BLOCK Bilateral 05/14/2014    lumb facet with sedation #3    NERVE BLOCK Bilateral 02/18/2015    si inj #1 with anesthesia    NERVE BLOCK Bilateral 02/25/2015    si inj #2 with iv sedation    NERVE BLOCK Left 03/28/2018    lumbar transforaminal block #1 with sedation    NERVE BLOCK Left 04/18/2018    lumbar transforaminal nerve block under sedation #2    NERVE BLOCK Right 04/25/2018    lumbar transforaminal #1    NERVE BLOCK Right 05/02/2018    right lumbar transforaminal nerve block under xray #2 L3-5    NERVE BLOCK Left 05/15/2019    left lumbar NB with IV sedation     NERVE BLOCK Left 07/24/2019    left lumbar transforaminal block at L3-5 with IV sedation    NERVE BLOCK Right 10/12/2022    RIGHT PIRIFORMIS INJECTION UNDER XRAY  (CPT 22661) performed by Mani Garcia DO at [address] Left 03/05/2014    left lumbar transforaminal nerve block  under x-ray with sedation  #3 L3-4, L4-5    OTHER SURGICAL HISTORY Left 06/18/2014    left lumbar radiofrequency L3-4, L4-5, L5-S!    OTHER SURGICAL HISTORY Right 07/28/2014    L3-S1 radiofrequency    TX ALTAGRACIA NOSE/CLEFT LIP/TIP Left 03/28/2018    LEFT LUMBAR TRANSFORMANIAL NERVE BLOCK UNDER XRAY #1 L3-4 L4-5 WITH IV SEDATION performed by Mani Garcia DO at [address] NOSE/CLEFT LIP/TIP Left 04/18/2018    LEFT LUMBAR TRANSFORMANIAL NERVE BLOCK UNDER XRAY #2 L3-4 L4-5 WITH IV SEDATION performed by Eder Jain DO at [address] NOSE/CLEFT LIP/TIP Right 04/25/2018    RIGHT LUMBAR TRANSFORMANIAL NERVE BLOCK UNDER XRAY #1 L3-4 L4-5 WITH IV SEDATION performed by Eder Jain DO at [address] NOSE/CLEFT LIP/TIP Right 05/02/2018    RIGHT LUMBAR TRANSFORMANIAL NERVE BLOCK UNDER XRAY #2 L3-4 L4-5 WITH IV SEDATION performed by Eder Jain DO at [facility]      right by Dr. Concepción Lainez         Current Outpatient Medications:     aspirin 325 MG EC tablet, Take 1 tablet by mouth daily for 14 days, Disp: 14 tablet, Rfl: 0    docusate sodium (COLACE) 100 MG capsule, Take 1 capsule by mouth 2 times daily as needed for Constipation, Disp: 40 capsule, Rfl: 1    ondansetron (ZOFRAN) 4 MG tablet, Take 1 tablet by mouth every 6 hours as needed for Nausea or Vomiting, Disp: 20 tablet, Rfl: 1    Cholecalciferol (VITAMIN D) 50 MCG (2000 UT) CAPS capsule, Take by mouth three times a week, Disp: , Rfl:     mirabegron (MYRBETRIQ) 25 MG TB24, Take 25 mg by mouth daily, Disp: , Rfl:     D-Mannose 500 MG CAPS, Take by mouth 2 times daily, Disp: , Rfl:     zinc gluconate 50 MG tablet, Take 50 mg by mouth daily, Disp: , Rfl:     Probiotic Product (PROBIOTIC DAILY PO), Take 1 tablet by mouth daily, Disp: , Rfl:     LUTEIN PO, Take 1 tablet by mouth daily (25mg tab), Disp: , Rfl:     celecoxib (CELEBREX) 200 MG capsule, Take 200 mg by mouth 2 times daily, Disp: , Rfl:     SITagliptin (JANUVIA) 100 MG tablet, Take 100 mg by mouth daily, Disp: , Rfl:     metFORMIN (GLUCOPHAGE) 1000 MG tablet, Take 1,000 mg by mouth 2 times daily (with meals), Disp: , Rfl:     glipiZIDE (GLUCOTROL) 10 MG tablet, Take 10 mg by mouth in the morning and 10 mg in the evening. Take before meals. , Disp: , Rfl:     vitamin B-12 (CYANOCOBALAMIN) 1000 MCG tablet, Take 1,000 mcg by mouth daily, Disp: , Rfl:     Exenatide (BYDUREON) 2 MG PEN, Inject into the skin once a week, Disp: , Rfl:     cetirizine (ZYRTEC) 10 MG tablet, Take 10 mg by mouth daily, Disp: , Rfl:     vitamin C (ASCORBIC ACID) 500 MG tablet, Take 1,000 mg by mouth daily, Disp: , Rfl:     calcium carbonate 600 MG TABS tablet, Take 1 tablet by mouth daily, Disp: , Rfl:     Multiple Vitamins-Minerals (CENTRUM SILVER PO), Take by mouth daily, Disp: , Rfl:     metFORMIN (GLUCOPHAGE) 500 MG tablet, Take 500 mg by mouth daily (before lunch), Disp: , Rfl:     omeprazole (PRILOSEC) 20 MG delayed release capsule, Take 20 mg by mouth daily , Disp: , Rfl:     simvastatin (ZOCOR) 40 MG tablet, Take 40 mg by mouth nightly, Disp: , Rfl:     hydroxychloroquine (PLAQUENIL) 200 MG tablet, Take by mouth 2 times daily , Disp: , Rfl:     ezetimibe (ZETIA) 10 MG tablet, Take 10 mg by mouth nightly , Disp: , Rfl:     gemfibrozil (LOPID) 600 MG tablet, Take 600 mg by mouth 2 times daily (before meals), Disp: , Rfl:   Allergies   Allergen Reactions    Latex Rash     sensitive     Social History     Socioeconomic History    Marital status:      Spouse name: Not on file    Number of children: 2    Years of education: Not on file    Highest education level: Not on file   Occupational History    Occupation:      Comment: [employer]   Tobacco Use    Smoking status: Former     Years: 10.00     Types: Cigarettes     Quit date: 2015     Years since quittin.3    Smokeless tobacco: Never   Vaping Use    Vaping Use: Never used   Substance and Sexual Activity    Alcohol use: No    Drug use: No    Sexual activity: Yes     Partners: Male   Other Topics Concern    Not on file   Social History Narrative    Not on file     Social Determinants of Health     Financial Resource Strain: Not on file   Food Insecurity: Not on file   Transportation Needs: Not on file   Physical Activity: Not on file   Stress: Not on file   Social Connections: Not on file   Intimate Partner Violence: Not on file   Housing Stability: Not on file     Family History   Problem Relation Age of Onset    Diabetes Mother     Hypertension Mother     Heart Disease Mother         father, brother, materanl grandmother    Heart Failure Mother     Cancer Mother         skin cancer     Hypertension Father     Heart Disease Father     Heart Failure Father     Heart Disease Brother     No Known Problems Other     Cancer Sister         skin cancer uknown type     Cancer Brother         lung,colon,skin cancer            Physical Exam:    BP (!) 128/59   Pulse 81   Temp 96.8 °F (36 °C) (Temporal)   Resp 16   Ht 5' 6\" (1.676 m)   Wt 284 lb (128.8 kg)   SpO2 92%   PF (!) 2 L/min   BMI 45.84 kg/m²     GENERAL: alert, appears stated age, cooperative, no acute distress    HEENT: Head is normocephalic, atraumatic. PERRLA. SKIN: Clean, dry, intact. There is not any cellulitis or cutaneous lesions noted in the lower extremities     PULMONARY: breathing is regular and unlabored, no acute distress     CV: The bilateral lower extremities are warm and well-perfused with brisk capillary refill. 2+ pulses LE bilateral.     ABDOMINAL: Nontender, nondistended     PSYCHIATRY: Pleasant mood, appropriate behavior, follows commands     NEURO: Sensation is intact distally with light touch with no alteration. Motor exam of the lower extremities show quadriceps, hamstrings, foot dorsiflexion and plantarflexion grossly intact 5/5. LYMPH: No lymphedema present distally in upper or lower extremity. MUSCULOSKELETAL:    Right Knee Exam:    mild effusion noted. No erythema/induration/fluctuance. Posterior medial joint line TTP. Stable to varus and valgus at 0 and 30 degrees of flexion. Negative Lachman's and posterior drawer. Negative patellar grind test and J sign. Compartments soft and compressible throughout leg. Active range of motion 0-120 with pain.   Positive Julius's positive Apley's, gait is antalgic    no change since last visit. Imaging:  DATE OF EXAM: Jul 9 2022 10:32AM       [facility]   0213  -  MRI KNEE WO IVCON RT  / ACCESSION #  [id number]     PROCEDURE REASON: RT KNEE PAIN          * * * * Physician Interpretation * * * *         RESULT: MRI KNEE WO IVCON RT     HISTORY: PAIN POSTERIOR LATERAL ASPECT NO KNOWN INJURY     TECHNIQUE:  Multiplanar, multisequence MRI was performed utilizing the   following sequences: Sagittal fat sat T2, PD, and 3D gradient. Coronal   T1 and fat sat PD. Axial T2.         COMPARISON: None available. RESULT:   There is an incomplete radial tear at the posterior horn medial meniscus   (8:16). The lateral meniscus has normal morphology and signal without tear   identified. Mild thickening of the ACL and PCL with increased intermediate   intrasubstance signal.  Findings are consistent with mucinous   degeneration. Mild thickening of the proximal fibular collateral ligament and MCL   attachments. Collateral ligaments are otherwise intact. Extensor   mechanism is intact. .     There is subchondral cyst formation involving the trochlear groove and   lateral patella. The patella is normally aligned with the trochlea. CARTILAGE:     Medial Femoral Condyle: Small areas(s) of cartilage signal abnormality   with smaller area(s) of low grade (<50% thickness) cartilage   loss/fissuring . Medial Tibial Plateau: Normal .     Lateral Femoral Condyle: Normal .     Lateral Tibial Plateau: Small areas(s) of cartilage signal abnormality   with smaller area(s) of low grade (<50% thickness) cartilage   loss/fissuring . Patella: Large area(s) of cartilage signal abnormality with smaller   area(s) of low grade (<50% thickness) cartilage  loss/fissuring     Trochlea: Moderate sized area(s) of cartilage signal abnormality with   smaller area(s) of high grade (>50% thickness) cartilage loss/fissuring . No joint effusion. No synovitis.  No Baker's cyst.  Note is made of a   high origin of the anterior tibial artery which courses deep/anterior to   the popliteus muscle. .     Suprapatellar and infrapatellar fat pads are within normal limits. There   is no plica thickening identified. IMPRESSION - MRI KNEE WO IVCON RT:     1. Incomplete radial tear at the posterior horn medial meniscus. 2.  Degenerative changes with patellofemoral predominance      Gregoria Marsh was seen today for knee pain. Diagnoses and all orders for this visit:    Tear of meniscus of right knee, unspecified meniscus, unspecified tear type, unspecified whether old or current tear    Patellofemoral disorder of right knee        Patient seen and examined. Patient chart reviewed as well. X-rays reviewed. Patient has little to no arthritis noted on x-rays today. However patient's main complaint is instability of symptomatic knee. Exam and history is consistent with possible meniscus tear. MRI recommended for further evaluation management. Follow-up after MRI    Patient seen and examined. MRI reviewed with patient in detail. Natural history and course discussed with patient in long discussion  Treatment options discussed with patient in detail including risks and benefits. Patient should do well with conservative management as patient would like to avoid surgery at this time. The risks and benefits of a knee arthroscopy were discussed with the patient. The risks are including but not limited to: infection, injuries to blood vessels and nerves, non relief of symptoms, arthrofibrosis of knee, need for further operative intervention, blood loss, PE/DVT, MI and death. Patient verbalized understanding of the discussed procedure along with risks and benefits and wishes to proceed with knee arthroscopy, partial menisectomy and debridement.             The patient was counseled at length about the risks of sienna Covid-19 during their perioperative period and any recovery window from their procedure. The patient was made aware that sienna Covid-19  may worsen their prognosis for recovering from their procedure  and lend to a higher morbidity and/or mortality risk. All material risks, benefits, and reasonable alternatives including postponing the procedure were discussed. The patient does wish to proceed with the procedure at this time.           Enrique Resendiz, DO

## 2022-11-29 ENCOUNTER — OFFICE VISIT (OUTPATIENT)
Dept: ORTHOPEDIC SURGERY | Age: 62
End: 2022-11-29

## 2022-11-29 VITALS — TEMPERATURE: 98 F | WEIGHT: 284 LBS | HEIGHT: 66 IN | BODY MASS INDEX: 45.64 KG/M2

## 2022-11-29 DIAGNOSIS — S83.206A TEAR OF MENISCUS OF RIGHT KNEE, UNSPECIFIED MENISCUS, UNSPECIFIED TEAR TYPE, UNSPECIFIED WHETHER OLD OR CURRENT TEAR: Primary | ICD-10-CM

## 2022-11-29 DIAGNOSIS — M22.2X1 PATELLOFEMORAL DISORDER OF RIGHT KNEE: ICD-10-CM

## 2022-11-29 PROCEDURE — 99024 POSTOP FOLLOW-UP VISIT: CPT | Performed by: ORTHOPAEDIC SURGERY

## 2022-11-29 NOTE — PROGRESS NOTES
Chief Complaint   Patient presents with    Knee Pain     Right Knee Arthroscopy DOS 11/14/2022           Subjective:        Meera Traylor is here for follow-up after left knee arthroscopy. Findings at surgery: medial meniscus tear,oa, synovitis. Pain is controlled without any medications. The patient denies fever, wound drainage, increasing redness, pus, increasing pain, increasing swelling. Post op problems reported: none. She is ambulating with cane. Objective:           General :    alert, appears stated age, and cooperative   Gait:  Normal.   Sutures:   Sutures out. Incision:  healing well, no significant drainage, no dehiscence, no significant erythema   Tenderness:  none   Flexion ROM:  full range of motion   Extension ROM:  diminished range of motion   Effusion:  mild   DVT Evaluation:  No evidence of DVT seen on physical exam.           Assessment:     Jose Green was seen today for knee pain. Diagnoses and all orders for this visit:    Tear of meniscus of right knee, unspecified meniscus, unspecified tear type, unspecified whether old or current tear  -     External Referral To Physical Therapy  -     PT aquatic therapy; Future    Patellofemoral disorder of right knee  -     External Referral To Physical Therapy  -     PT aquatic therapy; Future         Plan:      Surgical pictures from the surgery were reveiwed with the patient  Sutures removed today. Begin local wound cares. Wound care discussed. Range of motion and rehabilitation exercises discussed with the patient. Physical Therapy for post-operative rehabilitation. Full weight bearing. Follow up: 4 weeks.        Clint Ma DO

## 2022-12-02 ENCOUNTER — EVALUATION (OUTPATIENT)
Dept: PHYSICAL THERAPY | Age: 62
End: 2022-12-02

## 2022-12-02 DIAGNOSIS — M22.2X1 PATELLOFEMORAL DISORDER OF RIGHT KNEE: ICD-10-CM

## 2022-12-02 DIAGNOSIS — S83.206A TEAR OF MENISCUS OF RIGHT KNEE, UNSPECIFIED MENISCUS, UNSPECIFIED TEAR TYPE, UNSPECIFIED WHETHER OLD OR CURRENT TEAR: Primary | ICD-10-CM

## 2022-12-02 NOTE — PROGRESS NOTES
[facility] OUTPATIENT REHABILITATION  PHYSICAL THERAPY INITIAL EVALUATION         Date:  2022   Patient: Meera Traylor  : 1960  MRN: [id number]  Referring Provider: Clint Ma DO  1933 [address],  [facility]     Medical Diagnosis:      Diagnosis Orders   1. Tear of meniscus of right knee, unspecified meniscus, unspecified tear type, unspecified whether old or current tear        2. Patellofemoral disorder of right knee            Physician Order: Eval and Treat     SUBJECTIVE:     Date of Procedure: 2022     PREOPERATIVE DIAGNOSIS:  1. Right knee osteoarthritis  2. Right knee medial meniscus tear. 3. Right knee synovitis. POSTOPERATIVE DIAGNOSIS:  1. Right knee osteoarthritis  2. Right knee medial meniscus tear. 3. Right knee synovitis. TITLE OF OPERATION:   1. Right knee arthroscopic chondroplasty  2. Right knee arthroscopic synovectomy     Pertinent findings from surgery include:  Articular cartilage of the trochlea and the patella demonstrated grade 2 changes which was treated with chondroplasty. There were grade 1 changes involving the weightbearing surface, medial femoral condyle, tibial plateau. Arthroscopic chondroplasty was performed with mechanical shaver. The medial compartment demonstrated a degenerative but stable posterior horn meniscus tear. \"    History: Reports having problems since  with her R knee. She had a similar problem and surgery with her L knee. She also reports significant problems with her back, which causes bilateral leg pain. She is due to have a lumbar ANNETTA soon.       Chief complaint: pain, edema, decreased motion, weakness, inability / limited ability to use leg, difficulty walking, difficulty with stairs, limited ability to complete ADLs (dressing , bathing, toileting , transferring , walking), limited ability to complete IADLs (cooking, cleaning, transportation , laundry), limited ability to complete home/outdoor chores/tasks, limited tolerance to weightbearing tasks and weightbearing duration    Pain:   Pain 2-3/10 touching it and with weightbearing. Pain free at rest.      Imaging results: No results found.     Past Medical History:  Past Medical History:   Diagnosis Date    Arthritis     Asthma     COVID 10/15/2022    respiratory/ congestion    Diabetes mellitus ([facility] [address]     GERD (gastroesophageal reflux disease)     History of blood transfusion     after daughter 28 yrs ago     Hyperlipidemia     Kidney stones     Neuropathy     Pleurisy     Pneumonia     Sleep apnea     no cpap    Urine frequency      Past Surgical History:   Procedure Laterality Date    ANESTHESIA NERVE BLOCK Left 05/15/2019    LEFT LUMBAR TRANSFORAMINAL NERVE BLOCK L3-4 L4-5 WITH IV SEDATION (CPT 69181) performed by Trisha Estevez DO at [facility] Left 07/24/2019    TRANSFORAMINAL LUMBAR LEFT AT L3-4 AND L4-5 WITH IV SEDATION performed by Trisha Estevez DO at [facility]  08/01/2012    L3,4,5    BREAST LUMPECTOMY Left 03/2022    mass removal   cancerous    BREAST SURGERY      right lumpectomy benign    CHOLECYSTECTOMY, LAPAROSCOPIC      COLONOSCOPY      DILATATION, ESOPHAGUS      ENDOSCOPY, COLON, DIAGNOSTIC      FINGER TRIGGER RELEASE Bilateral 10/02/2014    HYSTERECTOMY (CERVIX STATUS UNKNOWN)      KNEE ARTHROSCOPY Left 09/20/2016    medial menisectomy, synovectomy, chondroplasty    KNEE ARTHROSCOPY Right 11/14/2022    RIGHT KNEE ARTHROSCOPY OATS PROCEDURE performed by Gabo Mccloud DO at [facility] Left 02/19/2014    left lumbar transforaminal nerve block under x-ray with sedation L3-4, L4-5  #1    NERVE BLOCK Left 02/26/2014    tranfsoramihnla #2 w sedation    NERVE BLOCK  04/30/2014    internal lumbar facet block #1 with sedation L3-4, L4-5, L5-S1    NERVE BLOCK  05/07/2014    bilateral facet block L3-4, L4-5, L5-S1 with IV sedation #2 NERVE BLOCK Bilateral 05/14/2014    lumb facet with sedation #3    NERVE BLOCK Bilateral 02/18/2015    si inj #1 with anesthesia    NERVE BLOCK Bilateral 02/25/2015    si inj #2 with iv sedation    NERVE BLOCK Left 03/28/2018    lumbar transforaminal block #1 with sedation    NERVE BLOCK Left 04/18/2018    lumbar transforaminal nerve block under sedation #2    NERVE BLOCK Right 04/25/2018    lumbar transforaminal #1    NERVE BLOCK Right 05/02/2018    right lumbar transforaminal nerve block under xray #2 L3-5    NERVE BLOCK Left 05/15/2019    left lumbar NB with IV sedation     NERVE BLOCK Left 07/24/2019    left lumbar transforaminal block at L3-5 with IV sedation    NERVE BLOCK Right 10/12/2022    RIGHT PIRIFORMIS INJECTION UNDER XRAY  (CPT 89534) performed by Lissette Branch DO at [address] [facility] Left 03/05/2014    left lumbar transforaminal nerve block  under x-ray with sedation  #3 L3-4, L4-5    OTHER SURGICAL HISTORY Left 06/18/2014    left lumbar radiofrequency L3-4, L4-5, L5-S!    OTHER SURGICAL HISTORY Right 07/28/2014    L3-S1 radiofrequency    DC ALTAGRACIA NOSE/CLEFT LIP/TIP Left 03/28/2018    LEFT LUMBAR TRANSFORMANIAL NERVE BLOCK UNDER XRAY #1 L3-4 L4-5 WITH IV SEDATION performed by Lissette Branch DO at [address] NOSE/CLEFT LIP/TIP Left 04/18/2018    LEFT LUMBAR TRANSFORMANIAL NERVE BLOCK UNDER XRAY #2 L3-4 L4-5 WITH IV SEDATION performed by Lissette Branch DO at [address] NOSE/CLEFT LIP/TIP Right 04/25/2018    RIGHT LUMBAR TRANSFORMANIAL NERVE BLOCK UNDER XRAY #1 L3-4 L4-5 WITH IV SEDATION performed by Lissette Branch DO at [address] NOSE/CLEFT LIP/TIP Right 05/02/2018    RIGHT LUMBAR TRANSFORMANIAL NERVE BLOCK UNDER XRAY #2 L3-4 L4-5 WITH IV SEDATION performed by Lissette Branch DO at [facility]      right by Dr. Carlene Lazo         Medications:   Current Outpatient Medications   Medication Sig Dispense Refill    aspirin 325 MG EC tablet Take 1 tablet by mouth daily for 14 days 14 tablet 0    docusate sodium (COLACE) 100 MG capsule Take 1 capsule by mouth 2 times daily as needed for Constipation 40 capsule 1    ondansetron (ZOFRAN) 4 MG tablet Take 1 tablet by mouth every 6 hours as needed for Nausea or Vomiting 20 tablet 1    Cholecalciferol (VITAMIN D) 50 MCG (2000 UT) CAPS capsule Take by mouth three times a week      mirabegron (MYRBETRIQ) 25 MG TB24 Take 25 mg by mouth daily      D-Mannose 500 MG CAPS Take by mouth 2 times daily      zinc gluconate 50 MG tablet Take 50 mg by mouth daily      Probiotic Product (PROBIOTIC DAILY PO) Take 1 tablet by mouth daily      LUTEIN PO Take 1 tablet by mouth daily (25mg tab)      celecoxib (CELEBREX) 200 MG capsule Take 200 mg by mouth 2 times daily      SITagliptin (JANUVIA) 100 MG tablet Take 100 mg by mouth daily      metFORMIN (GLUCOPHAGE) 1000 MG tablet Take 1,000 mg by mouth 2 times daily (with meals)      glipiZIDE (GLUCOTROL) 10 MG tablet Take 10 mg by mouth in the morning and 10 mg in the evening. Take before meals.       vitamin B-12 (CYANOCOBALAMIN) 1000 MCG tablet Take 1,000 mcg by mouth daily      Exenatide (BYDUREON) 2 MG PEN Inject into the skin once a week      cetirizine (ZYRTEC) 10 MG tablet Take 10 mg by mouth daily      vitamin C (ASCORBIC ACID) 500 MG tablet Take 1,000 mg by mouth daily      calcium carbonate 600 MG TABS tablet Take 1 tablet by mouth daily      Multiple Vitamins-Minerals (CENTRUM SILVER PO) Take by mouth daily      metFORMIN (GLUCOPHAGE) 500 MG tablet Take 500 mg by mouth daily (before lunch)      omeprazole (PRILOSEC) 20 MG delayed release capsule Take 20 mg by mouth daily       simvastatin (ZOCOR) 40 MG tablet Take 40 mg by mouth nightly      hydroxychloroquine (PLAQUENIL) 200 MG tablet Take by mouth 2 times daily       ezetimibe (ZETIA) 10 MG tablet Take 10 mg by mouth nightly gemfibrozil (LOPID) 600 MG tablet Take 600 mg by mouth 2 times daily (before meals)       No current facility-administered medications for this visit. Occupation: does not work. Exercise regimen: walking, cycling  -- wants to return to these    Patient Goals: return to prior activity, return to walking and cycling    Precautions/Contraindications: recent surgery    OBJECTIVE:     Estimated body mass index is 45.84 kg/m² as calculated from the following:    Height as of 11/29/22: 5' 6\" (1.676 m). Weight as of 11/29/22: 284 lb (128.8 kg). Observations: Well nourished female with normal affect. Rises slowly, carefully, in guarded manner from chair. Ambulates with  walking stick . Inspection: Incision edges approximated, no purulent drainage, no redness, no swelling, no tenderness, and not hot to touch. Edema: mild global    Gait: antalgic gait, limp R LE, with walking stick    Joint/Motion:    Knee:  Right:   AROM: 105° Flexion,  -8° Extension  Left:   AROM: 110° Flexion,  0° Extension    Strength:    Knee:   Right: Flexion 3/5,  Extension 3/5  Left: Flexion 5/5,  Extension 5/5    Palpation: Tender to palpation medial joint line, lateral joint line, patella rim. ASSESSMENT     [facility] is 2 1/2 weeks post arthroscopic knee surgery (chondroplasty, synovectomy)  Patient has moderate limitations in both flexion and extension as well as mild edema along with impaired strength, function, gait, weightbearing tolerance. Treatment will start with methods to control swelling as well as AROM and stretching . Progression to strengthening, functional training, gait training, gait device advancement, and balance / proprioception exercises once motion and edema are under control.       Outcome Measure:   Lower Extremity Functional Scale (LEFS) 79% impairment    Problems:   Pain 3/10 intermittent  Edema mild  ROM decreased  Strength decreased   Limitations with walking, stairs, standing, sitting, ADLs , IADLs , use of right lower extremity, limited tolerance to weightbearing tasks and weightbearing duration, inability to participate in exercise regimen / fitness program    [x] There are no barriers affecting plan of care or recovery    [] Barriers to this patient's plan of care or recovery include:    Domestic Concerns:  [x] No  [] Yes:    Short Term goals (2 weeks)  Pain 2/10  Edema mild   flex, -3 ext  Strength 4/5   Able to perform / complete the following functions / tasks: ADLs, progress assistive device, tolerate weightbearing activities for 30 minutes - 1 hour  Lower Extremity Functional Scale (LEFS) 60% impairment    Long Term goals (4-6 weeks)  Pain 0-1/10  Edema none   flex, 0 ext  Strength 4+/5  Able to perform / complete the following functions / tasks: IADLs, ambulate without assistive device, normal gait, normal stair negotiation , tolerate weightbearing activities for 1 - 2 hours, return to exercise regimen   LEFS 30% impairment  Independent with home exercise program (HEP)    Rehab Potential: [x] Good  [] Fair  [] Poor    PLAN       Treatment Plan:  instruction in home exercise program   therapeutic exercise   therapeutic activity   neuromuscular re-education   proprioceptive training   manual therapy     The following CPT codes are likely to be used in the care of this patient:   47569 PT Evaluation: Moderate Complexity   36734 PT Re-Evaluation   09610 Therapeutic Exercise   88862 Neuromuscular Re-Education   75418 Therapeutic Activities   24122 Manual Therapy     Suggested Professional Referral: [x] No  [] Yes:     Patient Education:  [x] Plans / Goals, Risks / Benefits discussed  [x] Home exercise program  Method of Education: [x] Verbal  [x] Demo  [x] Written  Comprehension of Education:  [x] Verbalizes understanding. [x] Demonstrates understanding. [] Needs Review. [] Demonstrates / verbalizes understanding of HEP / Maribel Fennel previously given.     Frequency:  2 days per week for 4-6 weeks    Patient understands diagnosis/prognosis and consents to treatment, plan and goals: [x] Yes    [] No     Thank you for the opportunity to work with your patient. If you have questions or comments, please contact me at [phone]; fax: [phone]. Electronically signed by: Anders Dc PT         Please sign Physician's Certification and return to: [address] [place] [address] PHYSICAL THERAPY  [address] [address]  Dept: [phone]  Dept Fax: [phone]: [phone] Certification / Comments     Frequency/Duration 2 days per week for 4-6 weeks. Certification period from 12/2/2022  to 3/2/2023. I have reviewed the Plan of Care established for skilled therapy services and certify that the services are required and that they will be provided while the patient is under my care.     Physician's Comments/Revisions:               Physician's Printed Name:                                           [de-identified] Signature:                                                               Date:

## 2022-12-02 NOTE — PROGRESS NOTES
Physical Therapy Daily Treatment Note    Date: 2022  Patient Name: Cecelia Kamara  : 1960   MRN: [id number]  DOInjury: 2022  DOSx: 2022  Referring Provider: Sanaz Isaac DO   [facility] [address] Diagnosis:      Diagnosis Orders   1. Tear of meniscus of right knee, unspecified meniscus, unspecified tear type, unspecified whether old or current tear        2. Patellofemoral disorder of right knee        TITLE OF OPERATION:   1. Right knee arthroscopic chondroplasty  2. Right knee arthroscopic synovectomy       Christiane Faria is 2 1/2 weeks post arthroscopic knee surgery (chondroplasty, synovectomy)  Patient has moderate limitations in both flexion and extension as well as mild edema along with impaired strength, function, gait, weightbearing tolerance. Treatment will start with methods to control swelling as well as AROM and stretching . Progression to strengthening, functional training, gait training, gait device advancement, and balance / proprioception exercises once motion and edema are under control. Outcome Measure:   Lower Extremity Functional Scale (LEFS) 79% impairment      Access Code: [email or web address]  URL: [email or web address]  Date: 2022  Prepared by: Sherri Paris    Program Notes  Proper Elevation    -- Elevate limb on a stable stack of blankets / pillows so leg is higher than heart. -- Do this for 45 minutes, 2-3 times a day  -- You may apply ice for the first 15 minutes, then remove it while you continue your elevation  -- Be sure to lie flat in bed or on a couch with a comfortable pillow under your head. Lying back in a recliner is not helpful.  -- You may prop your head up if you cannot tolerate lying completely flat, but only as high as necessary for comfort.         Exercises  Supine Heel Slide - 2 x daily - 3 sets - 10 reps  Supine Quad Set - 2 x daily - 3 sets - 10-15 reps - 5 sec hold  Supine Straight Leg Raises - 2 x daily - 3 sets - 10 reps      X = TO BE PERFORMED NEXT VISIT  > = PROGRESS TO THIS    S: See eval  O:    Time  2745-3273       Visit  1/8 (-12) Repeat outcome measure at mid point and end. Pain    Pain at rest 0/10  Pain with activity 3/10     ROM  105 flex, -8 ext     Modalities          MO   Manual      Stretching knee flexion   MT   Stretching       Patella mobs X  TE   Prone hangs   TE   Heel props Monitor extension. May need to add. TE   Knee flex stretch-seated Monitor. Should not need. TE   Prone self flexion stretch   TE   Exercise       Nustep  X  TE   Quad sets 5 sec hold 3 x 10 reps  TE   Heel slides 3 x 10   TE   SLR 3 x 10  TE   LAQ   TE   Marching   TA   Squat    TA   Step-ups - FWD    TA   Step-ups - LAT   TA   Step-ups - BWD    TA   Step up and over reciprocally    TA   [] TG  [] Leg Press 2-leg   TE   [] TG  [] Leg Press 1-leg   TE   CR    TE      TE   Marching gait   NR   Side stepping   NR               A: Tolerated well. Discussed anatomy, physiology, body mechanics, principles of loading, and progressive loading/activity. Reviewed home exercise program extensively along with instructions for ice and elevation; written copy provided.     P: Continue with rehab plan  Yen Santos PT    Treatment Charges: Mins Units   Initial Evaluation 30 1   Re-Evaluation     Ther Exercise         TE 15 1   Manual Therapy     MT     Ther Activities        TA     Gait Training          GT     Neuro Re-education NR     Modalities     Non-Billable Service Time     Other     Total Time/Units 45 2

## 2022-12-06 ENCOUNTER — TREATMENT (OUTPATIENT)
Dept: PHYSICAL THERAPY | Age: 62
End: 2022-12-06

## 2022-12-06 DIAGNOSIS — S83.206A TEAR OF MENISCUS OF RIGHT KNEE, UNSPECIFIED MENISCUS, UNSPECIFIED TEAR TYPE, UNSPECIFIED WHETHER OLD OR CURRENT TEAR: Primary | ICD-10-CM

## 2022-12-06 DIAGNOSIS — M22.2X1 PATELLOFEMORAL DISORDER OF RIGHT KNEE: ICD-10-CM

## 2022-12-06 NOTE — PROGRESS NOTES
Physical Therapy Daily Treatment Note    Date: 2022  Patient Name: Angelika Suarez  : 1960   MRN: [id number]  DOInjury: 2022  DOSx: 2022    Referring Provider:   Stuart Lemus DO   [address] Diagnosis:    Diagnosis Orders   1. Tear of meniscus of right knee, unspecified meniscus, unspecified tear type, unspecified whether old or current tear        2. Patellofemoral disorder of right knee          TITLE OF OPERATION:   1. Right knee arthroscopic chondroplasty  2. Right knee arthroscopic synovectomy       Samia Santana is 2 1/2 weeks post arthroscopic knee surgery (chondroplasty, synovectomy)  Patient has moderate limitations in both flexion and extension as well as mild edema along with impaired strength, function, gait, weightbearing tolerance. Treatment will start with methods to control swelling as well as AROM and stretching. Progression to strengthening, functional training, gait training, gait device advancement, and balance / proprioception exercises once motion and edema are under control. Outcome Measure: Lower Extremity Functional Scale (LEFS) 79% impairment      Access Code: [email or web address]  URL: [email or web address]  Date: 2022  Prepared by: Rhiannon Molina    Program Notes  Proper Elevation    -- Elevate limb on a stable stack of blankets / pillows so leg is higher than heart. -- Do this for 45 minutes, 2-3 times a day  -- You may apply ice for the first 15 minutes, then remove it while you continue your elevation  -- Be sure to lie flat in bed or on a couch with a comfortable pillow under your head. Lying back in a recliner is not helpful.  -- You may prop your head up if you cannot tolerate lying completely flat, but only as high as necessary for comfort.         Exercises  Supine Heel Slide - 2 x daily - 3 sets - 10 reps  Supine Quad Set - 2 x daily - 3 sets - 10-15 reps - 5 sec hold  Supine Straight Leg Raises - 2 x daily - 3 sets - 10 reps      X = TO BE PERFORMED NEXT VISIT  > = PROGRESS TO THIS    S: Patient exercising 2x/daily and reports it will get real sore during and after exercises. 3 weeks post op. O:    Time  0349-5694      Visit  2/8 (-12) Repeat outcome measure at mid point and end. Pain    2/10     ROM  105 flex, -8 ext Measure next    Modalities          MO   Manual      Stretching knee flexion   MT   Stretching       Patella mobs 30 reps  TE   Prone hangs   TE   Heel props 3 min   TE   Knee flex stretch-seated 1 x 30 sec hold  Discontinue  TE   Prone self flexion stretch   TE   Exercise       Nustep  L5 x 10 min  TE   Quad sets 5 sec hold 3 x 10 reps Discontinue  TE   Heel slides 3 x 10  \" TE   SLR 3 x 10 \" TE   LAQ 3 x 10, 5 sec hold  TE   Marching Next  TA   CR \"     Squat  \"  TA   Step-ups - FWD  \"  TA   Step-ups - LAT   TA   Step-ups - BWD    TA   Step up and over reciprocally    TA   [x] TG  [] Leg Press 2-leg Next  TE   [] TG  [] Leg Press 1-leg   TE      TE   Marching gait   NR   Side stepping   NR               A: Tolerated well. Discussed anatomy, physiology, body mechanics, principles of loading, and progressive loading/activity. Reviewed home exercise program extensively along with instructions for ice and elevation; written copy provided.     P: Continue with rehab plan  Gershon Cushing, PTA    Treatment Charges: Mins Units   Initial Evaluation     Re-Evaluation     Ther Exercise         TE 25 2   Manual Therapy     MT     Ther Activities        TA 15 1   Gait Training          GT     Neuro Re-education NR     Modalities     Non-Billable Service Time     Other     Total Time/Units 40 3

## 2022-12-08 ENCOUNTER — TREATMENT (OUTPATIENT)
Dept: PHYSICAL THERAPY | Age: 62
End: 2022-12-08

## 2022-12-08 DIAGNOSIS — M22.2X1 PATELLOFEMORAL DISORDER OF RIGHT KNEE: ICD-10-CM

## 2022-12-08 DIAGNOSIS — S83.206A TEAR OF MENISCUS OF RIGHT KNEE, UNSPECIFIED MENISCUS, UNSPECIFIED TEAR TYPE, UNSPECIFIED WHETHER OLD OR CURRENT TEAR: Primary | ICD-10-CM

## 2022-12-08 NOTE — PROGRESS NOTES
Physical Therapy Daily Treatment Note    Date: 2022  Patient Name: Jeanne Guevara  : 1960   MRN: [id number]  DOInjury: 2022  DOSx: 2022    Referring Provider:   Ny Malagon DO   [facility] [address] Diagnosis:    Diagnosis Orders   1. Tear of meniscus of right knee, unspecified meniscus, unspecified tear type, unspecified whether old or current tear        2. Patellofemoral disorder of right knee            TITLE OF OPERATION:   1. Right knee arthroscopic chondroplasty  2. Right knee arthroscopic synovectomy       Lenore Vaughn is 2 1/2 weeks post arthroscopic knee surgery (chondroplasty, synovectomy)  Patient has moderate limitations in both flexion and extension as well as mild edema along with impaired strength, function, gait, weightbearing tolerance. Treatment will start with methods to control swelling as well as AROM and stretching. Progression to strengthening, functional training, gait training, gait device advancement, and balance / proprioception exercises once motion and edema are under control. Outcome Measure: Lower Extremity Functional Scale (LEFS) 79% impairment      Access Code: [email or web address]  URL: [email or web address]  Date: 2022  Prepared by: Regis Oconnell    Program Notes  Proper Elevation    -- Elevate limb on a stable stack of blankets / pillows so leg is higher than heart. -- Do this for 45 minutes, 2-3 times a day  -- You may apply ice for the first 15 minutes, then remove it while you continue your elevation  -- Be sure to lie flat in bed or on a couch with a comfortable pillow under your head. Lying back in a recliner is not helpful.  -- You may prop your head up if you cannot tolerate lying completely flat, but only as high as necessary for comfort.         Exercises  Supine Heel Slide - 2 x daily - 3 sets - 10 reps  Supine Quad Set - 2 x daily - 3 sets - 10-15 reps - 5 sec hold  Supine Straight Leg Raises - 2 x daily - 3 sets - 10 reps      X = TO BE PERFORMED NEXT VISIT  > = PROGRESS TO THIS    S: Patient reports no changes, doing good. O:    Time  2832-0940      Visit  3/8 (-12) Repeat outcome measure at mid point and end. Pain    2/10     ROM  110 flex, -3 ext 12/8/2022    Modalities          MO   Manual      Stretching knee flexion   MT   Stretching       Patella mobs 30 reps  TE   Prone hangs   TE   Heel props 3 min   TE   Knee flex stretch-seated  TE   Prone self flexion stretch   TE   Exercise       Nustep  L5 x 10 min  TE   Quad sets  TE   Heel slides  TE   SLR  TE   LAQ 3 x 10, 5 sec hold  TE   Marching 2 x 15 reps  TA   CR 2 x 15 reps     Squat  \"  TA   Step-ups - FWD  \"  TA   Step-ups - LAT   TA   Step-ups - BWD    TA   Step up and over reciprocally    TA   [x] TG  [] Leg Press 2-leg L16 3 x 10   TE   [] TG  [] Leg Press 1-leg   TE      TE   Marching gait   NR   Side stepping   NR               A: Tolerated well. Discussed anatomy, physiology, body mechanics, principles of loading, and progressive loading/activity. Reviewed home exercise program extensively along with instructions for ice and elevation; written copy provided.     P: Continue with rehab plan  Dianelys Dyson PTA    Treatment Charges: Mins Units   Initial Evaluation     Re-Evaluation     Ther Exercise         TE 20 1   Manual Therapy     MT     Ther Activities        TA 20 1   Gait Training          GT     Neuro Re-education NR     Modalities     Non-Billable Service Time     Other     Total Time/Units 40 2

## 2022-12-15 ENCOUNTER — TREATMENT (OUTPATIENT)
Dept: PHYSICAL THERAPY | Age: 62
End: 2022-12-15

## 2022-12-15 DIAGNOSIS — S83.206A TEAR OF MENISCUS OF RIGHT KNEE, UNSPECIFIED MENISCUS, UNSPECIFIED TEAR TYPE, UNSPECIFIED WHETHER OLD OR CURRENT TEAR: Primary | ICD-10-CM

## 2022-12-15 DIAGNOSIS — M22.2X1 PATELLOFEMORAL DISORDER OF RIGHT KNEE: ICD-10-CM

## 2022-12-15 NOTE — PROGRESS NOTES
Physical Therapy Daily Treatment Note    Date: 12/15/2022  Patient Name: Valeri Garcia  : 1960   MRN: [id number]  DOInjury: 2022  DOSx: 2022    Referring Provider:   Gabo Mccloud DO   [address] Diagnosis:    Diagnosis Orders   1. Tear of meniscus of right knee, unspecified meniscus, unspecified tear type, unspecified whether old or current tear        2. Patellofemoral disorder of right knee              TITLE OF OPERATION:   1. Right knee arthroscopic chondroplasty  2. Right knee arthroscopic synovectomy       Ace Adame is 2 1/2 weeks post arthroscopic knee surgery (chondroplasty, synovectomy)  Patient has moderate limitations in both flexion and extension as well as mild edema along with impaired strength, function, gait, weightbearing tolerance. Treatment will start with methods to control swelling as well as AROM and stretching. Progression to strengthening, functional training, gait training, gait device advancement, and balance / proprioception exercises once motion and edema are under control. Outcome Measure: Lower Extremity Functional Scale (LEFS) 79% impairment      Access Code: [email or web address]  URL: [email or web address]  Date: 2022  Prepared by: Desiree Greco    Program Notes  Proper Elevation    -- Elevate limb on a stable stack of blankets / pillows so leg is higher than heart. -- Do this for 45 minutes, 2-3 times a day  -- You may apply ice for the first 15 minutes, then remove it while you continue your elevation  -- Be sure to lie flat in bed or on a couch with a comfortable pillow under your head. Lying back in a recliner is not helpful.  -- You may prop your head up if you cannot tolerate lying completely flat, but only as high as necessary for comfort.         Exercises  Supine Heel Slide - 2 x daily - 3 sets - 10 reps  Supine Quad Set - 2 x daily - 3 sets - 10-15 reps - 5 sec hold  Supine Straight Leg Raises - 2 x daily - 3 sets - 10 reps      X = TO BE PERFORMED NEXT VISIT  > = PROGRESS TO THIS    S: Patient had increased pain since last visit and feels it is from the total gym. She had some swelling as well. She got a block performed yesterday. O:    Time  2861-8812      Visit  4/8 (-12) Repeat outcome measure at mid point and end. Pain    2/10     ROM  110 flex, -3 ext 12/8/2022    Modalities          MO   Manual      Stretching knee flexion   MT   Stretching       Patella mobs 30 reps  TE   Prone hangs   TE   Heel props 3 min   TE   Knee flex stretch-seated  TE   Prone self flexion stretch   TE   Exercise       Nustep  L5 x 10 min  TE   Quad sets  TE   Heel slides  TE   SLR  TE   LAQ 3 x 10, 5 sec hold  TE   Marching 2 x 15 reps  TA   CR 2 x 15 reps     Squat  \"  TA   Step-ups - FWD  7\" 25 reps  TA   Step-ups - LAT 7\" 25 reps  TA   Step-ups - BWD    TA   Step up and over reciprocally    TA   [x] TG  [] Leg Press 2-leg  TE   [] TG  [x] Leg Press 2-leg 20# 3 x 10  TE      TE   Marching gait   NR   Side stepping   NR               A: Tolerated well. Discussed anatomy, physiology, body mechanics, principles of loading, and progressive loading/activity. Reviewed home exercise program extensively along with instructions for ice and elevation; written copy provided.     P: Continue with rehab plan  Jose Manuel Arvizu PTA    Treatment Charges: Mins Units   Initial Evaluation     Re-Evaluation     Ther Exercise         TE 20 1   Manual Therapy     MT     Ther Activities        TA 20 1   Gait Training          GT     Neuro Re-education NR     Modalities     Non-Billable Service Time     Other     Total Time/Units 40 2

## 2022-12-20 ENCOUNTER — TREATMENT (OUTPATIENT)
Dept: PHYSICAL THERAPY | Age: 62
End: 2022-12-20
Payer: MEDICARE

## 2022-12-20 DIAGNOSIS — S83.206A TEAR OF MENISCUS OF RIGHT KNEE, UNSPECIFIED MENISCUS, UNSPECIFIED TEAR TYPE, UNSPECIFIED WHETHER OLD OR CURRENT TEAR: Primary | ICD-10-CM

## 2022-12-20 DIAGNOSIS — M22.2X1 PATELLOFEMORAL DISORDER OF RIGHT KNEE: ICD-10-CM

## 2022-12-20 PROCEDURE — 97530 THERAPEUTIC ACTIVITIES: CPT

## 2022-12-20 PROCEDURE — 97110 THERAPEUTIC EXERCISES: CPT

## 2022-12-20 NOTE — PROGRESS NOTES
Physical Therapy Daily Treatment Note    Date: 2022  Patient Name: Doretha Venegas  : 1960   MRN: [id number]  DOInjury: 2022  DOSx: 2022    Referring Provider:   April Fuentes DO   [address] Diagnosis:    Diagnosis Orders   1. Tear of meniscus of right knee, unspecified meniscus, unspecified tear type, unspecified whether old or current tear        2. Patellofemoral disorder of right knee              TITLE OF OPERATION:   1. Right knee arthroscopic chondroplasty  2. Right knee arthroscopic synovectomy       [facility] is 2 1/2 weeks post arthroscopic knee surgery (chondroplasty, synovectomy)  Patient has moderate limitations in both flexion and extension as well as mild edema along with impaired strength, function, gait, weightbearing tolerance. Treatment will start with methods to control swelling as well as AROM and stretching. Progression to strengthening, functional training, gait training, gait device advancement, and balance / proprioception exercises once motion and edema are under control. Outcome Measure: Lower Extremity Functional Scale (LEFS) 79% impairment      Access Code: [email or web address]  URL: [email or web address]  Date: 2022  Prepared by: Christian Garcia    Program Notes  Proper Elevation    -- Elevate limb on a stable stack of blankets / pillows so leg is higher than heart. -- Do this for 45 minutes, 2-3 times a day  -- You may apply ice for the first 15 minutes, then remove it while you continue your elevation  -- Be sure to lie flat in bed or on a couch with a comfortable pillow under your head. Lying back in a recliner is not helpful.  -- You may prop your head up if you cannot tolerate lying completely flat, but only as high as necessary for comfort.         Exercises  Supine Heel Slide - 2 x daily - 3 sets - 10 reps  Supine Quad Set - 2 x daily - 3 sets - 10-15 reps - 5 sec hold  Supine Straight Leg Raises - 2 x daily - 3 sets - 10 reps      X = TO BE PERFORMED NEXT VISIT  > = PROGRESS TO THIS    S: Patient reports no pain today. Last night was \"rough\" because she stretched it for longer duration. O:    Time  4898-5438      Visit  5/8 (-12) Repeat outcome measure at mid point and end. Pain    0/10     ROM  110 flex, -3 ext 12/8/2022    Modalities          MO   Manual      Stretching knee flexion   MT   Stretching       Patella mobs 30 reps  TE   Prone hangs   TE   Heel props 2# x 3 min   TE   Knee flex stretch-seated  TE   Prone self flexion stretch   TE   Exercise       Nustep  L5 x 10 min  TE   Quad sets  TE   Heel slides  TE   SLR  TE   LAQ 2# 3 x 10, 5 sec hold  TE   Marching 2 x 15 reps  TA   CR 2 x 15 reps     Squat  \"  TA   Step-ups - FWD  7\" 25 reps  TA   Step-ups - LAT 7\" 25 reps  TA   Step-ups - BWD    TA   Step up and over reciprocally    TA   [x] TG  [] Leg Press 2-leg  TE   [] TG  [x] Leg Press 2-leg 20# 3 x 12  TE      TE   Marching gait   NR   Side stepping   NR               A: Tolerated well. Discussed anatomy, physiology, body mechanics, principles of loading, and progressive loading/activity. Reviewed home exercise program extensively along with instructions for ice and elevation; written copy provided.     P: Continue with rehab plan  Ivette Gill PTA    Treatment Charges: Mins Units   Initial Evaluation     Re-Evaluation     Ther Exercise         TE 15 1   Manual Therapy     MT     Ther Activities        TA 25 2   Gait Training          GT     Neuro Re-education NR     Modalities     Non-Billable Service Time     Other     Total Time/Units 40 3

## 2022-12-22 ENCOUNTER — TREATMENT (OUTPATIENT)
Dept: PHYSICAL THERAPY | Age: 62
End: 2022-12-22
Payer: MEDICARE

## 2022-12-22 DIAGNOSIS — M22.2X1 PATELLOFEMORAL DISORDER OF RIGHT KNEE: ICD-10-CM

## 2022-12-22 DIAGNOSIS — S83.206A TEAR OF MENISCUS OF RIGHT KNEE, UNSPECIFIED MENISCUS, UNSPECIFIED TEAR TYPE, UNSPECIFIED WHETHER OLD OR CURRENT TEAR: Primary | ICD-10-CM

## 2022-12-22 PROCEDURE — 97110 THERAPEUTIC EXERCISES: CPT

## 2022-12-22 PROCEDURE — 97530 THERAPEUTIC ACTIVITIES: CPT

## 2022-12-22 NOTE — PROGRESS NOTES
Physical Therapy Daily Treatment Note    Date: 2022  Patient Name: Ruthie Caraballo  : 1960   MRN: [id number]  DOInjury: 2022  DOSx: 2022    Referring Provider:   Myrtle Neal DO   [address] Diagnosis:    Diagnosis Orders   1. Tear of meniscus of right knee, unspecified meniscus, unspecified tear type, unspecified whether old or current tear        2. Patellofemoral disorder of right knee                TITLE OF OPERATION:   1. Right knee arthroscopic chondroplasty  2. Right knee arthroscopic synovectomy       Marshel Jovanni is 2 1/2 weeks post arthroscopic knee surgery (chondroplasty, synovectomy)  Patient has moderate limitations in both flexion and extension as well as mild edema along with impaired strength, function, gait, weightbearing tolerance. Treatment will start with methods to control swelling as well as AROM and stretching. Progression to strengthening, functional training, gait training, gait device advancement, and balance / proprioception exercises once motion and edema are under control. Outcome Measure: Lower Extremity Functional Scale (LEFS) 79% impairment      Access Code: [email or web address]  URL: [email or web address]  Date: 2022  Prepared by: Sunday Giraldo    Program Notes  Proper Elevation    -- Elevate limb on a stable stack of blankets / pillows so leg is higher than heart. -- Do this for 45 minutes, 2-3 times a day  -- You may apply ice for the first 15 minutes, then remove it while you continue your elevation  -- Be sure to lie flat in bed or on a couch with a comfortable pillow under your head. Lying back in a recliner is not helpful.  -- You may prop your head up if you cannot tolerate lying completely flat, but only as high as necessary for comfort.         Exercises  Supine Heel Slide - 2 x daily - 3 sets - 10 reps  Supine Quad Set - 2 x daily - 3 sets - 10-15 reps - 5 sec hold  Supine Straight Leg Raises - 2 x daily - 3 sets - 10 reps      X = TO BE PERFORMED NEXT VISIT  > = PROGRESS TO THIS    S: Patient reports increased pain from doing a lot of cleaning yesterday. She woke up with pain in anterior knee. However, does feel like it has more motion. O:    Time  2266-6019      Visit  6/8 (-12) Repeat outcome measure at mid point and end. Pain    5/10     ROM  110 flex, 0 ext 12/22/2022    Modalities          MO   Manual      Stretching knee flexion   MT   Stretching       Patella mobs 30 reps  TE   Prone hangs   TE   Heel props 2# x 5 min   TE   Knee flex stretch-seated  TE   Prone self flexion stretch   TE   Exercise       Nustep  L5 x 10 min  TE   Quad sets  TE   Heel slides  TE   SLR  TE   LAQ 2# 3 x 10, 5 sec hold  TE   Marching 2 x 15 reps  TA   CR 2 x 15 reps     Squat  \"  TA   Step-ups - FWD  7\" 25 reps  TA   Step-ups - LAT 7\" 25 reps  TA   Step-ups - BWD    TA   Step up and over reciprocally    TA   [x] TG  [] Leg Press 2-leg  TE   [] TG  [x] Leg Press 2-leg 20# 3 x 12 Increase TE      TE   Marching gait   NR   Side stepping   NR               A: Tolerated well. Discussed anatomy, physiology, body mechanics, principles of loading, and progressive loading/activity. Reviewed home exercise program extensively along with instructions for ice and elevation; written copy provided. P: Continue with rehab plan.  Next session will be last.   Woody Hernandez PTA    Treatment Charges: Mins Units   Initial Evaluation     Re-Evaluation     Ther Exercise         TE 15 1   Manual Therapy     MT     Ther Activities        TA 25 2   Gait Training          GT     Neuro Re-education NR     Modalities     Non-Billable Service Time     Other     Total Time/Units 40 3

## 2022-12-28 ENCOUNTER — OFFICE VISIT (OUTPATIENT)
Dept: ORTHOPEDIC SURGERY | Age: 62
End: 2022-12-28

## 2022-12-28 ENCOUNTER — TREATMENT (OUTPATIENT)
Dept: PHYSICAL THERAPY | Age: 62
End: 2022-12-28
Payer: MEDICARE

## 2022-12-28 VITALS — BODY MASS INDEX: 45.64 KG/M2 | TEMPERATURE: 98 F | HEIGHT: 66 IN | WEIGHT: 284 LBS

## 2022-12-28 DIAGNOSIS — S83.206A TEAR OF MENISCUS OF RIGHT KNEE, UNSPECIFIED MENISCUS, UNSPECIFIED TEAR TYPE, UNSPECIFIED WHETHER OLD OR CURRENT TEAR: Primary | ICD-10-CM

## 2022-12-28 DIAGNOSIS — M22.2X1 PATELLOFEMORAL DISORDER OF RIGHT KNEE: ICD-10-CM

## 2022-12-28 PROCEDURE — 99024 POSTOP FOLLOW-UP VISIT: CPT | Performed by: ORTHOPAEDIC SURGERY

## 2022-12-28 PROCEDURE — 97530 THERAPEUTIC ACTIVITIES: CPT

## 2022-12-28 PROCEDURE — 97110 THERAPEUTIC EXERCISES: CPT

## 2022-12-28 NOTE — PROGRESS NOTES
Physical Therapy Daily Treatment Note    Date: 2022  Patient Name: Sofia Geller  : 1960   MRN: [id number]  DOInjury: 2022  DOSx: 2022    Referring Provider:   Jana Ivey DO   [address]       Medical Diagnosis:    Diagnosis Orders   1. Tear of meniscus of right knee, unspecified meniscus, unspecified tear type, unspecified whether old or current tear        2. Patellofemoral disorder of right knee              TITLE OF OPERATION:   1. Right knee arthroscopic chondroplasty  2. Right knee arthroscopic synovectomy       Purvis Libman is 2 1/2 weeks post arthroscopic knee surgery (chondroplasty, synovectomy)  Patient has moderate limitations in both flexion and extension as well as mild edema along with impaired strength, function, gait, weightbearing tolerance. Treatment will start with methods to control swelling as well as AROM and stretching. Progression to strengthening, functional training, gait training, gait device advancement, and balance / proprioception exercises once motion and edema are under control. Outcome Measure: Lower Extremity Functional Scale (LEFS) 79% impairment      Access Code: [email or web address]  URL: [email or web address]  Date: 2022  Prepared by: Yen Santos    Program Notes  Proper Elevation    -- Elevate limb on a stable stack of blankets / pillows so leg is higher than heart. -- Do this for 45 minutes, 2-3 times a day  -- You may apply ice for the first 15 minutes, then remove it while you continue your elevation  -- Be sure to lie flat in bed or on a couch with a comfortable pillow under your head. Lying back in a recliner is not helpful.  -- You may prop your head up if you cannot tolerate lying completely flat, but only as high as necessary for comfort.         Exercises  Supine Heel Slide - 2 x daily - 3 sets - 10 reps  Supine Quad Set - 2 x daily - 3 sets - 10-15 reps - 5 sec hold  Supine Straight Leg Raises - 2 x daily - 3 sets - 10 reps      X = TO BE PERFORMED NEXT VISIT  > = PROGRESS TO THIS    S: Patient reports she feels less stable first thing in morning but after first couple steps she's good. O:    Time  8111-3838      Visit  7/8 (-12) Repeat outcome measure at mid point and end. Pain    5/10     ROM  110 flex, 0 ext 12/22/2022    Modalities          MO   Manual      Stretching knee flexion   MT   Stretching       Patella mobs 30 reps  TE   Prone hangs   TE   Heel props 3# x 5 min   TE   Knee flex stretch-seated  TE   Prone self flexion stretch   TE   Exercise       Nustep  L5 x 10 min  TE   Quad sets  TE   Heel slides  TE   SLR  TE   LAQ 2# 3 x 10, 5 sec hold  TE   Marching 2 x 15 reps  TA   CR 2 x 15 reps     Squat  \"  TA   Step-ups - FWD  7\" 25 reps  TA   Step-ups - LAT 7\" 25 reps  TA   Step-ups - BWD    TA   Step up and over reciprocally    TA   [x] TG  [] Leg Press 2-leg  TE   [] TG  [x] Leg Press 2-leg 20# 3 x 15  TE      TE   Marching gait   NR   Side stepping   NR               A: Tolerated well. Discussed anatomy, physiology, body mechanics, principles of loading, and progressive loading/activity. Reviewed home exercise program extensively along with instructions for ice and elevation; written copy provided. P: Continue with rehab plan.  Next session will be last.   Destiny Urban PTA    Treatment Charges: Mins Units   Initial Evaluation     Re-Evaluation     Ther Exercise         TE 15 1   Manual Therapy     MT     Ther Activities        TA 25 2   Gait Training          GT     Neuro Re-education NR     Modalities     Non-Billable Service Time     Other     Total Time/Units 40 3

## 2022-12-28 NOTE — PROGRESS NOTES
Chief Complaint   Patient presents with    Knee Pain     Right knee arthroscopy f/u doing well some soreness on and off but is better. Subjective:        Magdalene Saldana is here for follow-up after left knee arthroscopy. Findings at surgery: medial meniscus tear,oa, synovitis. Pain is controlled without any medications. The patient denies fever, wound drainage, increasing redness, pus, increasing pain, increasing swelling. Post op problems reported: none. She is ambulating without cane. She has been performing physical therapy and doing much better. Objective:           General :    alert, appears stated age, and cooperative   Gait:  Normal.   Sutures:   Sutures out. Incision:  healing well, no significant drainage, no dehiscence, no significant erythema   Tenderness:  none   Flexion ROM:  full range of motion   Extension ROM:  diminished range of motion   Effusion:  mild   DVT Evaluation:  No evidence of DVT seen on physical exam.           Assessment:     Dyann January was seen today for knee pain. Diagnoses and all orders for this visit:    Tear of meniscus of right knee, unspecified meniscus, unspecified tear type, unspecified whether old or current tear  -     [facility]           Plan:      Surgical pictures from the surgery were reveiwed with the patient  Sutures removed today. Begin local wound cares. Wound care discussed. Range of motion and rehabilitation exercises discussed with the patient. Physical Therapy for post-operative rehabilitation. Full weight bearing. Follow up: 8 weeks.        Curzito Augustin DO

## 2022-12-30 ENCOUNTER — TREATMENT (OUTPATIENT)
Dept: PHYSICAL THERAPY | Age: 62
End: 2022-12-30
Payer: MEDICARE

## 2022-12-30 DIAGNOSIS — S83.206A TEAR OF MENISCUS OF RIGHT KNEE, UNSPECIFIED MENISCUS, UNSPECIFIED TEAR TYPE, UNSPECIFIED WHETHER OLD OR CURRENT TEAR: Primary | ICD-10-CM

## 2022-12-30 DIAGNOSIS — M22.2X1 PATELLOFEMORAL DISORDER OF RIGHT KNEE: ICD-10-CM

## 2022-12-30 PROCEDURE — 97530 THERAPEUTIC ACTIVITIES: CPT

## 2022-12-30 PROCEDURE — 97110 THERAPEUTIC EXERCISES: CPT

## 2022-12-30 NOTE — PROGRESS NOTES
Physical Therapy Daily Treatment Note    Date: 2022  Patient Name: Mackenzie Adams  : 1960   MRN: [id number]  DOInjury: 2022  DOSx: 2022    Referring Provider:   Usman Martinez DO   [address] Diagnosis:    Diagnosis Orders   1. Tear of meniscus of right knee, unspecified meniscus, unspecified tear type, unspecified whether old or current tear        2. Patellofemoral disorder of right knee            TITLE OF OPERATION:   1. Right knee arthroscopic chondroplasty  2. Right knee arthroscopic synovectomy       [place] is 2 1/2 weeks post arthroscopic knee surgery (chondroplasty, synovectomy)  Patient has moderate limitations in both flexion and extension as well as mild edema along with impaired strength, function, gait, weightbearing tolerance. Treatment will start with methods to control swelling as well as AROM and stretching. Progression to strengthening, functional training, gait training, gait device advancement, and balance / proprioception exercises once motion and edema are under control. Outcome Measure: Lower Extremity Functional Scale (LEFS) 79% impairment      Access Code: [email or web address]  URL: [email or web address]  Date: 2022  Prepared by: Voncille Pump    Program Notes  Proper Elevation    -- Elevate limb on a stable stack of blankets / pillows so leg is higher than heart. -- Do this for 45 minutes, 2-3 times a day  -- You may apply ice for the first 15 minutes, then remove it while you continue your elevation  -- Be sure to lie flat in bed or on a couch with a comfortable pillow under your head. Lying back in a recliner is not helpful.  -- You may prop your head up if you cannot tolerate lying completely flat, but only as high as necessary for comfort.         Exercises  Supine Heel Slide - 2 x daily - 3 sets - 10 reps  Supine Quad Set - 2 x daily - 3 sets - 10-15 reps - 5 sec hold  Supine Straight Leg Raises - 2 x daily - 3 sets - 10 reps      X = TO BE PERFORMED NEXT VISIT  > = PROGRESS TO THIS    S: Patient reports she is having reports she feels less stable first thing in morning but after first couple steps she's good. Feels \"wobbly\" when she turns. O:    Time  6685-1170      Visit  8/8 (-12) Repeat outcome measure at mid point and end. Pain    5/10     ROM  110 flex, 0 ext 12/22/2022    Modalities          MO   Manual      Stretching knee flexion   MT   Stretching       Patella mobs 30 reps  TE   Prone hangs   TE   Heel props 3# x 5 min   TE   Knee flex stretch-seated  TE   Prone self flexion stretch   TE   Exercise       Nustep  L5 x 10 min  TE   Quad sets  TE   Heel slides  TE   SLR  TE   LAQ 3# 3 x 10, 5 sec hold  TE   Marching 2 x 15 reps  TA   CR 2 x 15 reps     Squat  2 x 10 reps  TA   Step-ups - FWD  7\" 25 reps  TA   Step-ups - LAT 7\" 25 reps  TA   Step-ups - BWD    TA   Step up and over reciprocally    TA   [x] TG  [] Leg Press 2-leg  TE   [] TG  [x] Leg Press 2-leg 20# 3 x 15  TE      TE   Marching gait   NR   Side stepping   NR               A: Tolerated well. Discussed anatomy, physiology, body mechanics, principles of loading, and progressive loading/activity. Reviewed home exercise program extensively along with instructions for ice and elevation; written copy provided. P: Continue with rehab plan. Doctor wants patient to continue PT; will shift focus to strengthening due to patient's concerns of instability.    Frandy Patel PTA    Treatment Charges: Mins Units   Initial Evaluation     Re-Evaluation     Ther Exercise         TE 20 1   Manual Therapy     MT     Ther Activities        TA 20 1   Gait Training          GT     Neuro Re-education NR     Modalities     Non-Billable Service Time     Other     Total Time/Units 40 2

## 2023-01-09 ENCOUNTER — TREATMENT (OUTPATIENT)
Dept: PHYSICAL THERAPY | Age: 63
End: 2023-01-09

## 2023-01-09 DIAGNOSIS — M22.2X1 PATELLOFEMORAL DISORDER OF RIGHT KNEE: ICD-10-CM

## 2023-01-09 DIAGNOSIS — S83.206A TEAR OF MENISCUS OF RIGHT KNEE, UNSPECIFIED MENISCUS, UNSPECIFIED TEAR TYPE, UNSPECIFIED WHETHER OLD OR CURRENT TEAR: Primary | ICD-10-CM

## 2023-01-09 NOTE — PROGRESS NOTES
Physical Therapy Daily Treatment Note    Date: 2023  Patient Name: Megan Peña  : 1960   MRN: [id number]  DOInjury: 2022  DOSx: 2022    Referring Provider:   Claudetta Goodie, DO   [address] Diagnosis:    Diagnosis Orders   1. Tear of meniscus of right knee, unspecified meniscus, unspecified tear type, unspecified whether old or current tear        2. Patellofemoral disorder of right knee            TITLE OF OPERATION:   1. Right knee arthroscopic chondroplasty  2. Right knee arthroscopic synovectomy       Maryjo Gongora is 2 1/2 weeks post arthroscopic knee surgery (chondroplasty, synovectomy)  Patient has moderate limitations in both flexion and extension as well as mild edema along with impaired strength, function, gait, weightbearing tolerance. Treatment will start with methods to control swelling as well as AROM and stretching. Progression to strengthening, functional training, gait training, gait device advancement, and balance / proprioception exercises once motion and edema are under control. Outcome Measure: Lower Extremity Functional Scale (LEFS) 79% impairment      Access Code: [email or web address]  URL: [email or web address]  Date: 2022  Prepared by: Lenka Ho Notes  Proper Elevation    -- Elevate limb on a stable stack of blankets / pillows so leg is higher than heart. -- Do this for 45 minutes, 2-3 times a day  -- You may apply ice for the first 15 minutes, then remove it while you continue your elevation  -- Be sure to lie flat in bed or on a couch with a comfortable pillow under your head. Lying back in a recliner is not helpful.  -- You may prop your head up if you cannot tolerate lying completely flat, but only as high as necessary for comfort.         Exercises  Supine Heel Slide - 2 x daily - 3 sets - 10 reps  Supine Quad Set - 2 x daily - 3 sets - 10-15 reps - 5 sec hold  Supine Straight Leg Raises - 2 x daily - 3 sets - 10 reps      X = TO BE PERFORMED NEXT VISIT  > = PROGRESS TO THIS    S: Patient reports she is having trouble still with descending steps where she experiences pain in front of knee. She feels it could be from arthritis and spurring. O:    Time  9010-1541      Visit  9/8 (-12) Repeat outcome measure at mid point and end. Pain    5/10     ROM  110 flex, 0 ext 12/22/2022    Modalities          MO   Manual      Stretching knee flexion   MT   Stretching       Patella mobs 30 reps  TE   Prone hangs   TE   Heel props 3# x 5 min   TE   Knee flex stretch-seated  TE   Prone self flexion stretch   TE   Exercise       Nustep  L5 x 10 min  TE   Quad sets  TE   Heel slides  TE   SLR  TE   LAQ 3# 3 x 10, 5 sec hold  TE   Marching 3# 2 x 15 reps  TA   CR 3# 2 x 15 reps     Squat  3# 2 x 10 reps  TA   Step-ups - FWD  7\" 25 reps  TA   Step-ups - LAT 7\" 25 reps  TA   Step-ups - BWD    TA   Step up and over reciprocally    TA   [x] TG  [] Leg Press 2-leg  TE   [] TG  [x] Leg Press 2-leg 40# 3 x 15  TE      TE   Marching gait   NR   Side stepping   NR               A: Tolerated well. Discussed anatomy, physiology, body mechanics, principles of loading, and progressive loading/activity. Reviewed home exercise program extensively along with instructions for ice and elevation; written copy provided. P: Continue with rehab plan. Doctor wants patient to continue PT; will shift focus to strengthening due to patient's concerns of instability.    Aura Deal, PTA    Treatment Charges: Mins Units   Initial Evaluation     Re-Evaluation     Ther Exercise         TE 20 1   Manual Therapy     MT     Ther Activities        TA 20 1   Gait Training          GT     Neuro Re-education NR     Modalities     Non-Billable Service Time     Other     Total Time/Units 40 2

## 2023-01-11 ENCOUNTER — TREATMENT (OUTPATIENT)
Dept: PHYSICAL THERAPY | Age: 63
End: 2023-01-11

## 2023-01-11 DIAGNOSIS — S83.206A TEAR OF MENISCUS OF RIGHT KNEE, UNSPECIFIED MENISCUS, UNSPECIFIED TEAR TYPE, UNSPECIFIED WHETHER OLD OR CURRENT TEAR: Primary | ICD-10-CM

## 2023-01-11 DIAGNOSIS — M22.2X1 PATELLOFEMORAL DISORDER OF RIGHT KNEE: ICD-10-CM

## 2023-01-11 NOTE — PROGRESS NOTES
Physical Therapy Daily Treatment Note    Date: 2023  Patient Name: Radha Fisher  : 1960   MRN: [id number]  DOInjury: 2022  DOSx: 2022    Referring Provider:   Cruzito Augustin DO   [facility] [address] Diagnosis:    Diagnosis Orders   1. Tear of meniscus of right knee, unspecified meniscus, unspecified tear type, unspecified whether old or current tear        2. Patellofemoral disorder of right knee              TITLE OF OPERATION:   1. Right knee arthroscopic chondroplasty  2. Right knee arthroscopic synovectomy       [facility] is 2 1/2 weeks post arthroscopic knee surgery (chondroplasty, synovectomy)  Patient has moderate limitations in both flexion and extension as well as mild edema along with impaired strength, function, gait, weightbearing tolerance. Treatment will start with methods to control swelling as well as AROM and stretching. Progression to strengthening, functional training, gait training, gait device advancement, and balance / proprioception exercises once motion and edema are under control. Outcome Measure: Lower Extremity Functional Scale (LEFS) 79% impairment      Access Code: [email or web address]  URL: [email or web address]  Date: 2022  Prepared by: Marychuy No    Program Notes  Proper Elevation    -- Elevate limb on a stable stack of blankets / pillows so leg is higher than heart. -- Do this for 45 minutes, 2-3 times a day  -- You may apply ice for the first 15 minutes, then remove it while you continue your elevation  -- Be sure to lie flat in bed or on a couch with a comfortable pillow under your head. Lying back in a recliner is not helpful.  -- You may prop your head up if you cannot tolerate lying completely flat, but only as high as necessary for comfort.         Exercises  Supine Heel Slide - 2 x daily - 3 sets - 10 reps  Supine Quad Set - 2 x daily - 3 sets - 10-15 reps - 5 sec hold  Supine Straight Leg Raises - 2 x daily - 3 sets - 10 reps      X = TO BE PERFORMED NEXT VISIT  > = PROGRESS TO THIS    S: Patient reports she is sore from last visit from increased strengthening. O:    Time  1646-8275      Visit  10/8 (-12) Repeat outcome measure at mid point and end. Pain    5/10     ROM  110 flex, 0 ext 12/22/2022    Modalities          MO   Manual      Stretching knee flexion   MT   Stretching       Patella mobs 30 reps  TE   Prone hangs   TE   Heel props  TE   Knee flex stretch-seated  TE   Prone self flexion stretch   TE   Exercise       Nustep  L5 x 10 min  TE   Quad sets  TE   Heel slides  TE   SLR  TE   LAQ 3# 3 x 10, 5 sec hold  TE   Marching 3# 2 x 15 reps  TA   CR 3# 2 x 15 reps     Squat  3# 2 x 10 reps  TA   Step-ups - FWD  7\" 25 reps  TA   Step-ups - LAT 7\" 25 reps  TA   Step-ups - BWD    TA   Step up and over reciprocally    TA   [x] TG  [] Leg Press 2-leg  TE   [] TG  [x] Leg Press 2-leg 40# 3 x 15  TE      TE   Marching gait   NR   Side stepping   NR               A: Tolerated well. Discussed anatomy, physiology, body mechanics, principles of loading, and progressive loading/activity. Reviewed home exercise program extensively along with instructions for ice and elevation; written copy provided. P: Continue with rehab plan. Doctor wants patient to continue PT; will shift focus to strengthening due to patient's concerns of instability.    Dianelys Dyson PTA    Treatment Charges: Mins Units   Initial Evaluation     Re-Evaluation     Ther Exercise         TE 15 1   Manual Therapy     MT     Ther Activities        TA 15 1   Gait Training          GT     Neuro Re-education NR     Modalities     Non-Billable Service Time 10 0   Other     Total Time/Units 40 2

## 2023-01-16 ENCOUNTER — TREATMENT (OUTPATIENT)
Dept: PHYSICAL THERAPY | Age: 63
End: 2023-01-16
Payer: MEDICARE

## 2023-01-16 DIAGNOSIS — S83.206A TEAR OF MENISCUS OF RIGHT KNEE, UNSPECIFIED MENISCUS, UNSPECIFIED TEAR TYPE, UNSPECIFIED WHETHER OLD OR CURRENT TEAR: Primary | ICD-10-CM

## 2023-01-16 DIAGNOSIS — M22.2X1 PATELLOFEMORAL DISORDER OF RIGHT KNEE: ICD-10-CM

## 2023-01-16 PROCEDURE — 97110 THERAPEUTIC EXERCISES: CPT

## 2023-01-16 PROCEDURE — 97530 THERAPEUTIC ACTIVITIES: CPT

## 2023-01-16 NOTE — PROGRESS NOTES
Physical Therapy Daily Treatment Note    Date: 2023  Patient Name: Polo Carmona  : 1960   MRN: [id number]  DOInjury: 2022  DOSx: 2022    Referring Provider:   Nieves Cast DO   [facility] [place] [address] Diagnosis:    Diagnosis Orders   1. Tear of meniscus of right knee, unspecified meniscus, unspecified tear type, unspecified whether old or current tear        2. Patellofemoral disorder of right knee            TITLE OF OPERATION:   1. Right knee arthroscopic chondroplasty  2. Right knee arthroscopic synovectomy       Albert Garza is 2 1/2 weeks post arthroscopic knee surgery (chondroplasty, synovectomy)  Patient has moderate limitations in both flexion and extension as well as mild edema along with impaired strength, function, gait, weightbearing tolerance. Treatment will start with methods to control swelling as well as AROM and stretching. Progression to strengthening, functional training, gait training, gait device advancement, and balance / proprioception exercises once motion and edema are under control. Outcome Measure: Lower Extremity Functional Scale (LEFS) 79% impairment      Access Code: [email or web address]  URL: [email or web address]  Date: 2022  Prepared by: Brayan Ho Notes  Proper Elevation    -- Elevate limb on a stable stack of blankets / pillows so leg is higher than heart. -- Do this for 45 minutes, 2-3 times a day  -- You may apply ice for the first 15 minutes, then remove it while you continue your elevation  -- Be sure to lie flat in bed or on a couch with a comfortable pillow under your head. Lying back in a recliner is not helpful.  -- You may prop your head up if you cannot tolerate lying completely flat, but only as high as necessary for comfort.         Exercises  Supine Heel Slide - 2 x daily - 3 sets - 10 reps  Supine Quad Set - 2 x daily - 3 sets - 10-15 reps - 5 sec hold  Supine Straight Leg Raises - 2 x daily - 3 sets - 10 reps      X = TO BE PERFORMED NEXT VISIT  > = PROGRESS TO THIS    S: Patient reports she was hurting more over weekend and not sure why.  O:    Time  1338-1562      Visit  11/8 (-12) Repeat outcome measure at mid point and end. Pain    5/10     ROM  110 flex, 0 ext 12/22/2022    Modalities          MO   Manual      Stretching knee flexion   MT   Stretching       Patella mobs 30 reps  TE   Prone hangs   TE   Heel props  TE   Knee flex stretch-seated  TE   Prone self flexion stretch   TE   Exercise       Nustep  L5 x 10 min  TE   Quad sets  TE   Heel slides  TE   SLR  TE   LAQ 5# 3 x 10, 5 sec hold  TE   Marching 3# 2 x 15 reps  TA   CR 3# 2 x 15 reps     Squat  3# 2 x 10 reps  TA   Step-ups - FWD  7\" 25 reps  TA   Step-ups - LAT 7\" 25 reps  TA   Step-ups - BWD  7\" 10 reps  TA   Step up and over reciprocally    TA   [x] TG  [] Leg Press 2-leg  TE   [] TG  [x] Leg Press 2-leg 40# 3 x 15  TE      TE   Marching gait   NR   Side stepping   NR               A: Tolerated well. Discussed anatomy, physiology, body mechanics, principles of loading, and progressive loading/activity. Reviewed home exercise program extensively along with instructions for ice and elevation; written copy provided. P: Continue with rehab plan. Doctor wants patient to continue PT; will shift focus to strengthening due to patient's concerns of instability.    Moise Ruiz, PTA    Treatment Charges: Mins Units   Initial Evaluation     Re-Evaluation     Ther Exercise         TE 15 1   Manual Therapy     MT     Ther Activities        TA 15 1   Gait Training          GT     Neuro Re-education NR     Modalities     Non-Billable Service Time 10 0   Other     Total Time/Units 40 2

## 2023-01-18 ENCOUNTER — TREATMENT (OUTPATIENT)
Dept: PHYSICAL THERAPY | Age: 63
End: 2023-01-18

## 2023-01-18 DIAGNOSIS — S83.206A TEAR OF MENISCUS OF RIGHT KNEE, UNSPECIFIED MENISCUS, UNSPECIFIED TEAR TYPE, UNSPECIFIED WHETHER OLD OR CURRENT TEAR: Primary | ICD-10-CM

## 2023-01-18 DIAGNOSIS — M22.2X1 PATELLOFEMORAL DISORDER OF RIGHT KNEE: ICD-10-CM

## 2023-01-18 NOTE — PROGRESS NOTES
Physical Therapy Daily Treatment Note    Date: 2023  Patient Name: Nancy Ha  : 1960   MRN: [id number]  DOInjury: 2022  DOSx: 2022    Referring Provider:   Olga Graf DO   [address] [facility] [address]         Medical Diagnosis:    Diagnosis Orders   1. Tear of meniscus of right knee, unspecified meniscus, unspecified tear type, unspecified whether old or current tear        2. Patellofemoral disorder of right knee              TITLE OF OPERATION:   1. Right knee arthroscopic chondroplasty  2. Right knee arthroscopic synovectomy       Asim Anglin is 2 1/2 weeks post arthroscopic knee surgery (chondroplasty, synovectomy)  Patient has moderate limitations in both flexion and extension as well as mild edema along with impaired strength, function, gait, weightbearing tolerance. Treatment will start with methods to control swelling as well as AROM and stretching. Progression to strengthening, functional training, gait training, gait device advancement, and balance / proprioception exercises once motion and edema are under control. Outcome Measure: Lower Extremity Functional Scale (LEFS) 79% impairment      Access Code: [email or web address]  URL: [email or web address]  Date: 2022  Prepared by: Gurwinder Snell    Program Notes  Proper Elevation    -- Elevate limb on a stable stack of blankets / pillows so leg is higher than heart. -- Do this for 45 minutes, 2-3 times a day  -- You may apply ice for the first 15 minutes, then remove it while you continue your elevation  -- Be sure to lie flat in bed or on a couch with a comfortable pillow under your head. Lying back in a recliner is not helpful.  -- You may prop your head up if you cannot tolerate lying completely flat, but only as high as necessary for comfort.         Exercises  Supine Heel Slide - 2 x daily - 3 sets - 10 reps  Supine Quad Set - 2 x daily - 3 sets - 10-15 reps - 5 sec hold  Supine Straight Leg Raises - 2 x daily - 3 sets - 10 reps      X = TO BE PERFORMED NEXT VISIT  > = PROGRESS TO THIS    S: Patient reports she was hurting more over weekend and not sure why.  O:    Time  4767-9099      Visit  12 Repeat outcome measure at mid point and end. Pain    5/10     ROM  110 flex, 0 ext 12/22/2022    Modalities          MO   Manual      Stretching knee flexion   MT   Stretching       Patella mobs 30 reps  TE   Prone hangs   TE   Heel props  TE   Knee flex stretch-seated  TE   Prone self flexion stretch   TE   Exercise       Nustep  L5 x 10 min  TE   Quad sets  TE   Heel slides  TE   SLR  TE   LAQ 5# 3 x 10, 5 sec hold  TE   Marching 3# 2 x 15 reps  TA   CR 3# 2 x 15 reps     Squat  3# 2 x 10 reps  TA   Step-ups - FWD  7\" 25 reps  TA   Step-ups - LAT 7\" 25 reps  TA   Step-ups - BWD  7\" 10 reps  TA   Step up and over reciprocally    TA   [x] TG  [] Leg Press 2-leg  TE   [] TG  [x] Leg Press 2-leg 40# 3 x 15  TE      TE   Marching gait   NR   Side stepping   NR               A: Tolerated well. Discussed anatomy, physiology, body mechanics, principles of loading, and progressive loading/activity. Reviewed home exercise program extensively along with instructions for ice and elevation; written copy provided. P: Continue with rehab plan. Doctor wants patient to continue PT; will shift focus to strengthening due to patient's concerns of instability. Next week possible discharge?   Nereida Finn PTA    Treatment Charges: Mins Units   Initial Evaluation     Re-Evaluation     Ther Exercise         TE 15 1   Manual Therapy     MT     Ther Activities        TA 15 1   Gait Training          GT     Neuro Re-education NR     Modalities     Non-Billable Service Time 10 0   Other     Total Time/Units 40 2

## 2023-01-24 ENCOUNTER — TREATMENT (OUTPATIENT)
Dept: PHYSICAL THERAPY | Age: 63
End: 2023-01-24
Payer: MEDICARE

## 2023-01-24 DIAGNOSIS — S83.206A TEAR OF MENISCUS OF RIGHT KNEE, UNSPECIFIED MENISCUS, UNSPECIFIED TEAR TYPE, UNSPECIFIED WHETHER OLD OR CURRENT TEAR: Primary | ICD-10-CM

## 2023-01-24 PROCEDURE — 97110 THERAPEUTIC EXERCISES: CPT

## 2023-01-24 PROCEDURE — 97530 THERAPEUTIC ACTIVITIES: CPT

## 2023-01-24 NOTE — PROGRESS NOTES
Physical Therapy Daily Treatment Note    Date: 2023  Patient Name: Ruby Felix  : 1960   MRN: [id number]  DOInjury: 2022  DOSx: 2022    Referring Provider:   Ruthy Fair DO   [address] Diagnosis:    Diagnosis Orders   1. Tear of meniscus of right knee, unspecified meniscus, unspecified tear type, unspecified whether old or current tear                TITLE OF OPERATION:   1. Right knee arthroscopic chondroplasty  2. Right knee arthroscopic synovectomy       Juan [facility] is 2 1/2 weeks post arthroscopic knee surgery (chondroplasty, synovectomy)  Patient has moderate limitations in both flexion and extension as well as mild edema along with impaired strength, function, gait, weightbearing tolerance. Treatment will start with methods to control swelling as well as AROM and stretching. Progression to strengthening, functional training, gait training, gait device advancement, and balance / proprioception exercises once motion and edema are under control. Outcome Measure: Lower Extremity Functional Scale (LEFS) 79% impairment      Access Code: [email or web address]  URL: [email or web address]  Date: 2022  Prepared by: Fina De Santiago    Program Notes  Proper Elevation    -- Elevate limb on a stable stack of blankets / pillows so leg is higher than heart. -- Do this for 45 minutes, 2-3 times a day  -- You may apply ice for the first 15 minutes, then remove it while you continue your elevation  -- Be sure to lie flat in bed or on a couch with a comfortable pillow under your head. Lying back in a recliner is not helpful.  -- You may prop your head up if you cannot tolerate lying completely flat, but only as high as necessary for comfort.         Exercises  Supine Heel Slide - 2 x daily - 3 sets - 10 reps  Supine Quad Set - 2 x daily - 3 sets - 10-15 reps - 5 sec hold  Supine Straight Leg Raises - 2 x daily - 3 sets - 10 reps      X = TO BE PERFORMED NEXT VISIT  > = PROGRESS TO THIS    S: Patient reports she mainly gets pain when she sleeps. O:    Time  9631-6028      Visit  13 Repeat outcome measure at mid point and end. Pain    5/10     ROM  110 flex, 0 ext 12/22/2022    Modalities          MO   Manual      Stretching knee flexion   MT   Stretching       Patella mobs 30 reps  TE   Prone hangs   TE   Heel props  TE   Knee flex stretch-seated  TE   Prone self flexion stretch   TE   Exercise       Nustep  L5 x 10 min  TE   Quad sets  TE   Heel slides  TE   SLR  TE   LAQ 5# 3 x 10, 5 sec hold  TE   Marching 3# 2 x 15 reps  TA   CR 3# 2 x 15 reps     Squat  3# 2 x 10 reps  TA   Step-ups - FWD  7\" 25 reps  TA   Step-ups - LAT 7\" 25 reps  TA   Step-ups - BWD  7\" 15 reps  TA   Step up and over reciprocally    TA   [x] TG  [] Leg Press 2-leg  TE   [] TG  [x] Leg Press 2-leg 60# 3 x 15  TE      TE   Marching gait   NR   Side stepping   NR               A: Tolerated well. Discussed anatomy, physiology, body mechanics, principles of loading, and progressive loading/activity. Reviewed home exercise program extensively along with instructions for ice and elevation; written copy provided.     P: Next session will be last.  Efrain Matos PTA    Treatment Charges: Mins Units   Initial Evaluation     Re-Evaluation     Ther Exercise         TE 15 1   Manual Therapy     MT     Ther Activities        TA 15 1   Gait Training          GT     Neuro Re-education NR     Modalities     Non-Billable Service Time 10 0   Other     Total Time/Units 40 2

## 2023-01-27 ENCOUNTER — TREATMENT (OUTPATIENT)
Dept: PHYSICAL THERAPY | Age: 63
End: 2023-01-27

## 2023-01-27 DIAGNOSIS — S83.206A TEAR OF MENISCUS OF RIGHT KNEE, UNSPECIFIED MENISCUS, UNSPECIFIED TEAR TYPE, UNSPECIFIED WHETHER OLD OR CURRENT TEAR: Primary | ICD-10-CM

## 2023-01-27 NOTE — PROGRESS NOTES
Physical Therapy Daily Treatment Note    Date: 2023  Patient Name: Shaheed Mathews  : 1960   MRN: [id number]  DOInjury: 2022  DOSx: 2022    Referring Provider:   Kamille Culver DO   [facility] [address] Diagnosis:    Diagnosis Orders   1. Tear of meniscus of right knee, unspecified meniscus, unspecified tear type, unspecified whether old or current tear            TITLE OF OPERATION:   1. Right knee arthroscopic chondroplasty  2. Right knee arthroscopic synovectomy       Demetrio Guy is 2 1/2 weeks post arthroscopic knee surgery (chondroplasty, synovectomy)  Patient has moderate limitations in both flexion and extension as well as mild edema along with impaired strength, function, gait, weightbearing tolerance. Treatment will start with methods to control swelling as well as AROM and stretching. Progression to strengthening, functional training, gait training, gait device advancement, and balance / proprioception exercises once motion and edema are under control. Outcome Measure: Lower Extremity Functional Scale (LEFS) 79% impairment      Access Code: [email or web address]  URL: [email or web address]  Date: 2022  Prepared by: Jaspal Ag    Program Notes  Proper Elevation    -- Elevate limb on a stable stack of blankets / pillows so leg is higher than heart. -- Do this for 45 minutes, 2-3 times a day  -- You may apply ice for the first 15 minutes, then remove it while you continue your elevation  -- Be sure to lie flat in bed or on a couch with a comfortable pillow under your head. Lying back in a recliner is not helpful.  -- You may prop your head up if you cannot tolerate lying completely flat, but only as high as necessary for comfort.         Exercises  Supine Heel Slide - 2 x daily - 3 sets - 10 reps  Supine Quad Set - 2 x daily - 3 sets - 10-15 reps - 5 sec hold  Supine Straight Leg Raises - 2 x daily - 3 sets - 10 reps      X = TO BE PERFORMED NEXT VISIT  > = PROGRESS TO THIS    S: Patient reports she mainly gets pain when she sleeps. O:    Time  7284-3474      Visit  14 Repeat outcome measure at mid point and end. Pain    5/10     ROM  110 flex, 0 ext 12/22/2022    Modalities          MO   Manual      Stretching knee flexion   MT   Stretching       Patella mobs 30 reps  TE   Prone hangs   TE   Heel props  TE   Knee flex stretch-seated  TE   Prone self flexion stretch   TE   Exercise       Nustep  L5 x 10 min  TE   Quad sets  TE   Heel slides  TE   SLR  TE   LAQ 5# 3 x 10, 5 sec hold  TE   Marching 3# 2 x 15 reps  TA   CR 3# 2 x 15 reps     Squat  3# 2 x 10 reps  TA   Step-ups - FWD  7\" 25 reps  TA   Step-ups - LAT 7\" 25 reps  TA   Step-ups - BWD  7\" 15 reps  TA   Step up and over reciprocally    TA   [x] TG  [] Leg Press 2-leg  TE   [] TG  [x] Leg Press 2-leg 60# 3 x 15  TE      TE   Marching gait   NR   Side stepping   NR               A: Tolerated well. Discussed anatomy, physiology, body mechanics, principles of loading, and progressive loading/activity. Reviewed home exercise program extensively along with instructions for ice and elevation; written copy provided. P: Today was patient's last session and they are to continue with HEP.   Anju Hutchinson PTA    Treatment Charges: Mins Units   Initial Evaluation     Re-Evaluation     Ther Exercise         TE 15 1   Manual Therapy     MT     Ther Activities        TA 15 1   Gait Training          GT     Neuro Re-education NR     Modalities     Non-Billable Service Time 10 0   Other     Total Time/Units 40 2

## 2023-02-22 ENCOUNTER — OFFICE VISIT (OUTPATIENT)
Dept: ORTHOPEDIC SURGERY | Age: 63
End: 2023-02-22

## 2023-02-22 VITALS — BODY MASS INDEX: 45.64 KG/M2 | TEMPERATURE: 98 F | WEIGHT: 284 LBS | HEIGHT: 66 IN

## 2023-02-22 DIAGNOSIS — S83.206A TEAR OF MENISCUS OF RIGHT KNEE, UNSPECIFIED MENISCUS, UNSPECIFIED TEAR TYPE, UNSPECIFIED WHETHER OLD OR CURRENT TEAR: Primary | ICD-10-CM

## 2023-02-22 DIAGNOSIS — M54.31 SCIATICA OF RIGHT SIDE: ICD-10-CM

## 2023-02-22 DIAGNOSIS — M22.2X1 PATELLOFEMORAL DISORDER OF RIGHT KNEE: ICD-10-CM

## 2023-02-22 NOTE — PROGRESS NOTES
Chief Complaint   Patient presents with    Knee Pain         HPI:    Patient is 60 y.o. female presents today for Right knee arthroscopy follow up. DOS 11/14/22. Patient has finished PT but started doing water aerobics for exercise. She states that she feels like she is improving overall but sometimes the knee is more painful then other days. Knee still stiff at times but usually this occurs when her sciatica is worse as well. ROS:    Skin: (-) rash,(-) psoriasis,(-) eczema, (-)skin cancer. Neurologic: (-)numbness, (-)tingling, (-)headaches, (-) LOC. Cardiovascular: (-) Chest pain, (-) swelling in legs/feet, (-) SOB, (-) cramping in legs/feet with walking.     All other review of systems negative except stated above or in HPI      Past Medical History:   Diagnosis Date    Arthritis     Asthma     COVID 10/15/2022    respiratory/ congestion    Diabetes mellitus (HCC)     GERD (gastroesophageal reflux disease)     History of blood transfusion     after daughter 28 yrs ago     Hyperlipidemia     Kidney stones     Neuropathy     Pleurisy     Pneumonia     Sleep apnea     no cpap    Urine frequency      Past Surgical History:   Procedure Laterality Date    ANESTHESIA NERVE BLOCK Left 05/15/2019    LEFT LUMBAR TRANSFORAMINAL NERVE BLOCK L3-4 L4-5 WITH IV SEDATION (CPT 09062) performed by Tyson Child DO at [facility] Left 07/24/2019    TRANSFORAMINAL LUMBAR LEFT AT L3-4 AND L4-5 WITH IV SEDATION performed by Tyson Child DO at [facility]  08/01/2012    L3,4,5    BREAST LUMPECTOMY Left 03/2022    mass removal   cancerous    BREAST SURGERY      right lumpectomy benign    CHOLECYSTECTOMY, LAPAROSCOPIC      COLONOSCOPY      DILATATION, ESOPHAGUS      ENDOSCOPY, COLON, DIAGNOSTIC      FINGER TRIGGER RELEASE Bilateral 10/02/2014    HYSTERECTOMY (CERVIX STATUS UNKNOWN)      KNEE ARTHROSCOPY Left 09/20/2016    medial menisectomy, synovectomy, chondroplasty    KNEE ARTHROSCOPY Right 11/14/2022    RIGHT KNEE ARTHROSCOPY OATS PROCEDURE performed by Shelley Quintana DO at [address] Left 02/19/2014    left lumbar transforaminal nerve block under x-ray with sedation L3-4, L4-5  #1    NERVE BLOCK Left 02/26/2014    tranfsoramihnla #2 w sedation    NERVE BLOCK  04/30/2014    internal lumbar facet block #1 with sedation L3-4, L4-5, L5-S1    NERVE BLOCK  05/07/2014    bilateral facet block L3-4, L4-5, L5-S1 with IV sedation #2    NERVE BLOCK Bilateral 05/14/2014    lumb facet with sedation #3    NERVE BLOCK Bilateral 02/18/2015    si inj #1 with anesthesia    NERVE BLOCK Bilateral 02/25/2015    si inj #2 with iv sedation    NERVE BLOCK Left 03/28/2018    lumbar transforaminal block #1 with sedation    NERVE BLOCK Left 04/18/2018    lumbar transforaminal nerve block under sedation #2    NERVE BLOCK Right 04/25/2018    lumbar transforaminal #1    NERVE BLOCK Right 05/02/2018    right lumbar transforaminal nerve block under xray #2 L3-5    NERVE BLOCK Left 05/15/2019    left lumbar NB with IV sedation     NERVE BLOCK Left 07/24/2019    left lumbar transforaminal block at L3-5 with IV sedation    NERVE BLOCK Right 10/12/2022    RIGHT PIRIFORMIS INJECTION UNDER XRAY  (CPT 73012) performed by Jack Sanford DO at [address] Left 03/05/2014    left lumbar transforaminal nerve block  under x-ray with sedation  #3 L3-4, L4-5    OTHER SURGICAL HISTORY Left 06/18/2014    left lumbar radiofrequency L3-4, L4-5, L5-S!    OTHER SURGICAL HISTORY Right 07/28/2014    L3-S1 radiofrequency    [place] RHINP DFRM W/COLUM LNGTH TIP ONLY Left 03/28/2018    LEFT LUMBAR TRANSFORMANIAL NERVE BLOCK UNDER XRAY #1 L3-4 L4-5 WITH IV SEDATION performed by Jack Sanford DO at [address] W[address] TIP ONLY Left 04/18/2018    LEFT LUMBAR TRANSFORMANIAL NERVE BLOCK UNDER XRAY #2 L3-4 L4-5 WITH IV SEDATION performed by Marisabel Morales DO at [address] [facility] [address] [facility] [address] W[address]COL[address] TIP ONLY Right 04/25/2018    RIGHT LUMBAR TRANSFORMANIAL NERVE BLOCK UNDER XRAY #1 L3-4 L4-5 WITH IV SEDATION performed by Marisabel Morales DO at [address] [facility] W/COLUM [address] TIP ONLY Right 05/02/2018    RIGHT LUMBAR TRANSFORMANIAL NERVE BLOCK UNDER XRAY #2 L3-4 L4-5 WITH IV SEDATION performed by Marisabel Morales DO at [facility]      right by Dr. Bina Muniz         Current Outpatient Medications:     aspirin 325 MG EC tablet, Take 1 tablet by mouth daily for 14 days, Disp: 14 tablet, Rfl: 0    docusate sodium (COLACE) 100 MG capsule, Take 1 capsule by mouth 2 times daily as needed for Constipation, Disp: 40 capsule, Rfl: 1    ondansetron (ZOFRAN) 4 MG tablet, Take 1 tablet by mouth every 6 hours as needed for Nausea or Vomiting, Disp: 20 tablet, Rfl: 1    Cholecalciferol (VITAMIN D) 50 MCG (2000 UT) CAPS capsule, Take by mouth three times a week, Disp: , Rfl:     mirabegron (MYRBETRIQ) 25 MG TB24, Take 25 mg by mouth daily, Disp: , Rfl:     D-Mannose 500 MG CAPS, Take by mouth 2 times daily, Disp: , Rfl:     zinc gluconate 50 MG tablet, Take 50 mg by mouth daily, Disp: , Rfl:     Probiotic Product (PROBIOTIC DAILY PO), Take 1 tablet by mouth daily, Disp: , Rfl:     LUTEIN PO, Take 1 tablet by mouth daily (25mg tab), Disp: , Rfl:     celecoxib (CELEBREX) 200 MG capsule, Take 200 mg by mouth 2 times daily, Disp: , Rfl:     SITagliptin (JANUVIA) 100 MG tablet, Take 100 mg by mouth daily, Disp: , Rfl:     metFORMIN (GLUCOPHAGE) 1000 MG tablet, Take 1,000 mg by mouth 2 times daily (with meals), Disp: , Rfl:     glipiZIDE (GLUCOTROL) 10 MG tablet, Take 10 mg by mouth in the morning and 10 mg in the evening. Take before meals. , Disp: , Rfl:     vitamin B-12 (CYANOCOBALAMIN) 1000 MCG tablet, Take 1,000 mcg by mouth daily, Disp: , Rfl:     Exenatide (BYDUREON) 2 MG PEN, Inject into the skin once a week, Disp: , Rfl:     cetirizine (ZYRTEC) 10 MG tablet, Take 10 mg by mouth daily, Disp: , Rfl:     vitamin C (ASCORBIC ACID) 500 MG tablet, Take 1,000 mg by mouth daily, Disp: , Rfl:     calcium carbonate 600 MG TABS tablet, Take 1 tablet by mouth daily, Disp: , Rfl:     Multiple Vitamins-Minerals (CENTRUM SILVER PO), Take by mouth daily, Disp: , Rfl:     metFORMIN (GLUCOPHAGE) 500 MG tablet, Take 500 mg by mouth daily (before lunch), Disp: , Rfl:     omeprazole (PRILOSEC) 20 MG delayed release capsule, Take 20 mg by mouth daily , Disp: , Rfl:     simvastatin (ZOCOR) 40 MG tablet, Take 40 mg by mouth nightly, Disp: , Rfl:     hydroxychloroquine (PLAQUENIL) 200 MG tablet, Take by mouth 2 times daily , Disp: , Rfl:     ezetimibe (ZETIA) 10 MG tablet, Take 10 mg by mouth nightly , Disp: , Rfl:     gemfibrozil (LOPID) 600 MG tablet, Take 600 mg by mouth 2 times daily (before meals), Disp: , Rfl:   Allergies   Allergen Reactions    Latex Rash     sensitive     Social History     Socioeconomic History    Marital status:      Spouse name: Not on file    Number of children: 2    Years of education: Not on file    Highest education level: Not on file   Occupational History    Occupation:      Comment: [employer]   Tobacco Use    Smoking status: Former     Years: 10.00     Types: Cigarettes     Quit date: 2015     Years since quittin.6    Smokeless tobacco: Never   Vaping Use    Vaping Use: Never used   Substance and Sexual Activity    Alcohol use: No    Drug use: No    Sexual activity: Yes     Partners: Male   Other Topics Concern    Not on file   Social History Narrative    Not on file     Social Determinants of Health     Financial Resource Strain: Not on file   Food Insecurity: Not on file   Transportation Needs: Not on file   Physical Activity: Not on file   Stress: Not on file   Social Connections: Not on file   Intimate Partner Violence: Not on file   Housing Stability: Not on file     Family History   Problem Relation Age of Onset    Diabetes Mother     Hypertension Mother     Heart Disease Mother         father, brother, materanl grandmother    Heart Failure Mother     Cancer Mother         skin cancer     Hypertension Father     Heart Disease Father     Heart Failure Father     Heart Disease Brother     No Known Problems Other     Cancer Sister         skin cancer uknown type     Cancer Brother         lung,colon,skin cancer            Physical Exam:    Temp 98 °F (36.7 °C)   Ht 5' 6\" (1.676 m)   Wt 284 lb (128.8 kg)   BMI 45.84 kg/m²     GENERAL: alert, appears stated age, cooperative, no acute distress    HEENT: Head is normocephalic, atraumatic. PERRLA. SKIN: Clean, dry, intact. There is not any cellulitis or cutaneous lesions noted in the lower extremities     PULMONARY: breathing is regular and unlabored, no acute distress     CV: The bilateral lower extremities are warm and well-perfused with brisk capillary refill. 2+ pulses LE bilateral.     ABDOMINAL: Nontender, nondistended     PSYCHIATRY: Pleasant mood, appropriate behavior, follows commands     NEURO: Sensation is intact distally with light touch with no alteration. Motor exam of the lower extremities show quadriceps, hamstrings, foot dorsiflexion and plantarflexion grossly intact 5/5. LYMPH: No lymphedema present distally in upper or lower extremity.      MUSCULOSKELETAL:  right Knee    Swelling: None   Effusion: Negative   Tenderness: None       Range of Motion:      Extension: Normal     Flexion: Normal       McMurrays: Negative   Anterior Lachmann: Negative   Posterior Lachmann: Negative   Anterior Drawer: Negative   Posterior Drawer: Negative   Varus Stress Test: Negative   Valgus Stress Test: Negative   Pivot Shift: Negative   Patellar Apprehension: No             Imaging:  Surgical pictures and imaging reviewed with patient in detail        Gregoria Marsh was seen today for knee pain.    Diagnoses and all orders for this visit:    Tear of meniscus of right knee, unspecified meniscus, unspecified tear type, unspecified whether old or current tear    Patellofemoral disorder of right knee    Sciatica of right side      Patient seen and examined. Imaging, MRI, surgical photos, surgical procedure and rehab reviewed with patient in detail. Patient's chart also reviewed in detail with additional time taken today. Natural history and course discussed with patient in long discussion. Treatment options discussed with patient in detail including risks and benefits. Patient should do well with continued conservative management as patient would like to avoid surgery at this time. In a 15 minute assessment and discussion, patient was counseled on weight loss, healthy diet, and physical activity relating to this condition. She was educated with options in detail including nutrition, joining a health club/ weight loss program, and use of cardio equipment such as the Arc Trainer and the importance of use as well as range of motion and HEP exercises for weight loss and general health. Catherene Riedel,              25 minutes was spent with patient. 50% or greater was spent counseling the patient.

## 2023-06-19 ENCOUNTER — HOSPITAL ENCOUNTER (EMERGENCY)
Age: 63
Discharge: HOME | End: 2023-06-19
Payer: MEDICARE

## 2023-06-19 ENCOUNTER — APPOINTMENT (OUTPATIENT)
Dept: GENERAL RADIOLOGY | Age: 63
End: 2023-06-19
Payer: MEDICARE

## 2023-06-19 VITALS
BODY MASS INDEX: 44.2 KG/M2 | HEIGHT: 66 IN | WEIGHT: 275 LBS | TEMPERATURE: 98.6 F | HEART RATE: 88 BPM | RESPIRATION RATE: 18 BRPM | DIASTOLIC BLOOD PRESSURE: 80 MMHG | SYSTOLIC BLOOD PRESSURE: 140 MMHG

## 2023-06-19 DIAGNOSIS — S92.355A NONDISPLACED FRACTURE OF FIFTH METATARSAL BONE, LEFT FOOT, INITIAL ENCOUNTER FOR CLOSED FRACTURE: ICD-10-CM

## 2023-06-19 DIAGNOSIS — S92.345A NONDISPLACED FRACTURE OF FOURTH METATARSAL BONE, LEFT FOOT, INITIAL ENCOUNTER FOR CLOSED FRACTURE: Primary | ICD-10-CM

## 2023-06-19 PROCEDURE — 73630 X-RAY EXAM OF FOOT: CPT

## 2023-06-19 PROCEDURE — 29515 APPLICATION SHORT LEG SPLINT: CPT

## 2023-06-19 PROCEDURE — 99212 OFFICE O/P EST SF 10 MIN: CPT

## 2023-06-19 RX ORDER — GLYCOPYRROLATE 1 MG/1
1 TABLET ORAL 3 TIMES DAILY
COMMUNITY

## 2023-06-19 ASSESSMENT — PAIN DESCRIPTION - LOCATION: LOCATION: ANKLE

## 2023-06-19 ASSESSMENT — PAIN - FUNCTIONAL ASSESSMENT: PAIN_FUNCTIONAL_ASSESSMENT: 0-10

## 2023-06-19 ASSESSMENT — PAIN DESCRIPTION - ORIENTATION: ORIENTATION: LEFT

## 2023-06-19 ASSESSMENT — PAIN DESCRIPTION - DESCRIPTORS: DESCRIPTORS: ACHING;DISCOMFORT

## 2023-06-19 ASSESSMENT — PAIN SCALES - GENERAL: PAINLEVEL_OUTOF10: 6

## 2023-06-19 NOTE — ED PROVIDER NOTES
[facility]  EMERGENCY DEPARTMENT ENCOUNTER        NAME: Zahraa Trujillo  :  1960  MRN:  [id number]  Date of evaluation: 2023  Provider: Iraida Pitts PA-C  PCP: Alanis Muñiz DO  Note Started : 4:12 PM EDT 23    Chief Complaint: Ankle Injury (Left ankle rolled )      This is a 42-year-old female that presents to urgent care complaining of left foot pain with some radiation of pain base of the left ankle. She denies any other limb injury. She denies head or neck or back pain. She states she had fallen today. On first contact of patient she appears to be in no acute distress. Review of Systems  Pertinent positives and negatives are stated within HPI, all other systems reviewed and are negative. Allergies: Latex     --------------------------------------------- PAST HISTORY ---------------------------------------------  Past Medical History:  has a past medical history of Arthritis, Asthma, COVID, Diabetes mellitus ([address] GERD (gastroesophageal reflux disease), History of blood transfusion, Hyperlipidemia, Kidney stones, Neuropathy, Pleurisy, Pneumonia, Sleep apnea, and Urine frequency. Past Surgical History:  has a past surgical history that includes back surgery (2012); Tonsillectomy; Appendectomy; Hysterectomy; Breast surgery; Cholecystectomy, laparoscopic; Tubal ligation; Stapedes surgery; Nerve Block (Left, 2014); Nerve Block (Left, 2014); other surgical history (Left, 2014); Nerve Block (2014); Nerve Block (2014); Nerve Block (Bilateral, 2014); other surgical history (Left, 2014); other surgical history (Right, 2014); Finger trigger release (Bilateral, 10/02/2014); Nerve Block (Bilateral, 2015); Nerve Block (Bilateral, 2015); Dilatation, esophagus; Knee arthroscopy (Left, 2016); Nerve Block (Left, 2018); pr rhinp dfrm w/colum lngth tip only (Left, 2018);  Nerve

## 2023-06-20 ENCOUNTER — TELEPHONE (OUTPATIENT)
Dept: ORTHOPEDIC SURGERY | Age: 63
End: 2023-06-20

## 2023-06-20 ENCOUNTER — OFFICE VISIT (OUTPATIENT)
Dept: PODIATRY | Age: 63
End: 2023-06-20

## 2023-06-20 VITALS — HEIGHT: 66 IN | BODY MASS INDEX: 44.2 KG/M2 | WEIGHT: 275 LBS

## 2023-06-20 DIAGNOSIS — R60.0 LOCALIZED EDEMA: ICD-10-CM

## 2023-06-20 DIAGNOSIS — S92.345A CLOSED NONDISPLACED FRACTURE OF FOURTH METATARSAL BONE OF LEFT FOOT, INITIAL ENCOUNTER: Primary | ICD-10-CM

## 2023-06-20 DIAGNOSIS — S92.355A CLOSED NONDISPLACED FRACTURE OF FIFTH METATARSAL BONE OF LEFT FOOT, INITIAL ENCOUNTER: ICD-10-CM

## 2023-06-20 DIAGNOSIS — I87.2 VENOUS INSUFFICIENCY (CHRONIC) (PERIPHERAL): ICD-10-CM

## 2023-06-20 DIAGNOSIS — E11.51 TYPE II DIABETES MELLITUS WITH PERIPHERAL CIRCULATORY DISORDER (HCC): ICD-10-CM

## 2023-06-20 DIAGNOSIS — R26.2 DIFFICULTY WALKING: ICD-10-CM

## 2023-06-20 NOTE — TELEPHONE ENCOUNTER
Pt needs a ER follow up [facility], Nondisplaced fracture of fourth metatarsal bone, left foot, initial encounter for closed fracture +1 more. Pt stated the partial cast they put on has caused a blister and also it feels like it is coming off. Please contact pt to schedule for fracture.

## 2023-06-20 NOTE — PROGRESS NOTES
Patient is in today for evaluation of left foot pain. Patient stood up and fell on her left foot after it had fallen asleep. Patient did go to urgent care yesterday where the foot was fractured.  Pcp is Cameron Junior DO  Last ov 6/12/23

## 2023-06-20 NOTE — PROGRESS NOTES
23     Ying Saldana    : 1960 Sex: female   Age: 58 y.o. Patient was referred by: None  Patient's PCP/Provider is:  Naresh Fuentes DO    Subjective:    Patient seen today regarding left foot injury which she sustained yesterday at home. Chief Complaint   Patient presents with    Foot Injury     Left foot        HPI: Patient was getting up out of a chair and her left lower leg was numb, she fell awkwardly onto her left lower extremity. Patient noticed increased swelling and discomfort and did present through the emergency. Patient had radiographs taken left foot, was placed in a posterior splint for immobilization. She was given ice and advised on taking anti-inflammatories. Patient presented today with crutches and wheelchair assistance. Patient and her  did want to discuss treatment options available at this time. ROS:  Const: Positives and pertinent negatives as per HPI. Musculo: Denies symptoms other than stated above. Neuro: Denies symptoms other than stated above. Skin: Denies symptoms other than stated above.     Current Medications:    Current Outpatient Medications:     glycopyrrolate (ROBINUL) 1 MG tablet, Take 1 tablet by mouth 3 times daily, Disp: , Rfl:     docusate sodium (COLACE) 100 MG capsule, Take 1 capsule by mouth 2 times daily as needed for Constipation, Disp: 40 capsule, Rfl: 1    ondansetron (ZOFRAN) 4 MG tablet, Take 1 tablet by mouth every 6 hours as needed for Nausea or Vomiting, Disp: 20 tablet, Rfl: 1    Cholecalciferol (VITAMIN D) 50 MCG (2000 UT) CAPS capsule, Take by mouth three times a week, Disp: , Rfl:     mirabegron (MYRBETRIQ) 25 MG TB24, Take 1 tablet by mouth daily, Disp: , Rfl:     D-Mannose 500 MG CAPS, Take by mouth 2 times daily, Disp: , Rfl:     zinc gluconate 50 MG tablet, Take 1 tablet by mouth daily, Disp: , Rfl:     Probiotic Product (PROBIOTIC DAILY PO), Take 1 tablet by mouth daily, Disp: , Rfl:     LUTEIN PO, Take 1

## 2023-06-28 ENCOUNTER — OFFICE VISIT (OUTPATIENT)
Dept: PODIATRY | Age: 63
End: 2023-06-28
Payer: MEDICARE

## 2023-06-28 VITALS — HEIGHT: 66 IN | WEIGHT: 275 LBS | BODY MASS INDEX: 44.2 KG/M2

## 2023-06-28 DIAGNOSIS — S92.355A CLOSED NONDISPLACED FRACTURE OF FIFTH METATARSAL BONE OF LEFT FOOT, INITIAL ENCOUNTER: ICD-10-CM

## 2023-06-28 DIAGNOSIS — R26.2 DIFFICULTY WALKING: ICD-10-CM

## 2023-06-28 DIAGNOSIS — E11.51 TYPE II DIABETES MELLITUS WITH PERIPHERAL CIRCULATORY DISORDER (HCC): ICD-10-CM

## 2023-06-28 DIAGNOSIS — I87.2 VENOUS INSUFFICIENCY (CHRONIC) (PERIPHERAL): ICD-10-CM

## 2023-06-28 DIAGNOSIS — S92.345A CLOSED NONDISPLACED FRACTURE OF FOURTH METATARSAL BONE OF LEFT FOOT, INITIAL ENCOUNTER: Primary | ICD-10-CM

## 2023-06-28 PROCEDURE — 99999 PR OFFICE/OUTPT VISIT,PROCEDURE ONLY: CPT | Performed by: PODIATRIST

## 2023-06-28 PROCEDURE — 29580 STRAPPING UNNA BOOT: CPT | Performed by: PODIATRIST

## 2023-06-29 DIAGNOSIS — S92.355A CLOSED NONDISPLACED FRACTURE OF FIFTH METATARSAL BONE OF LEFT FOOT, INITIAL ENCOUNTER: ICD-10-CM

## 2023-06-29 DIAGNOSIS — R26.2 DIFFICULTY WALKING: ICD-10-CM

## 2023-06-29 DIAGNOSIS — S92.345A CLOSED NONDISPLACED FRACTURE OF FOURTH METATARSAL BONE OF LEFT FOOT, INITIAL ENCOUNTER: Primary | ICD-10-CM

## 2023-06-29 DIAGNOSIS — E11.51 TYPE II DIABETES MELLITUS WITH PERIPHERAL CIRCULATORY DISORDER (HCC): ICD-10-CM

## 2023-07-12 ENCOUNTER — OFFICE VISIT (OUTPATIENT)
Dept: PODIATRY | Age: 63
End: 2023-07-12
Payer: MEDICARE

## 2023-07-12 VITALS — WEIGHT: 275 LBS | BODY MASS INDEX: 44.2 KG/M2 | HEIGHT: 66 IN

## 2023-07-12 DIAGNOSIS — R26.2 DIFFICULTY WALKING: ICD-10-CM

## 2023-07-12 DIAGNOSIS — S92.345A CLOSED NONDISPLACED FRACTURE OF FOURTH METATARSAL BONE OF LEFT FOOT, INITIAL ENCOUNTER: Primary | ICD-10-CM

## 2023-07-12 DIAGNOSIS — E11.51 TYPE II DIABETES MELLITUS WITH PERIPHERAL CIRCULATORY DISORDER (HCC): ICD-10-CM

## 2023-07-12 DIAGNOSIS — S92.355A CLOSED NONDISPLACED FRACTURE OF FIFTH METATARSAL BONE OF LEFT FOOT, INITIAL ENCOUNTER: ICD-10-CM

## 2023-07-12 PROCEDURE — 3017F COLORECTAL CA SCREEN DOC REV: CPT | Performed by: PODIATRIST

## 2023-07-12 PROCEDURE — 1036F TOBACCO NON-USER: CPT | Performed by: PODIATRIST

## 2023-07-12 PROCEDURE — 99213 OFFICE O/P EST LOW 20 MIN: CPT | Performed by: PODIATRIST

## 2023-07-12 PROCEDURE — 2022F DILAT RTA XM EVC RTNOPTHY: CPT | Performed by: PODIATRIST

## 2023-07-12 PROCEDURE — 3044F HG A1C LEVEL LT 7.0%: CPT | Performed by: PODIATRIST

## 2023-07-12 PROCEDURE — G8427 DOCREV CUR MEDS BY ELIG CLIN: HCPCS | Performed by: PODIATRIST

## 2023-07-12 PROCEDURE — G8417 CALC BMI ABV UP PARAM F/U: HCPCS | Performed by: PODIATRIST

## 2023-07-12 NOTE — PROGRESS NOTES
Patient is in today for 2 week left foot fracture follow up. Patient says the foot is doing well, but she is having pain in the ankle.  Pcp is Belem Nance,   Last ov 6/12/23
for exam:->pain FINDINGS: There are comminuted but largely oblique fractures involving the left 4th and 5th metatarsal diaphyses mid to distal portions. There is slight medial angulation of the fracture apices. The fractures do not extend into the MTP joints. No definite dislocation. Osseous mineralization is normal.  There is joint space narrowing and marginal osteophyte formation at most of the DIP and PIP joints, at the 1st MTP joint and along the dorsal aspect of the intertarsal joints. There is a small ossific density along the dorsal aspect of the distal talus. There are calcaneal spurs at the insertion of the plantar aponeurosis and Achilles tendon. There is soft tissue swelling over the dorsum of the left forefoot. Acute fractures left 4th and 5th metatarsal diaphyses. Probable chronic degenerative or remote posttraumatic change along the dorsal aspect of the distal talus although a small acute or subacute avulsion fracture at that site cannot be entirely excluded. Procedures:    None    Plan Per Assessment  Jailyn Franklin was seen today for foot injury. Diagnoses and all orders for this visit:    Closed nondisplaced fracture of fourth metatarsal bone of left foot, initial encounter    Closed nondisplaced fracture of fifth metatarsal bone of left foot, initial encounter    Type II diabetes mellitus with peripheral circulatory disorder (HCC)    Difficulty walking      Evaluation and management  Radiographs were ordered and reviewed 3 views left foot. Stable alignment maintained left third and fourth metatarsals with interval healing noted. Patient is to continue wearing the offloading boot and utilizing crutches as instructed to allow for continued fracture site healing. We did discuss additional diabetic foot care techniques with patient in detail today. Patient will be followed up at a later date for continued evaluation and management.       Seen By:    Amanda Slade

## 2023-07-26 ENCOUNTER — OFFICE VISIT (OUTPATIENT)
Dept: PODIATRY | Age: 63
End: 2023-07-26
Payer: MEDICARE

## 2023-07-26 VITALS — HEIGHT: 66 IN | BODY MASS INDEX: 44.2 KG/M2 | WEIGHT: 275 LBS

## 2023-07-26 DIAGNOSIS — S92.355A CLOSED NONDISPLACED FRACTURE OF FIFTH METATARSAL BONE OF LEFT FOOT, INITIAL ENCOUNTER: ICD-10-CM

## 2023-07-26 DIAGNOSIS — S92.345A CLOSED NONDISPLACED FRACTURE OF FOURTH METATARSAL BONE OF LEFT FOOT, INITIAL ENCOUNTER: Primary | ICD-10-CM

## 2023-07-26 DIAGNOSIS — E11.51 TYPE II DIABETES MELLITUS WITH PERIPHERAL CIRCULATORY DISORDER (HCC): ICD-10-CM

## 2023-07-26 PROCEDURE — 3017F COLORECTAL CA SCREEN DOC REV: CPT | Performed by: PODIATRIST

## 2023-07-26 PROCEDURE — 99213 OFFICE O/P EST LOW 20 MIN: CPT | Performed by: PODIATRIST

## 2023-07-26 PROCEDURE — 1036F TOBACCO NON-USER: CPT | Performed by: PODIATRIST

## 2023-07-26 PROCEDURE — G8427 DOCREV CUR MEDS BY ELIG CLIN: HCPCS | Performed by: PODIATRIST

## 2023-07-26 PROCEDURE — 3044F HG A1C LEVEL LT 7.0%: CPT | Performed by: PODIATRIST

## 2023-07-26 PROCEDURE — G8417 CALC BMI ABV UP PARAM F/U: HCPCS | Performed by: PODIATRIST

## 2023-07-26 PROCEDURE — 2022F DILAT RTA XM EVC RTNOPTHY: CPT | Performed by: PODIATRIST

## 2023-07-26 NOTE — PROGRESS NOTES
Patient is in today for 2 week left foot fracture follow up. Patient says she is doing better, but having more pain while wearing the boot.  Pcp is Segundo Almeida,   Last ov 6/12/23
left foot. Patient will be followed up at a later date for continued evaluation and management. She was advised to call the office with any questions or concerns in the interim. Seen By:    Ramon Nuno DPM    Electronically signed by Ramon Nuno DPM on 7/26/2023 at 10:41 AM    This note was created using voice recognition software. The note was reviewed however may contain grammatical errors.

## 2023-08-10 DIAGNOSIS — S92.355A CLOSED NONDISPLACED FRACTURE OF FIFTH METATARSAL BONE OF LEFT FOOT, INITIAL ENCOUNTER: ICD-10-CM

## 2023-08-10 DIAGNOSIS — S92.345A CLOSED NONDISPLACED FRACTURE OF FOURTH METATARSAL BONE OF LEFT FOOT, INITIAL ENCOUNTER: Primary | ICD-10-CM

## 2023-08-16 ENCOUNTER — OFFICE VISIT (OUTPATIENT)
Dept: PODIATRY | Age: 63
End: 2023-08-16
Payer: MEDICARE

## 2023-08-16 VITALS — HEIGHT: 66 IN | WEIGHT: 275 LBS | BODY MASS INDEX: 44.2 KG/M2

## 2023-08-16 DIAGNOSIS — S92.355A CLOSED NONDISPLACED FRACTURE OF FIFTH METATARSAL BONE OF LEFT FOOT, INITIAL ENCOUNTER: ICD-10-CM

## 2023-08-16 DIAGNOSIS — S92.345A CLOSED NONDISPLACED FRACTURE OF FOURTH METATARSAL BONE OF LEFT FOOT, INITIAL ENCOUNTER: Primary | ICD-10-CM

## 2023-08-16 DIAGNOSIS — M79.672 PAIN IN LEFT FOOT: ICD-10-CM

## 2023-08-16 DIAGNOSIS — R26.2 DIFFICULTY WALKING: ICD-10-CM

## 2023-08-16 DIAGNOSIS — E11.51 TYPE II DIABETES MELLITUS WITH PERIPHERAL CIRCULATORY DISORDER (HCC): ICD-10-CM

## 2023-08-16 PROCEDURE — 99213 OFFICE O/P EST LOW 20 MIN: CPT | Performed by: PODIATRIST

## 2023-08-16 PROCEDURE — G8417 CALC BMI ABV UP PARAM F/U: HCPCS | Performed by: PODIATRIST

## 2023-08-16 PROCEDURE — 1036F TOBACCO NON-USER: CPT | Performed by: PODIATRIST

## 2023-08-16 PROCEDURE — 3044F HG A1C LEVEL LT 7.0%: CPT | Performed by: PODIATRIST

## 2023-08-16 PROCEDURE — G8427 DOCREV CUR MEDS BY ELIG CLIN: HCPCS | Performed by: PODIATRIST

## 2023-08-16 PROCEDURE — 2022F DILAT RTA XM EVC RTNOPTHY: CPT | Performed by: PODIATRIST

## 2023-08-16 PROCEDURE — 3017F COLORECTAL CA SCREEN DOC REV: CPT | Performed by: PODIATRIST

## 2023-08-16 NOTE — PROGRESS NOTES
Patient is in today for 3 week left foot fracture follow up. Patient says she is having pain and swelling in the foot but is wearing her normal shoes.  Pcp is Justin Zuniga DO  Last ov 6/12/23

## 2023-08-17 NOTE — PROGRESS NOTES
23     Neyda Saldana    : 1960   Sex: female    Age: 58 y.o. Patient's PCP/Provider is:  Brayan Adams DO    Subjective:  Patient is seen today for follow-up regarding continued care regarding fracture left fourth and fifth metatarsal regions. Patient has been back into her regular shoe gear as tolerated. Patient is still having some symptoms with every day ambulatory activities due to some residual swelling and tightness into the lateral hindfoot and ankle region. Patient denies any additional issues at this time. Chief Complaint   Patient presents with    Foot Injury     Left foot        ROS:  Const: Positives and pertinent negatives as per HPI. Musculo: Denies symptoms other than stated above. Neuro: Denies symptoms other than stated above. Skin: Denies symptoms other than stated above.     Current Medications:    Current Outpatient Medications:     glycopyrrolate (ROBINUL) 1 MG tablet, Take 1 tablet by mouth 3 times daily, Disp: , Rfl:     docusate sodium (COLACE) 100 MG capsule, Take 1 capsule by mouth 2 times daily as needed for Constipation, Disp: 40 capsule, Rfl: 1    ondansetron (ZOFRAN) 4 MG tablet, Take 1 tablet by mouth every 6 hours as needed for Nausea or Vomiting, Disp: 20 tablet, Rfl: 1    Cholecalciferol (VITAMIN D) 50 MCG (2000 UT) CAPS capsule, Take by mouth three times a week, Disp: , Rfl:     mirabegron (MYRBETRIQ) 25 MG TB24, Take 1 tablet by mouth daily, Disp: , Rfl:     D-Mannose 500 MG CAPS, Take by mouth 2 times daily, Disp: , Rfl:     zinc gluconate 50 MG tablet, Take 1 tablet by mouth daily, Disp: , Rfl:     Probiotic Product (PROBIOTIC DAILY PO), Take 1 tablet by mouth daily, Disp: , Rfl:     LUTEIN PO, Take 1 tablet by mouth daily (25mg tab), Disp: , Rfl:     celecoxib (CELEBREX) 200 MG capsule, Take 1 capsule by mouth 2 times daily, Disp: , Rfl:     SITagliptin (JANUVIA) 100 MG tablet, Take 1 tablet by mouth daily, Disp: , Rfl:     metFORMIN

## 2023-08-22 ENCOUNTER — TELEPHONE (OUTPATIENT)
Dept: PHYSICAL THERAPY | Age: 63
End: 2023-08-22

## 2023-08-22 PROBLEM — S92.355A CLOSED NONDISPLACED FRACTURE OF FIFTH LEFT METATARSAL BONE: Status: ACTIVE | Noted: 2023-08-22

## 2023-08-22 PROBLEM — E11.51 TYPE II DIABETES MELLITUS WITH PERIPHERAL CIRCULATORY DISORDER (HCC): Status: ACTIVE | Noted: 2023-08-22

## 2023-08-22 PROBLEM — R26.2 DIFFICULTY WALKING: Status: ACTIVE | Noted: 2023-08-22

## 2023-08-22 PROBLEM — M79.672 PAIN IN LEFT FOOT: Status: ACTIVE | Noted: 2023-08-22

## 2023-08-22 PROBLEM — S92.345A CLOSED NONDISPLACED FRACTURE OF FOURTH METATARSAL BONE OF LEFT FOOT: Status: ACTIVE | Noted: 2023-08-22

## 2023-08-22 NOTE — TELEPHONE ENCOUNTER
Date: 2023       Patient Name: Jennifer Charles  : 1960      MRN: [id number]    No show/no call for PT evaluation scheduled at 1015 today.     Amaya Field, PT

## 2023-08-31 ENCOUNTER — TELEPHONE (OUTPATIENT)
Dept: PHYSICAL THERAPY | Age: 63
End: 2023-08-31

## 2023-08-31 NOTE — TELEPHONE ENCOUNTER
Pt forgot her appt was today. has had a lot of family issues. Will see the dr next week if he wants her to have therapy she will call us back.

## 2023-09-06 ENCOUNTER — OFFICE VISIT (OUTPATIENT)
Dept: PODIATRY | Age: 63
End: 2023-09-06
Payer: MEDICARE

## 2023-09-06 VITALS — HEIGHT: 66 IN | WEIGHT: 275 LBS | BODY MASS INDEX: 44.2 KG/M2

## 2023-09-06 DIAGNOSIS — S92.355A CLOSED NONDISPLACED FRACTURE OF FIFTH METATARSAL BONE OF LEFT FOOT, INITIAL ENCOUNTER: ICD-10-CM

## 2023-09-06 DIAGNOSIS — S92.345A CLOSED NONDISPLACED FRACTURE OF FOURTH METATARSAL BONE OF LEFT FOOT, INITIAL ENCOUNTER: Primary | ICD-10-CM

## 2023-09-06 DIAGNOSIS — E11.51 TYPE II DIABETES MELLITUS WITH PERIPHERAL CIRCULATORY DISORDER (HCC): ICD-10-CM

## 2023-09-06 PROCEDURE — 2022F DILAT RTA XM EVC RTNOPTHY: CPT | Performed by: PODIATRIST

## 2023-09-06 PROCEDURE — 3044F HG A1C LEVEL LT 7.0%: CPT | Performed by: PODIATRIST

## 2023-09-06 PROCEDURE — G8427 DOCREV CUR MEDS BY ELIG CLIN: HCPCS | Performed by: PODIATRIST

## 2023-09-06 PROCEDURE — 3017F COLORECTAL CA SCREEN DOC REV: CPT | Performed by: PODIATRIST

## 2023-09-06 PROCEDURE — G8417 CALC BMI ABV UP PARAM F/U: HCPCS | Performed by: PODIATRIST

## 2023-09-06 PROCEDURE — 99213 OFFICE O/P EST LOW 20 MIN: CPT | Performed by: PODIATRIST

## 2023-09-06 PROCEDURE — 1036F TOBACCO NON-USER: CPT | Performed by: PODIATRIST

## 2023-09-06 NOTE — PROGRESS NOTES
23     Ana M Saldana    : 1960   Sex: female    Age: 58 y.o. Patient's PCP/Provider is:  Kelly Hayden DO    Subjective:  Patient is seen today for follow-up regarding continued care regarding her fracture sites left foot. Patient was unable to go to scheduled physical therapy due to family and home issues. Patient stated she has noticed improvement over the last several weeks with home exercises and increased activities she has been performing. Patient is wearing her good supportive shoe gear with every day activities as instructed. No additional abnormalities noted. Chief Complaint   Patient presents with    Foot Injury     Left foot        ROS:  Const: Positives and pertinent negatives as per HPI. Musculo: Denies symptoms other than stated above. Neuro: Denies symptoms other than stated above. Skin: Denies symptoms other than stated above.     Current Medications:    Current Outpatient Medications:     glycopyrrolate (ROBINUL) 1 MG tablet, Take 1 tablet by mouth 3 times daily, Disp: , Rfl:     docusate sodium (COLACE) 100 MG capsule, Take 1 capsule by mouth 2 times daily as needed for Constipation, Disp: 40 capsule, Rfl: 1    ondansetron (ZOFRAN) 4 MG tablet, Take 1 tablet by mouth every 6 hours as needed for Nausea or Vomiting, Disp: 20 tablet, Rfl: 1    Cholecalciferol (VITAMIN D) 50 MCG (2000 UT) CAPS capsule, Take by mouth three times a week, Disp: , Rfl:     mirabegron (MYRBETRIQ) 25 MG TB24, Take 1 tablet by mouth daily, Disp: , Rfl:     D-Mannose 500 MG CAPS, Take by mouth 2 times daily, Disp: , Rfl:     zinc gluconate 50 MG tablet, Take 1 tablet by mouth daily, Disp: , Rfl:     Probiotic Product (PROBIOTIC DAILY PO), Take 1 tablet by mouth daily, Disp: , Rfl:     LUTEIN PO, Take 1 tablet by mouth daily (25mg tab), Disp: , Rfl:     celecoxib (CELEBREX) 200 MG capsule, Take 1 capsule by mouth 2 times daily, Disp: , Rfl:     SITagliptin (JANUVIA) 100 MG tablet, Take 1

## 2023-09-06 NOTE — PROGRESS NOTES
Patient is in today for 3 week left foot fracture. Patient says she has not been able to get to PT yet due to issues at home. Patient says her foot is doing better, but still some discomfort.  Pcp is Angel Ramírez,   Last ov 6/12/23

## 2023-10-29 LAB
MICROORGANISM SPEC CULT: ABNORMAL
MICROORGANISM SPEC CULT: ABNORMAL
SERVICE CMNT-IMP: ABNORMAL
SPECIMEN DESCRIPTION: ABNORMAL

## 2023-11-19 LAB
MICROORGANISM SPEC CULT: ABNORMAL
SPECIMEN DESCRIPTION: ABNORMAL

## 2023-11-20 LAB
MICROORGANISM SPEC CULT: ABNORMAL
SPECIMEN DESCRIPTION: ABNORMAL

## 2023-12-15 LAB
ALBUMIN SERPL-MCNC: 4.2 G/DL (ref 3.5–5.2)
ALP SERPL-CCNC: 132 U/L (ref 35–104)
ALT SERPL-CCNC: 20 U/L (ref 0–32)
ANION GAP SERPL CALCULATED.3IONS-SCNC: 15 MMOL/L (ref 7–16)
AST SERPL-CCNC: 23 U/L (ref 0–31)
BILIRUB SERPL-MCNC: 0.2 MG/DL (ref 0–1.2)
BUN SERPL-MCNC: 15 MG/DL (ref 6–23)
CALCIUM SERPL-MCNC: 9.5 MG/DL (ref 8.6–10.2)
CHLORIDE SERPL-SCNC: 103 MMOL/L (ref 98–107)
CHOLEST SERPL-MCNC: 94 MG/DL
CO2 SERPL-SCNC: 20 MMOL/L (ref 22–29)
CREAT SERPL-MCNC: 0.7 MG/DL (ref 0.5–1)
ERYTHROCYTE [DISTWIDTH] IN BLOOD BY AUTOMATED COUNT: 14.4 % (ref 11.5–15)
GFR SERPL CREATININE-BSD FRML MDRD: >60 ML/MIN/1.73M2
GLUCOSE SERPL-MCNC: 141 MG/DL (ref 74–99)
HBA1C MFR BLD: 6 % (ref 4–5.6)
HCT VFR BLD AUTO: 40.2 % (ref 34–48)
HDLC SERPL-MCNC: 31 MG/DL
HGB BLD-MCNC: 12.2 G/DL (ref 11.5–15.5)
LDLC SERPL CALC-MCNC: 33 MG/DL
MCH RBC QN AUTO: 24.6 PG (ref 26–35)
MCHC RBC AUTO-ENTMCNC: 30.3 G/DL (ref 32–34.5)
MCV RBC AUTO: 81 FL (ref 80–99.9)
PLATELET # BLD AUTO: 259 K/UL (ref 130–450)
PMV BLD AUTO: 10.6 FL (ref 7–12)
POTASSIUM SERPL-SCNC: 4.6 MMOL/L (ref 3.5–5)
PROT SERPL-MCNC: 7.2 G/DL (ref 6.4–8.3)
RBC # BLD AUTO: 4.96 M/UL (ref 3.5–5.5)
SODIUM SERPL-SCNC: 138 MMOL/L (ref 132–146)
TRIGL SERPL-MCNC: 149 MG/DL
TSH SERPL DL<=0.05 MIU/L-ACNC: 1.26 UIU/ML (ref 0.27–4.2)
VLDLC SERPL CALC-MCNC: 30 MG/DL
WBC OTHER # BLD: 6.1 K/UL (ref 4.5–11.5)

## 2024-01-19 LAB
ALBUMIN SERPL-MCNC: 4.3 G/DL (ref 3.5–5.2)
ALP SERPL-CCNC: 131 U/L (ref 35–104)
ALT SERPL-CCNC: 24 U/L (ref 0–32)
ANION GAP SERPL CALCULATED.3IONS-SCNC: 16 MMOL/L (ref 7–16)
AST SERPL-CCNC: 23 U/L (ref 0–31)
BILIRUB SERPL-MCNC: 0.3 MG/DL (ref 0–1.2)
BUN SERPL-MCNC: 17 MG/DL (ref 6–23)
CALCIUM SERPL-MCNC: 9.6 MG/DL (ref 8.6–10.2)
CHLORIDE SERPL-SCNC: 104 MMOL/L (ref 98–107)
CO2 SERPL-SCNC: 22 MMOL/L (ref 22–29)
CREAT SERPL-MCNC: 0.8 MG/DL (ref 0.5–1)
ERYTHROCYTE [DISTWIDTH] IN BLOOD BY AUTOMATED COUNT: 14.2 % (ref 11.5–15)
GFR SERPL CREATININE-BSD FRML MDRD: >60 ML/MIN/1.73M2
GLUCOSE SERPL-MCNC: 92 MG/DL (ref 74–99)
HBA1C MFR BLD: 5.6 % (ref 4–5.6)
HCT VFR BLD AUTO: 40 % (ref 34–48)
HGB BLD-MCNC: 11.9 G/DL (ref 11.5–15.5)
MCH RBC QN AUTO: 24 PG (ref 26–35)
MCHC RBC AUTO-ENTMCNC: 29.8 G/DL (ref 32–34.5)
MCV RBC AUTO: 80.8 FL (ref 80–99.9)
PLATELET # BLD AUTO: 242 K/UL (ref 130–450)
PMV BLD AUTO: 10.3 FL (ref 7–12)
POTASSIUM SERPL-SCNC: 4.8 MMOL/L (ref 3.5–5)
PROT SERPL-MCNC: 7.1 G/DL (ref 6.4–8.3)
RBC # BLD AUTO: 4.95 M/UL (ref 3.5–5.5)
SODIUM SERPL-SCNC: 142 MMOL/L (ref 132–146)
WBC OTHER # BLD: 6.5 K/UL (ref 4.5–11.5)

## 2024-01-23 LAB
ALBUMIN SERPL-MCNC: 4 G/DL (ref 3.5–5.2)
ALP SERPL-CCNC: 124 U/L (ref 35–104)
ALT SERPL-CCNC: 23 U/L (ref 0–32)
ANION GAP SERPL CALCULATED.3IONS-SCNC: 16 MMOL/L (ref 7–16)
AST SERPL-CCNC: 24 U/L (ref 0–31)
BILIRUB SERPL-MCNC: 0.2 MG/DL (ref 0–1.2)
BUN SERPL-MCNC: 16 MG/DL (ref 6–23)
CALCIUM SERPL-MCNC: 9 MG/DL (ref 8.6–10.2)
CHLORIDE SERPL-SCNC: 106 MMOL/L (ref 98–107)
CO2 SERPL-SCNC: 20 MMOL/L (ref 22–29)
CREAT SERPL-MCNC: 0.7 MG/DL (ref 0.5–1)
GFR SERPL CREATININE-BSD FRML MDRD: >60 ML/MIN/1.73M2
GLUCOSE SERPL-MCNC: 135 MG/DL (ref 74–99)
POTASSIUM SERPL-SCNC: 4.7 MMOL/L (ref 3.5–5)
PROT SERPL-MCNC: 7.1 G/DL (ref 6.4–8.3)
SODIUM SERPL-SCNC: 142 MMOL/L (ref 132–146)

## 2024-02-15 ENCOUNTER — HOSPITAL ENCOUNTER (OUTPATIENT)
Age: 64
Discharge: HOME | End: 2024-02-17

## 2024-02-15 PROCEDURE — 82365 CALCULUS SPECTROSCOPY: CPT

## 2024-02-19 LAB — SURGICAL PATHOLOGY REPORT: NORMAL

## 2024-02-20 LAB
STONE COMPOSITION: NORMAL
STONE DESCRIPTION: NORMAL
STONE MASS: 133 MG

## 2024-03-08 LAB
25(OH)D3 SERPL-MCNC: 34.6 NG/ML (ref 30–100)
ALBUMIN SERPL-MCNC: 4 G/DL (ref 3.5–5.2)
ALP SERPL-CCNC: 116 U/L (ref 35–104)
ANION GAP SERPL CALCULATED.3IONS-SCNC: 14 MMOL/L (ref 7–16)
AST SERPL-CCNC: 18 U/L (ref 0–31)
BILIRUB SERPL-MCNC: 0.2 MG/DL (ref 0–1.2)
BUN SERPL-MCNC: 16 MG/DL (ref 6–23)
CALCIUM SERPL-MCNC: 9.6 MG/DL (ref 8.6–10.2)
CHLORIDE SERPL-SCNC: 106 MMOL/L (ref 98–107)
CO2 SERPL-SCNC: 23 MMOL/L (ref 22–29)
CREAT SERPL-MCNC: 0.7 MG/DL (ref 0.5–1)
ERYTHROCYTE [DISTWIDTH] IN BLOOD BY AUTOMATED COUNT: 15.9 % (ref 11.5–15)
GFR SERPL CREATININE-BSD FRML MDRD: >60 ML/MIN/1.73M2
GLUCOSE SERPL-MCNC: 93 MG/DL (ref 74–99)
HBA1C MFR BLD: 6.2 % (ref 4–5.6)
HCT VFR BLD AUTO: 39.2 % (ref 34–48)
HDLC SERPL-MCNC: 36 MG/DL
HGB BLD-MCNC: 11.7 G/DL (ref 11.5–15.5)
MCH RBC QN AUTO: 24.4 PG (ref 26–35)
MCHC RBC AUTO-ENTMCNC: 29.8 G/DL (ref 32–34.5)
MCV RBC AUTO: 81.8 FL (ref 80–99.9)
PLATELET # BLD AUTO: 263 K/UL (ref 130–450)
POTASSIUM SERPL-SCNC: 5 MMOL/L (ref 3.5–5)
PROT SERPL-MCNC: 6.9 G/DL (ref 6.4–8.3)
RBC # BLD AUTO: 4.79 M/UL (ref 3.5–5.5)
SODIUM SERPL-SCNC: 143 MMOL/L (ref 132–146)
TRIGL SERPL-MCNC: 113 MG/DL
TSH SERPL DL<=0.05 MIU/L-ACNC: 2.27 UIU/ML (ref 0.27–4.2)
WBC OTHER # BLD: 5.1 K/UL (ref 4.5–11.5)

## 2024-07-08 LAB
25(OH)D3 SERPL-MCNC: 54.4 NG/ML (ref 30–100)
ALBUMIN SERPL-MCNC: 4.4 G/DL (ref 3.5–5.2)
ALP SERPL-CCNC: 125 U/L (ref 35–104)
ALT SERPL-CCNC: 23 U/L (ref 0–32)
ANION GAP SERPL CALCULATED.3IONS-SCNC: 13 MMOL/L (ref 7–16)
AST SERPL-CCNC: 29 U/L (ref 0–31)
BILIRUB SERPL-MCNC: 0.2 MG/DL (ref 0–1.2)
BUN SERPL-MCNC: 16 MG/DL (ref 6–23)
CALCIUM SERPL-MCNC: 9.4 MG/DL (ref 8.6–10.2)
CHLORIDE SERPL-SCNC: 107 MMOL/L (ref 98–107)
CHOLEST SERPL-MCNC: 91 MG/DL
CO2 SERPL-SCNC: 21 MMOL/L (ref 22–29)
CREAT SERPL-MCNC: 0.8 MG/DL (ref 0.5–1)
ERYTHROCYTE [DISTWIDTH] IN BLOOD BY AUTOMATED COUNT: 14.3 % (ref 11.5–15)
GFR, ESTIMATED: 81 ML/MIN/1.73M2
GLUCOSE SERPL-MCNC: 96 MG/DL (ref 74–99)
HBA1C MFR BLD: 5.4 % (ref 4–5.6)
HCT VFR BLD AUTO: 40.2 % (ref 34–48)
HDLC SERPL-MCNC: 34 MG/DL
HGB BLD-MCNC: 12 G/DL (ref 11.5–15.5)
LDLC SERPL CALC-MCNC: 40 MG/DL
MCH RBC QN AUTO: 24 PG (ref 26–35)
MCHC RBC AUTO-ENTMCNC: 29.9 G/DL (ref 32–34.5)
MCV RBC AUTO: 80.2 FL (ref 80–99.9)
PLATELET # BLD AUTO: 278 K/UL (ref 130–450)
PMV BLD AUTO: 10.8 FL (ref 7–12)
POTASSIUM SERPL-SCNC: 4.9 MMOL/L (ref 3.5–5)
PROT SERPL-MCNC: 7.4 G/DL (ref 6.4–8.3)
RBC # BLD AUTO: 5.01 M/UL (ref 3.5–5.5)
SODIUM SERPL-SCNC: 141 MMOL/L (ref 132–146)
TRIGL SERPL-MCNC: 86 MG/DL
VLDLC SERPL CALC-MCNC: 17 MG/DL
WBC OTHER # BLD: 6.1 K/UL (ref 4.5–11.5)

## 2024-09-19 PROCEDURE — 88305 TISSUE EXAM BY PATHOLOGIST: CPT | Performed by: DERMATOLOGY

## 2024-09-20 ENCOUNTER — LAB REQUISITION (OUTPATIENT)
Dept: DERMATOPATHOLOGY | Facility: CLINIC | Age: 64
End: 2024-09-20
Payer: MEDICARE

## 2024-09-20 DIAGNOSIS — L98.9 DISORDER OF THE SKIN AND SUBCUTANEOUS TISSUE, UNSPECIFIED: ICD-10-CM

## 2024-09-23 LAB
LABORATORY COMMENT REPORT: NORMAL
PATH REPORT.FINAL DX SPEC: NORMAL
PATH REPORT.GROSS SPEC: NORMAL
PATH REPORT.MICROSCOPIC SPEC OTHER STN: NORMAL
PATH REPORT.RELEVANT HX SPEC: NORMAL
PATH REPORT.TOTAL CANCER: NORMAL

## 2024-10-07 LAB
25(OH)D3 SERPL-MCNC: 53.1 NG/ML (ref 30–100)
ALBUMIN SERPL-MCNC: 4.2 G/DL (ref 3.5–5.2)
ALP SERPL-CCNC: 121 U/L (ref 35–104)
ALT SERPL-CCNC: 24 U/L (ref 0–32)
ANION GAP SERPL CALCULATED.3IONS-SCNC: 16 MMOL/L (ref 7–16)
AST SERPL-CCNC: 22 U/L (ref 0–31)
BILIRUB SERPL-MCNC: 0.2 MG/DL (ref 0–1.2)
BUN SERPL-MCNC: 16 MG/DL (ref 6–23)
CALCIUM SERPL-MCNC: 9.9 MG/DL (ref 8.6–10.2)
CHLORIDE SERPL-SCNC: 108 MMOL/L (ref 98–107)
CHOLEST SERPL-MCNC: 99 MG/DL
CO2 SERPL-SCNC: 21 MMOL/L (ref 22–29)
CREAT SERPL-MCNC: 0.8 MG/DL (ref 0.5–1)
ERYTHROCYTE [DISTWIDTH] IN BLOOD BY AUTOMATED COUNT: 14.4 % (ref 11.5–15)
GFR, ESTIMATED: 79 ML/MIN/1.73M2
GLUCOSE SERPL-MCNC: 106 MG/DL (ref 74–99)
HBA1C MFR BLD: 5.6 % (ref 4–5.6)
HCT VFR BLD AUTO: 39.2 % (ref 34–48)
HDLC SERPL-MCNC: 30 MG/DL
HGB BLD-MCNC: 11.5 G/DL (ref 11.5–15.5)
LDLC SERPL CALC-MCNC: 40 MG/DL
MCH RBC QN AUTO: 24 PG (ref 26–35)
MCHC RBC AUTO-ENTMCNC: 29.3 G/DL (ref 32–34.5)
MCV RBC AUTO: 81.8 FL (ref 80–99.9)
PLATELET # BLD AUTO: 259 K/UL (ref 130–450)
PMV BLD AUTO: 10.9 FL (ref 7–12)
POTASSIUM SERPL-SCNC: 4.7 MMOL/L (ref 3.5–5)
PROT SERPL-MCNC: 7.3 G/DL (ref 6.4–8.3)
RBC # BLD AUTO: 4.79 M/UL (ref 3.5–5.5)
SODIUM SERPL-SCNC: 145 MMOL/L (ref 132–146)
TRIGL SERPL-MCNC: 146 MG/DL
TSH SERPL DL<=0.05 MIU/L-ACNC: 1.84 UIU/ML (ref 0.27–4.2)
VLDLC SERPL CALC-MCNC: 29 MG/DL
WBC OTHER # BLD: 6.3 K/UL (ref 4.5–11.5)

## 2025-01-06 LAB
25(OH)D3 SERPL-MCNC: 41.1 NG/ML (ref 30–100)
ALBUMIN SERPL-MCNC: 4.2 G/DL (ref 3.5–5.2)
ALP SERPL-CCNC: 139 U/L (ref 35–104)
ALT SERPL-CCNC: 23 U/L (ref 0–32)
ANION GAP SERPL CALCULATED.3IONS-SCNC: 10 MMOL/L (ref 7–16)
AST SERPL-CCNC: 26 U/L (ref 0–31)
BILIRUB SERPL-MCNC: 0.3 MG/DL (ref 0–1.2)
BUN SERPL-MCNC: 18 MG/DL (ref 6–23)
CALCIUM SERPL-MCNC: 9.4 MG/DL (ref 8.6–10.2)
CHLORIDE SERPL-SCNC: 104 MMOL/L (ref 98–107)
CHOLEST SERPL-MCNC: 95 MG/DL
CO2 SERPL-SCNC: 26 MMOL/L (ref 22–29)
CREAT SERPL-MCNC: 0.9 MG/DL (ref 0.5–1)
ERYTHROCYTE [DISTWIDTH] IN BLOOD BY AUTOMATED COUNT: 15.6 % (ref 11.5–15)
GFR, ESTIMATED: 73 ML/MIN/1.73M2
GLUCOSE SERPL-MCNC: 136 MG/DL (ref 74–99)
HBA1C MFR BLD: 5.8 % (ref 4–5.6)
HCT VFR BLD AUTO: 38.6 % (ref 34–48)
HDLC SERPL-MCNC: 28 MG/DL
HGB BLD-MCNC: 11.5 G/DL (ref 11.5–15.5)
LDLC SERPL CALC-MCNC: 46 MG/DL
MCH RBC QN AUTO: 24.7 PG (ref 26–35)
MCHC RBC AUTO-ENTMCNC: 29.8 G/DL (ref 32–34.5)
MCV RBC AUTO: 83 FL (ref 80–99.9)
PLATELET # BLD AUTO: 272 K/UL (ref 130–450)
PMV BLD AUTO: 10.9 FL (ref 7–12)
POTASSIUM SERPL-SCNC: 4.8 MMOL/L (ref 3.5–5)
PROT SERPL-MCNC: 6.9 G/DL (ref 6.4–8.3)
RBC # BLD AUTO: 4.65 M/UL (ref 3.5–5.5)
SODIUM SERPL-SCNC: 140 MMOL/L (ref 132–146)
TRIGL SERPL-MCNC: 104 MG/DL
TSH SERPL DL<=0.05 MIU/L-ACNC: 2.97 UIU/ML (ref 0.27–4.2)
VLDLC SERPL CALC-MCNC: 21 MG/DL
WBC OTHER # BLD: 6 K/UL (ref 4.5–11.5)

## (undated) DEVICE — CLOTH SURG PREP PREOPERATIVE CHLORHEXIDINE GLUC 2% READYPREP

## (undated) DEVICE — 12 ML SYRINGE,LUER-LOCK TIP: Brand: MONOJECT

## (undated) DEVICE — NDL CNTR 40CT FM MAG: Brand: MEDLINE INDUSTRIES, INC.

## (undated) DEVICE — HYPODERMIC SAFETY NEEDLE: Brand: MAGELLAN

## (undated) DEVICE — Z DISCONTINUED APPLICATOR SURG PREP 0.35OZ 2% CHG 70% ISO ALC W/ HI LT

## (undated) DEVICE — BANDAGE COMPR W6INXL5YD SELF ADH COHESIVE CO FLX

## (undated) DEVICE — 3M™ STERI-DRAPE™ U-DRAPE 1015: Brand: STERI-DRAPE™

## (undated) DEVICE — SYRINGE MED 10ML LUERLOCK TIP W/O SFTY DISP

## (undated) DEVICE — PADDING,UNDERCAST,COTTON, 4"X4YD STERILE: Brand: MEDLINE

## (undated) DEVICE — COVER,LIGHT HANDLE,FLX,1/PK: Brand: MEDLINE INDUSTRIES, INC.

## (undated) DEVICE — GLOVE ORTHO 8   MSG9480

## (undated) DEVICE — STERILE POLYISOPRENE POWDER-FREE SURGICAL GLOVES: Brand: PROTEXIS

## (undated) DEVICE — BANDAGE ADH W1XL3IN NAT FAB WVN FLX DURABLE N ADH PD SEAL

## (undated) DEVICE — GOWN,SIRUS,POLYRNF,RAGLAN,XL,ST,30/CS: Brand: MEDLINE

## (undated) DEVICE — 3.75 MM INTEGRATED CABLE WAND ICW,                                    SUPER MULTIVAC 50 WITH INTEGRATED                                    FINGER SWITCHES IFS, 50 DEGREE: Brand: COBLATION

## (undated) DEVICE — PAD,ABDOMINAL,8"X10",ST,LF: Brand: MEDLINE

## (undated) DEVICE — NEEDLE HYPO 25GA L1.5IN BLU POLYPR HUB S STL REG BVL STR

## (undated) DEVICE — TUBING PMP L16FT MAIN DISP FOR AR-6400 AR-6475

## (undated) DEVICE — BANDAGE COMPR L W4INXL11YD 100% COT WVN E DBL LEN CLP CLSR

## (undated) DEVICE — APPLICATOR MEDICATED 26 CC SOLUTION HI LT ORNG CHLORAPREP

## (undated) DEVICE — INTENDED FOR TISSUE SEPARATION, AND OTHER PROCEDURES THAT REQUIRE A SHARP SURGICAL BLADE TO PUNCTURE OR CUT.: Brand: BARD-PARKER ® STAINLESS STEEL BLADES

## (undated) DEVICE — SOLUTION IRRIG 3000ML 0.9% SOD CHL USP UROMATIC PLAS CONT

## (undated) DEVICE — GAUZE,SPONGE,4"X4",16PLY,XRAY,STRL,LF: Brand: MEDLINE

## (undated) DEVICE — TUBING, SUCTION, 1/4" X 10', STRAIGHT: Brand: MEDLINE

## (undated) DEVICE — GOWN,SIRUS,POLYRNF,BRTHSLV,XLN/XXL,18/CS: Brand: MEDLINE

## (undated) DEVICE — BANDAGE COMPR W6INXL12FT SMOOTH FOR LIMB EXSANG ESMARCH

## (undated) DEVICE — TOWEL,OR,DSP,ST,BLUE,STD,6/PK,12PK/CS: Brand: MEDLINE

## (undated) DEVICE — PAD POS ARTHSCP DISP PIVOTPOST

## (undated) DEVICE — MARKER,SKIN,WI/RULER AND LABELS: Brand: MEDLINE

## (undated) DEVICE — SHEET,DRAPE,40X58,STERILE: Brand: MEDLINE

## (undated) DEVICE — DRESSING HYDROFIBER AQUACEL AG ADVANTAGE 3.5X12 IN

## (undated) DEVICE — DOUBLE BASIN SET: Brand: MEDLINE INDUSTRIES, INC.

## (undated) DEVICE — AMIENT MEGAVAC 90 WAND: Brand: COBLATION

## (undated) DEVICE — PACK,EXTREMITY,ORTHOMAX,5/CS: Brand: MEDLINE

## (undated) DEVICE — NEEDLE SPNL L3.5IN PNK HUB S STL REG WALL FIT STYL W/ QNCKE

## (undated) DEVICE — BLADE SHVR AGRSV + RED BL 4.0MM

## (undated) DEVICE — GLOVE ORANGE PI 8   MSG9080

## (undated) DEVICE — GAUZE,SPONGE,4"X4",16PLY,STRL,LF,10/TRAY: Brand: MEDLINE